# Patient Record
Sex: MALE | Race: WHITE | Employment: FULL TIME | URBAN - METROPOLITAN AREA
[De-identification: names, ages, dates, MRNs, and addresses within clinical notes are randomized per-mention and may not be internally consistent; named-entity substitution may affect disease eponyms.]

---

## 2017-03-01 ENCOUNTER — TELEPHONE (OUTPATIENT)
Dept: FAMILY MEDICINE | Facility: OTHER | Age: 62
End: 2017-03-01

## 2017-03-01 NOTE — TELEPHONE ENCOUNTER
Summary:    Patient is due/failing the following:   BP CHECK, PHQ9 and PHYSICAL    Action needed:   Patient needs office visit for PE and hypertension follow up. and Patient needs to do PHQ9.    Type of outreach:    Phone, left message for patient to call back.     Questions for provider review:    None                                                                                                                                    Sylvie Waddell       Chart routed to Care Team .        Panel Management Review      Patient has the following on his problem list:     Depression / Dysthymia review  PHQ-9 SCORE 9/25/2015 11/6/2015 4/29/2016   Total Score - - -   Total Score 13 13 17      Patient is due for:  PHQ9 and DAP    Hypertension   Last three blood pressure readings:  BP Readings from Last 3 Encounters:   07/28/16 126/77   04/29/16 (!) 148/96   12/07/15 (!) 160/100     Blood pressure: FAILED    HTN Guidelines:  Age 18-59 BP range:  Less than 140/90  Age 60-85 with Diabetes:  Less than 140/90  Age 60-85 without Diabetes:  less than 150/90      Composite cancer screening  Chart review shows that this patient is due/due soon for the following None

## 2017-03-01 NOTE — LETTER
[facility]  [id number] [place] [id number]  Phone: [phone]  March 8, 2017      Bryce Diaz  [address] [place] [id number]      Dear Bryce,    We care about your health and have reviewed your health plan including your medical conditions, medications, and lab results.  Based on this review, it is recommended that you follow up regarding the following health topic(s):  -Depression  -High Blood Pressure  -Wellness (Physical) Visit     We recommend you take the following action(s):  -schedule a FOLLOWUP APPOINTMENT.  -Complete and return the attached PHQ-9 Form.  If your total score is greater than 9, please schedule a followup appointment.  If you answer Yes to question 9, call your clinic between the hours of 8 to 5.  You may also call the Suicide Hotline at 6-312-949-FRQG (0525) any time.     Please call us at the [facility] - [phone] (or use [employer]) to address the above recommendations.     Thank you for trusting [facility] and we appreciate the opportunity to serve you.  We look forward to supporting your healthcare needs in the future.    Healthy Regards,    Your Health Care Team  [facility]

## 2017-07-19 ENCOUNTER — APPOINTMENT (OUTPATIENT)
Dept: GENERAL RADIOLOGY | Facility: CLINIC | Age: 62
End: 2017-07-19
Attending: FAMILY MEDICINE

## 2017-07-19 ENCOUNTER — HOSPITAL ENCOUNTER (EMERGENCY)
Facility: CLINIC | Age: 62
Discharge: HOME | End: 2017-07-19
Attending: FAMILY MEDICINE | Admitting: FAMILY MEDICINE

## 2017-07-19 VITALS
HEIGHT: 67 IN | SYSTOLIC BLOOD PRESSURE: 127 MMHG | TEMPERATURE: 98.8 F | RESPIRATION RATE: 20 BRPM | BODY MASS INDEX: 16.95 KG/M2 | HEART RATE: 82 BPM | WEIGHT: 108 LBS | OXYGEN SATURATION: 96 % | DIASTOLIC BLOOD PRESSURE: 67 MMHG

## 2017-07-19 DIAGNOSIS — I10 BENIGN ESSENTIAL HYPERTENSION: ICD-10-CM

## 2017-07-19 DIAGNOSIS — J18.9 COMMUNITY ACQUIRED PNEUMONIA: ICD-10-CM

## 2017-07-19 DIAGNOSIS — R07.1 PAINFUL RESPIRATION: ICD-10-CM

## 2017-07-19 DIAGNOSIS — R07.89 ATYPICAL CHEST PAIN: ICD-10-CM

## 2017-07-19 LAB
ALBUMIN SERPL-MCNC: 3.6 G/DL (ref 3.4–5)
ALP SERPL-CCNC: 83 U/L (ref 40–150)
ALT SERPL W P-5'-P-CCNC: 23 U/L (ref 0–70)
ANION GAP SERPL CALCULATED.3IONS-SCNC: 7 MMOL/L (ref 3–14)
AST SERPL W P-5'-P-CCNC: 17 U/L (ref 0–45)
BASOPHILS # BLD AUTO: 0 10E9/L (ref 0–0.2)
BASOPHILS NFR BLD AUTO: 0.2 %
BILIRUB DIRECT SERPL-MCNC: <0.1 MG/DL (ref 0–0.2)
BILIRUB SERPL-MCNC: 0.4 MG/DL (ref 0.2–1.3)
BUN SERPL-MCNC: 23 MG/DL (ref 7–30)
CALCIUM SERPL-MCNC: 8.3 MG/DL (ref 8.5–10.1)
CHLORIDE SERPL-SCNC: 108 MMOL/L (ref 94–109)
CO2 SERPL-SCNC: 26 MMOL/L (ref 20–32)
CREAT SERPL-MCNC: 1 MG/DL (ref 0.66–1.25)
CRP SERPL-MCNC: 6.4 MG/L (ref 0–8)
D DIMER PPP FEU-MCNC: 0.3 UG/ML FEU (ref 0–0.5)
DIFFERENTIAL METHOD BLD: ABNORMAL
EOSINOPHIL # BLD AUTO: 0.1 10E9/L (ref 0–0.7)
EOSINOPHIL NFR BLD AUTO: 1.6 %
ERYTHROCYTE [DISTWIDTH] IN BLOOD BY AUTOMATED COUNT: 14.2 % (ref 10–15)
GFR SERPL CREATININE-BSD FRML MDRD: 76 ML/MIN/1.7M2
GLUCOSE SERPL-MCNC: 90 MG/DL (ref 70–99)
HCT VFR BLD AUTO: 40 % (ref 40–53)
HGB BLD-MCNC: 13.1 G/DL (ref 13.3–17.7)
IMM GRANULOCYTES # BLD: 0 10E9/L (ref 0–0.4)
IMM GRANULOCYTES NFR BLD: 0.5 %
LYMPHOCYTES # BLD AUTO: 1.2 10E9/L (ref 0.8–5.3)
LYMPHOCYTES NFR BLD AUTO: 13.2 %
MCH RBC QN AUTO: 30.1 PG (ref 26.5–33)
MCHC RBC AUTO-ENTMCNC: 32.8 G/DL (ref 31.5–36.5)
MCV RBC AUTO: 92 FL (ref 78–100)
MONOCYTES # BLD AUTO: 0.8 10E9/L (ref 0–1.3)
MONOCYTES NFR BLD AUTO: 8.7 %
NEUTROPHILS # BLD AUTO: 6.7 10E9/L (ref 1.6–8.3)
NEUTROPHILS NFR BLD AUTO: 75.8 %
PLATELET # BLD AUTO: 222 10E9/L (ref 150–450)
POTASSIUM SERPL-SCNC: 4 MMOL/L (ref 3.4–5.3)
PROT SERPL-MCNC: 7.4 G/DL (ref 6.8–8.8)
RBC # BLD AUTO: 4.35 10E12/L (ref 4.4–5.9)
SODIUM SERPL-SCNC: 141 MMOL/L (ref 133–144)
TROPONIN I SERPL-MCNC: NORMAL UG/L (ref 0–0.04)
WBC # BLD AUTO: 8.8 10E9/L (ref 4–11)

## 2017-07-19 PROCEDURE — 99285 EMERGENCY DEPT VISIT HI MDM: CPT | Mod: 25 | Performed by: FAMILY MEDICINE

## 2017-07-19 PROCEDURE — 93010 ELECTROCARDIOGRAM REPORT: CPT | Mod: Z6 | Performed by: FAMILY MEDICINE

## 2017-07-19 PROCEDURE — 84484 ASSAY OF TROPONIN QUANT: CPT | Performed by: FAMILY MEDICINE

## 2017-07-19 PROCEDURE — 25000128 H RX IP 250 OP 636: Performed by: FAMILY MEDICINE

## 2017-07-19 PROCEDURE — 93005 ELECTROCARDIOGRAM TRACING: CPT | Performed by: FAMILY MEDICINE

## 2017-07-19 PROCEDURE — 85379 FIBRIN DEGRADATION QUANT: CPT | Performed by: FAMILY MEDICINE

## 2017-07-19 PROCEDURE — 80076 HEPATIC FUNCTION PANEL: CPT | Performed by: FAMILY MEDICINE

## 2017-07-19 PROCEDURE — 96365 THER/PROPH/DIAG IV INF INIT: CPT | Performed by: FAMILY MEDICINE

## 2017-07-19 PROCEDURE — 99285 EMERGENCY DEPT VISIT HI MDM: CPT | Mod: Z6 | Performed by: FAMILY MEDICINE

## 2017-07-19 PROCEDURE — 86140 C-REACTIVE PROTEIN: CPT | Performed by: FAMILY MEDICINE

## 2017-07-19 PROCEDURE — 80048 BASIC METABOLIC PNL TOTAL CA: CPT | Performed by: FAMILY MEDICINE

## 2017-07-19 PROCEDURE — 85025 COMPLETE CBC W/AUTO DIFF WBC: CPT | Performed by: FAMILY MEDICINE

## 2017-07-19 PROCEDURE — 25000132 ZZH RX MED GY IP 250 OP 250 PS 637: Performed by: FAMILY MEDICINE

## 2017-07-19 PROCEDURE — 71020 XR CHEST 2 VW: CPT | Mod: TC

## 2017-07-19 RX ORDER — LIDOCAINE 40 MG/G
CREAM TOPICAL
Status: DISCONTINUED | OUTPATIENT
Start: 2017-07-19 | End: 2017-07-19 | Stop reason: HOSPADM

## 2017-07-19 RX ORDER — ACETAMINOPHEN 325 MG/1
975 TABLET ORAL ONCE
Status: COMPLETED | OUTPATIENT
Start: 2017-07-19 | End: 2017-07-19

## 2017-07-19 RX ORDER — CEFTRIAXONE 1 G/1
1 INJECTION, POWDER, FOR SOLUTION INTRAMUSCULAR; INTRAVENOUS ONCE
Status: COMPLETED | OUTPATIENT
Start: 2017-07-19 | End: 2017-07-19

## 2017-07-19 RX ORDER — CEFUROXIME AXETIL 500 MG/1
500 TABLET ORAL 2 TIMES DAILY
Qty: 20 TABLET | Refills: 0 | Status: SHIPPED | OUTPATIENT
Start: 2017-07-19 | End: 2017-08-24

## 2017-07-19 RX ADMIN — CEFTRIAXONE SODIUM 1 G: 1 INJECTION, POWDER, FOR SOLUTION INTRAMUSCULAR; INTRAVENOUS at 20:13

## 2017-07-19 RX ADMIN — ACETAMINOPHEN 975 MG: 325 TABLET ORAL at 19:30

## 2017-07-19 ASSESSMENT — ENCOUNTER SYMPTOMS
WHEEZING: 0
LIGHT-HEADEDNESS: 1
WEAKNESS: 1
COUGH: 0
DIZZINESS: 0
NERVOUS/ANXIOUS: 0
HEADACHES: 1
FEVER: 1
FATIGUE: 1
DIAPHORESIS: 1
EYE REDNESS: 0
DYSURIA: 0
CHILLS: 1
CONFUSION: 0
NAUSEA: 0
DIARRHEA: 0
POLYDIPSIA: 0
PALPITATIONS: 0
SHORTNESS OF BREATH: 0
EYE PAIN: 0
SORE THROAT: 0
VOMITING: 0
BACK PAIN: 1
ABDOMINAL PAIN: 0

## 2017-07-19 NOTE — ED NOTES
Pt put on cont cardiac monitor, oximeter and BP cuff on arrival. Pt told a nitro of his brothers before arrival. Took excedrin this am. Equals 500mg of asa

## 2017-07-19 NOTE — ED AVS SNAPSHOT
[facility]    [address] [place] [address]    Phone:  [phone]    Fax:  [phone]                                       Bryce Diaz   MRN: [id number]    Department:  [facility]   Date of Visit:  7/19/2017           After Visit Summary Signature Page     I have received my discharge instructions, and my questions have been answered. I have discussed any challenges I see with this plan with the nurse or doctor.    ..........................................................................................................................................  Patient/Patient Representative Signature      ..........................................................................................................................................  Patient Representative Print Name and Relationship to Patient    ..................................................               ................................................  Date                                            Time    ..........................................................................................................................................  Reviewed by Signature/Title    ...................................................              ..............................................  Date                                                            Time

## 2017-07-19 NOTE — ED AVS SNAPSHOT
[facility] Emergency Department    [address] [place] [address]    Phone:  [phone]    Fax:  [phone]                                       Bryce Diaz   MRN: [id number]    Department:  [facility] Emergency Department   Date of Visit:  7/19/2017           Patient Information     Date Of Birth          1955        Your diagnoses for this visit were:     Community acquired pneumonia     Painful respiration     Atypical chest pain     Benign essential hypertension        You were seen by Jenna, Omer DAN MD.      Follow-up Information     Follow up with Jesus Powell MD In 1 week.    Specialty:  Family Practice    Contact information:    [facility]  [address] [place] [id number] [phone]          Follow up with [facility] Emergency Department.    Specialty:  EMERGENCY MEDICINE    Why:  If symptoms worsen    Contact information:    [address] [place] [id number] [phone]    Additional information:    From [address]: Exit at [address] on south side of [place]. Turn right on [address]. Turn left at stoplight on [address]. [facility] will be in view two blocks ahead        Discharge Instructions         Pneumonia (Adult)  Pneumonia is an infection deep within the lungs. It is in the small air sacs (alveoli). Pneumonia may be caused by a virus or bacteria. Pneumonia caused by bacteria is usually treated with an antibiotic. Severe cases may need to be treated in the hospital. Milder cases can be treated at home. Symptoms usually start to get better during the first 2 days of treatment.    Home care  Follow these guidelines when caring for yourself at home:    Rest at home for the first 2 to 3 days, or until you feel stronger. Don t let yourself get overly tired when you go back to your activities.    Stay away from cigarette smoke - yours or other people s.    You may use acetaminophen or  ibuprofen to control fever or pain, unless another medicine was prescribed. If you have chronic liver or kidney disease, talk with your healthcare provider before using these medicines. Also talk with your provider if you ve had a stomach ulcer or gastrointestinal bleeding. Don t give aspirin to anyone younger than 18 years of age who is ill with a fever. It may cause severe liver damage.    Your appetite may be poor, so a light diet is fine.    Drink 6 to 8 glasses of fluids every day to make sure you are getting enough fluids. Beverages can include water, sport drinks, sodas without caffeine, juices, tea, or soup. Fluids will help loosen secretions in the lung. This will make it easier for you to cough up the phlegm (sputum). If you also have heart or kidney disease, check with your healthcare provider before you drink extra fluids.    Take antibiotic medicine prescribed until it is all gone, even if you are feeling better after a few days.  Follow-up care  Follow up with your healthcare provider in the next 2 to 3 days, or as advised. This is to be sure the medicine is helping you get better.  If you are 65 or older, you should get a pneumococcal vaccine and a yearly flu (influenza) shot. You should also get these vaccines if you have chronic lung disease like asthma, emphysema, or COPD. Recently, a second type of pneumonia vaccine has become available for everyone over 65 years old. This is in addition to the previous vaccine. Ask your provider about this.  When to seek medical advice  Call your healthcare provider right away if any of these occur:    You don t get better within the first 48 hours of treatment    Shortness of breath gets worse    Rapid breathing (more than 25 breaths per minute)    Coughing up blood    Chest pain gets worse with breathing    Fever of 100.4 F (38 C) or higher that doesn t get better with fever medicine    Weakness, dizziness, or fainting that gets worse    Thirst or dry mouth  that gets worse    Sinus pain, headache, or a stiff neck    Chest pain not caused by coughing  Date Last Reviewed: 1/1/2017 2000-2017 [employer]. [address] [place] [address], [place] [address]. All rights reserved. This information is not intended as a substitute for professional medical care. Always follow your healthcare professional's instructions.          Discharge References/Attachments     PLEURISY (ENGLISH)    PNEUMONIA, WHEN YOU HAVE  (ENGLISH)      24 Hour Appointment Hotline       To make an appointment at any [facility], call [phone] ([phone]). If you don't have a family doctor or clinic, we will help you find one. [place] clinics are conveniently located to serve the needs of you and your family.             Review of your medicines      START taking        Dose / Directions Last dose taken    cefuroxime 500 MG tablet   Commonly known as:  CEFTIN   Dose:  500 mg   Quantity:  20 tablet        Take 1 tablet (500 mg) by mouth 2 times daily   Refills:  0          Our records show that you are taking the medicines listed below. If these are incorrect, please call your family doctor or clinic.        Dose / Directions Last dose taken    amphetamine-dextroamphetamine 10 MG per tablet   Commonly known as:  ADDERALL   Dose:  10-20 mg   Quantity:  180 tablet        Take 1-2 tablets (10-20 mg) by mouth 3 times daily up to 6 tablets per day   Refills:  0        carvedilol 12.5 MG tablet   Commonly known as:  COREG   Dose:  12.5 mg   Quantity:  60 tablet        Take 1 tablet (12.5 mg) by mouth 2 times daily (with meals)   Refills:  2        gabapentin 300 MG capsule   Commonly known as:  NEURONTIN   Dose:  300 mg   Quantity:  270 capsule        Take 1 capsule (300 mg) by mouth 3 times daily   Refills:  1        oxyCODONE 5 MG IR tablet   Commonly known as:  ROXICODONE   Dose:  5 mg   Quantity:  20 tablet        Take 1 tablet (5 mg) by mouth every 4 hours as needed for pain (Reserve  for bedtime use)   Refills:  0        sertraline 50 MG tablet   Commonly known as:  ZOLOFT   Dose:  50 mg   Quantity:  90 tablet        Take 1 tablet (50 mg) by mouth daily   Refills:  1        traZODone 150 MG tablet   Commonly known as:  DESYREL   Dose:  150 mg   Quantity:  90 tablet        Take 1 tablet (150 mg) by mouth At Bedtime   Refills:  1        TYLENOL PO   Dose:  650 mg        Take 650 mg by mouth every 4 hours as needed for mild pain or fever   Refills:  0                Prescriptions were sent or printed at these locations (1 Prescription)                   [facility] [place], [place] - [address]    [address] , [facility] [address]    Telephone:  [phone]   Fax:  [phone]   Hours:                  Printed at Department/Unit printer (1 of 1)         cefuroxime (CEFTIN) 500 MG tablet                Procedures and tests performed during your visit     Basic metabolic panel    CBC with platelets differential    CRP inflammation    D dimer quantitative    EKG 12-lead, tracing only    Hepatic panel    Peripheral IV: Standard    Troponin I    XR Chest 2 Views      Orders Needing Specimen Collection     None      Pending Results     Date and Time Order Name Status Description    7/19/2017 1839 XR Chest 2 Views Preliminary             Pending Culture Results     No orders found from 7/17/2017 to 7/20/2017.            Pending Results Instructions     If you had any lab results that were not finalized at the time of your Discharge, you can call the ED Lab Result RN at [phone]. You will be contacted by this team for any positive Lab results or changes in treatment. The nurses are available 7 days a week from 10A to 6:30P.  You can leave a message 24 hours per day and they will return your call.        Thank you for choosing [facility]       Thank you for choosing [facility] for your care. Our goal is always to provide you with excellent care. Hearing back from our patients is one  way we can continue to improve our services. Please take a few minutes to complete the written survey that you may receive in the mail after you visit with us. Thank you!        [employer] Information     [employer] gives you secure access to your electronic health record. If you see a primary care provider, you can also send messages to your care team and make appointments. If you have questions, please call your primary care clinic.  If you do not have a primary care provider, please call [phone] and they will assist you.        Care EveryWhere ID     This is your Care EveryWhere ID. This could be used by other organizations to access your [place] medical records  [id number]        Equal Access to Services     GLDAYS ESQUVIEL : Qian Rai, elkin soares, sherif pereyra, alexandre vance . So [facility] [phone].    ATENCIÓN: Si habla español, tiene a arevalo disposición servicios gratuitos de asistencia lingüística. Llame al [phone].    We comply with applicable federal civil rights laws and [place] laws. We do not discriminate on the basis of race, color, national origin, age, disability sex, sexual orientation or gender identity.            After Visit Summary       This is your record. Keep this with you and show to your community pharmacist(s) and doctor(s) at your next visit.

## 2017-07-19 NOTE — ED NOTES
"He started having chest tightness and his head felt \"on fire\" since 1430.  When he woke up the stabbing pain in his shoulder pain was worse and his BP was 200/127 and his brother gave him a nitro and it seemed to help his BP but not feeling himself yet.  "

## 2017-07-20 NOTE — DISCHARGE INSTRUCTIONS

## 2017-07-20 NOTE — ED NOTES
Brought patient some crackers- patient wanted to know if he could have a fish sandwich and a shake. Per  That would be ok- he doesn't have any dietary restrictions.

## 2017-07-20 NOTE — ED PROVIDER NOTES
History     Chief Complaint   Patient presents with     Chest Pain     HPI  Bryce Diaz is a 61 year old male who presents to the emergency department with chest pain back pain and feeling ill. He came home from an uneventful day at work earlier today. He took a nap and on awakening he was very red in the face flush warm and febrile. He notices that when he takes a deep breath he has a pain underneath his right shoulder blade. He also began noticing that he was having some pains all across his chest. The symptoms he presented to make sure he wasn't having a heart attack. Risk factors include hypertension. He has no history of pleurisy. He does have a history of pneumonia and recalls not having any severe symptoms with it. He has had no cold symptoms of sore throat cough or congestion. He feels very chilled here in the emergency department and was very hot red and sweaty at home.    Patient Active Problem List   Diagnosis     Esophageal reflux     Attention deficit hyperactivity disorder (ADHD)     Myalgia and myositis     CARDIOVASCULAR SCREENING; LDL GOAL LESS THAN 130     Hypertension goal BP (blood pressure) < 140/90     Intervertebral lumbar disc disorder with myelopathy, lumbar region     Low back pain radiating to both legs     Advanced directives, counseling/discussion     Sciatic nerve pain     Mild major depression (H)     BPH (benign prostatic hyperplasia)     Lumbar stenosis     Past Medical History:   Diagnosis Date     Backache, unspecified     Hx of back pain     Depressive disorder, not elsewhere classified 1979    committed for suicidal ideation and depression     Depressive disorder, not elsewhere classified 12/18/01    E/P Attention Deficit Disorder - See Sum. From [facility]     Hernia of unspecified site of abdominal cavity without mention of obstruction or gangrene 08/07/1998    Umbilical hernia repair with mesh plug     Current Facility-Administered Medications   Medication      lidocaine 1 % 1 mL     lidocaine (LMX4) kit     sodium chloride (PF) 0.9% PF flush 3 mL     sodium chloride (PF) 0.9% PF flush 3 mL     cefTRIAXone (ROCEPHIN) 1 g vial to attach to  mL bag for ADULTS or NS 50 mL bag for PEDS     Current Outpatient Prescriptions   Medication     cefuroxime (CEFTIN) 500 MG tablet     Acetaminophen (TYLENOL PO)     oxyCODONE (ROXICODONE) 5 MG immediate release tablet     traZODone (DESYREL) 150 MG tablet     sertraline (ZOLOFT) 50 MG tablet     carvedilol (COREG) 12.5 MG tablet     amphetamine-dextroamphetamine (ADDERALL) 10 MG tablet     gabapentin (NEURONTIN) 300 MG capsule     No Known Allergies  Past Surgical History:   Procedure Laterality Date     C APPENDECTOMY      5 years of age     C NONSPECIFIC PROCEDURE  1983    neck fusion     COLONOSCOPY N/A 9/23/2015    Procedure: COLONOSCOPY;  Surgeon: Juan Francisco Cornejo MD;  Location: [facility]     HC RECONSTRUCT TENDON GOLDEN EA, W LOCAL TISSUES  08/07/1998    A-1 pulley release, right fourth finger     HC UGI ENDOSCOPY DIAG W OR W/O BRUSH/WASH  2002    bx. pending  - use proton pump inhibitor indefinitely     HEMILAMINECTOMY, DISCECTOMY LUMBAR TWO LEVELS, COMBINED Right 10/21/2015    Procedure: COMBINED HEMILAMINECTOMY, DISCECTOMY LUMBAR TWO LEVELS;  Surgeon: Jesus Ornelas MD;  Location: [facility]     INJECT EPIDURAL LUMBAR  11/14/13,2/10/14    [facility]     INJECT EPIDURAL LUMBAR  5/13/14,8/5/14    [facility]     INJECT EPIDURAL LUMBAR  12/15/14    [facility]     Social History     Social History     Marital status:      Spouse name: Keyla     Number of children: 4     Years of education: 14     Occupational History     Coun[employer]     Social History Main Topics     Smoking status: Never Smoker     Smokeless tobacco: Never Used      Comment: no smoking in the home     Alcohol use Yes      Comment: rare     Drug use: No     Sexual activity: No  "     Comment: Spouse \"tubes tied\"     Other Topics Concern      Service No     Blood Transfusions Yes     Blood transfusion with snake bite in approx 1978     Caffeine Concern Yes     pop: 1 gal /day     Occupational Exposure No     Hobby Hazards No     Sleep Concern Yes     Hx sleep study     Stress Concern Yes     Weight Concern Yes     desire wt loss     Special Diet No     Back Care Yes     Hx back and neck surgery     Exercise Yes     Basketball, hunting and biking   3/wk     Bike Helmet Yes     Seat Belt Yes     Parent/Sibling W/ Cabg, Mi Or Angioplasty Before 65f 55m? No     Social History Narrative    Liives with spouse and 3 children.         I have reviewed the Medications, Allergies, Past Medical and Surgical History, and Social History in the Epic system.               Review of Systems   Constitutional: Positive for chills, diaphoresis, fatigue and fever.   HENT: Negative for congestion and sore throat.    Eyes: Negative for pain and redness.   Respiratory: Negative for cough, shortness of breath and wheezing.    Cardiovascular: Positive for chest pain. Negative for palpitations.   Gastrointestinal: Negative for abdominal pain, diarrhea, nausea and vomiting.   Endocrine: Negative for polydipsia and polyuria.   Genitourinary: Negative for dysuria.   Musculoskeletal: Positive for back pain.        Mostly underneath his right shoulder blade in the back.   Skin: Negative for pallor and rash.   Allergic/Immunologic: Negative for immunocompromised state.   Neurological: Positive for weakness, light-headedness and headaches. Negative for dizziness.        The patient usually has daily headaches if he does not take any caffeine. They were usually respond to caffeine-Excedrin Migraine.   Psychiatric/Behavioral: Negative for confusion. The patient is not nervous/anxious.    All other systems reviewed and are negative.      Physical Exam   BP: (!) 163/105  Pulse: 82  Heart Rate: 82  Temp: 100  F (37.8 " " C)  Resp: 18  Height: 170.2 cm (5' 7\")  Weight: 49 kg (108 lb)  SpO2: 99 %  Physical Exam   Constitutional: He is oriented to person, place, and time.   Alert pleasant smiling joking 61-year-old male. He has a low-grade fever and is hypertensive. He has quit taking his blood pressure medications.BP (!) 135/99  Pulse 82  Temp 100  F (37.8  C) (Oral)  Resp (!) 32  Ht 1.702 m (5' 7\")  Wt 49 kg (108 lb)  SpO2 96%  BMI 16.92 kg/m2     HENT:   Head: Normocephalic.   Right Ear: External ear normal.   Left Ear: External ear normal.   Nose: Nose normal.   Mouth/Throat: Oropharynx is clear and moist.   Eyes: Conjunctivae are normal.   Neck: Normal range of motion. Neck supple.   Cardiovascular: Normal rate, regular rhythm and normal heart sounds.    Pulmonary/Chest: Effort normal and breath sounds normal.   Abdominal: Soft. There is no tenderness. There is no rebound and no guarding.   Musculoskeletal:        Thoracic back: He exhibits pain. He exhibits no tenderness.        Back:    Neurological: He is alert and oriented to person, place, and time.   Skin: Skin is warm and dry.   Psychiatric: He has a normal mood and affect. His behavior is normal.   Nursing note and vitals reviewed.      ED Course     ED Course     Results for orders placed or performed during the hospital encounter of 07/19/17 (from the past 48 hour(s))   CBC with platelets differential   Result Value Ref Range    WBC 8.8 4.0 - 11.0 10e9/L    RBC Count 4.35 (L) 4.4 - 5.9 10e12/L    Hemoglobin 13.1 (L) 13.3 - 17.7 g/dL    Hematocrit 40.0 40.0 - 53.0 %    MCV 92 78 - 100 fl    MCH 30.1 26.5 - 33.0 pg    MCHC 32.8 31.5 - 36.5 g/dL    RDW 14.2 10.0 - 15.0 %    Platelet Count 222 150 - 450 10e9/L    Diff Method Automated Method     % Neutrophils 75.8 %    % Lymphocytes 13.2 %    % Monocytes 8.7 %    % Eosinophils 1.6 %    % Basophils 0.2 %    % Immature Granulocytes 0.5 %    Absolute Neutrophil 6.7 1.6 - 8.3 10e9/L    Absolute Lymphocytes 1.2 0.8 - 5.3 " 10e9/L    Absolute Monocytes 0.8 0.0 - 1.3 10e9/L    Absolute Eosinophils 0.1 0.0 - 0.7 10e9/L    Absolute Basophils 0.0 0.0 - 0.2 10e9/L    Abs Immature Granulocytes 0.0 0 - 0.4 10e9/L   Basic metabolic panel   Result Value Ref Range    Sodium 141 133 - 144 mmol/L    Potassium 4.0 3.4 - 5.3 mmol/L    Chloride 108 94 - 109 mmol/L    Carbon Dioxide 26 20 - 32 mmol/L    Anion Gap 7 3 - 14 mmol/L    Glucose 90 70 - 99 mg/dL    Urea Nitrogen 23 7 - 30 mg/dL    Creatinine 1.00 0.66 - 1.25 mg/dL    GFR Estimate 76 >60 mL/min/1.7m2    GFR Estimate If Black >90   GFR Calc   >60 mL/min/1.7m2    Calcium 8.3 (L) 8.5 - 10.1 mg/dL   Hepatic panel   Result Value Ref Range    Bilirubin Direct <0.1 0.0 - 0.2 mg/dL    Bilirubin Total 0.4 0.2 - 1.3 mg/dL    Albumin 3.6 3.4 - 5.0 g/dL    Protein Total 7.4 6.8 - 8.8 g/dL    Alkaline Phosphatase 83 40 - 150 U/L    ALT 23 0 - 70 U/L    AST 17 0 - 45 U/L   Troponin I   Result Value Ref Range    Troponin I ES  0.000 - 0.045 ug/L     <0.015  The 99th percentile for upper reference range is 0.045 ug/L.  Troponin values in   the range of 0.045 - 0.120 ug/L may be associated with risks of adverse   clinical events.     D dimer quantitative   Result Value Ref Range    D Dimer 0.3 0.0 - 0.50 ug/ml FEU   CRP inflammation   Result Value Ref Range    CRP Inflammation 6.4 0.0 - 8.0 mg/L   XR Chest 2 Views    Narrative    CHEST TWO VIEWS   7/19/2017 7:12 PM     HISTORY: Short of air.    COMPARISON: None.      Impression    IMPRESSION: Cardiac silhouette within normal limits. Streaky right  lower lobe opacity represents atelectasis versus pneumonia. No pleural  effusion. No pneumothorax identified. Nodule in the left upper lung  probably represents a calcified granuloma and is unchanged. Anterior  cervical fusion hardware present.       Procedures             Critical Care time:  none          EKG Interpretation:      Interpreted by Omer BECKER Jenna  Time reviewed:1800   Symptoms at  time of EKG: Chest and back pain   Rhythm: Normal sinus   Rate: Normal  Axis: Normal  Ectopy: None  Conduction: Normal  ST Segments/ T Waves: No ST-T wave changes and No acute ischemic changes  Q Waves: None  Comparison to prior: No old EKG available    Clinical Impression: normal EKG--normal sinus rhythm at a rate of 83 with no acute ST-T changes. No evidence of ischemia or previous infarct.                    Assessments & Plan (with Medical Decision Making)   [facility]--patient is a 61-year-old who presents with sudden onset of fever or chills. Pleuritic  Back pain and a vague chest pain. He has no associated jaw pain and left arm pain and nausea lightheadedness dizziness weakness or sweating. He has no cardiac history. Only risk factor is hypertension. His EKG and troponin are normal. He was then found to have a suspicious infiltrate in the right lower lobe. This would certainly coincide with his fever and pleuritic back pain to this area. This was treated with Rocephin 1 g IV and we will continue with Ceftin 500 mg b.i.d. 10 days. He has no elevated white count but I suspect this is very early. I recommend he get extra rest drink lots of fluids. We need to reevaluate him if he has no symptoms or if he is getting worse. Patient is comfortable with this evaluation treatment discharge and follow-up plan. All his questions were answered to he and family satisfaction and the patient discharged in stable condition. Patient was offered pain control for his pleurisy but declined. He will take Excedrin as needed.  I have reviewed the nursing notes.    I have reviewed the findings, diagnosis, plan and need for follow up with the patient.       New Prescriptions    CEFUROXIME (CEFTIN) 500 MG TABLET    Take 1 tablet (500 mg) by mouth 2 times daily       Final diagnoses:   Community acquired pneumonia   Painful respiration   Atypical chest pain   Benign essential hypertension       7/19/2017   [facility]  DEPARTMENT     Jenna, Omer DAN MD  07/19/17 2014

## 2017-08-18 NOTE — PROGRESS NOTES
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 He has tried massage, ice stretching, arch and heel supports without resolution of symptoms. He would like to have an injection to try to help with this today.      Problem list and histories reviewed & adjusted, as indicated.  Additional history: as documented    BP Readings from Last 3 Encounters:   08/24/17 132/80   07/19/17 127/67   07/28/16 126/77    Wt Readings from Last 3 Encounters:   08/24/17 168 lb 11.2 oz (76.5 kg)   07/19/17 108 lb (49 kg)   04/29/16 162 lb (73.5 kg)                      ROS:  Constitutional, HEENT, cardiovascular, pulmonary, gi and gu systems are negative, except as otherwise noted.      OBJECTIVE:   /80  Pulse 72  Temp 98.1  F (36.7  C) (Temporal)  Resp 16  Ht 5' 7\" (1.702 m)  Wt 168 lb 11.2 oz (76.5 kg)  BMI 26.42 kg/m2  Body mass index is 26.42 kg/(m^2).  GENERAL: healthy, alert and no distress  RESP: lungs clear to auscultation - no rales, rhonchi or wheezes  CV: regular rates and rhythm, peripheral pulses strong and no peripheral edema  MS: there is point tenderness to the plantar fascia in front of the " heel.   SKIN: no suspicious lesions or rashes    Diagnostic Test Results:  Xray:read pending    ASSESSMENT/PLAN:       ICD-10-CM    1. Pain of left heel M79.672 XR Foot Left G/E 3 Views     methylPREDNISolone acetate (DEPO-MEDROL) 40 MG/ML injection       I discussed conservative treatment as well as risks and benefits of injection including risk of infection or risk of damage/atrophy to the area. Patient voices understanding and gives written consent for injection. The area was cleaned with alcohol and prepped with betadine and a 25 g needle was used to inject 1 ml methylprednisolone with 1 ml 1% lidocaine perpendicularly into the area of pain. Patient tolerated the procedure well and aftercare instructions were given and patient was told to follow up as needed.     See Patient Instructions    Qian Granado PA-C  [facility]

## 2017-08-24 ENCOUNTER — RADIANT APPOINTMENT (OUTPATIENT)
Dept: GENERAL RADIOLOGY | Facility: OTHER | Age: 62
End: 2017-08-24
Attending: PHYSICIAN ASSISTANT

## 2017-08-24 ENCOUNTER — OFFICE VISIT (OUTPATIENT)
Dept: FAMILY MEDICINE | Facility: OTHER | Age: 62
End: 2017-08-24

## 2017-08-24 VITALS
SYSTOLIC BLOOD PRESSURE: 132 MMHG | HEIGHT: 67 IN | BODY MASS INDEX: 26.48 KG/M2 | TEMPERATURE: 98.1 F | WEIGHT: 168.7 LBS | HEART RATE: 72 BPM | DIASTOLIC BLOOD PRESSURE: 80 MMHG | RESPIRATION RATE: 16 BRPM

## 2017-08-24 DIAGNOSIS — M79.672 PAIN OF LEFT HEEL: Primary | ICD-10-CM

## 2017-08-24 DIAGNOSIS — M79.672 PAIN OF LEFT HEEL: ICD-10-CM

## 2017-08-24 PROCEDURE — 99213 OFFICE O/P EST LOW 20 MIN: CPT | Mod: 25 | Performed by: PHYSICIAN ASSISTANT

## 2017-08-24 PROCEDURE — 73630 X-RAY EXAM OF FOOT: CPT | Mod: LT

## 2017-08-24 PROCEDURE — 96372 THER/PROPH/DIAG INJ SC/IM: CPT | Performed by: PHYSICIAN ASSISTANT

## 2017-08-24 RX ORDER — METHYLPREDNISOLONE ACETATE 40 MG/ML
40 INJECTION, SUSPENSION INTRA-ARTICULAR; INTRALESIONAL; INTRAMUSCULAR; SOFT TISSUE ONCE
Qty: 1 ML | Refills: 0
Start: 2017-08-24 | End: 2017-08-24

## 2017-08-24 ASSESSMENT — PAIN SCALES - GENERAL: PAINLEVEL: EXTREME PAIN (9)

## 2017-08-24 NOTE — PATIENT INSTRUCTIONS
Understanding Heel Pain    Your heel is the back part of your foot. A band of tissue called the plantar fascia connects the heel bone to the bones in the ball of your foot. Nerves run from the heel up the inside of your ankle and into your leg. When you feel pain in the bottom of your heel, the plantar fascia may be inflamed. Overuse, Achilles tightness, or excess body weight can cause the tissue to tear or pull away from the bone. Sometimes the inflamed plantar fascia also irritates a nerve, causing more pain.  What causes heel pain?  Wearing shoes with poor cushioning can irritate the tissue in your heel (plantar fascia). Being overweight or standing for long periods can also irritate the tissue. Running, walking, tennis, and other sports that put stress on the heels can cause tiny tears in the tissue. If your lower leg muscles are tight, this is more likely to occur. A tight Achilles tendon will also contribute to heel pain.  Symptoms  You may feel pain on the bottom or on the inside edge of your heel. The pain may be sharp when you get out of bed or when you stand up after sitting for a while. You may feel a dull ache in your heel after you ve been standing for a long time on a hard surface. Running can also cause a dull ache.  Preventing future problems  To prevent future heel pain, wear shoes with well-cushioned heels. And do exercises prescribed by your healthcare provider to stretch the plantar fascia and the muscles in the lower leg.   Date Last Reviewed: 9/10/2015    [id number] [employer]. [address] [place] [address], [place] [address]. All rights reserved. This information is not intended as a substitute for professional medical care. Always follow your healthcare professional's instructions.

## 2017-08-24 NOTE — NURSING NOTE
"Chief Complaint   Patient presents with     Foot Injury     Panel Management     Hep C, dap, phq, anthony       Initial /90 (BP Location: Left arm, Patient Position: Chair, Cuff Size: Adult Regular)  Pulse 72  Temp 98.1  F (36.7  C) (Temporal)  Resp 16  Ht 5' 7\" (1.702 m)  Wt 168 lb 11.2 oz (76.5 kg)  BMI 26.42 kg/m2 Estimated body mass index is 26.42 kg/(m^2) as calculated from the following:    Height as of this encounter: 5' 7\" (1.702 m).    Weight as of this encounter: 168 lb 11.2 oz (76.5 kg).  Medication Reconciliation: complete  Zoila Jolley, ONEIL    "

## 2017-08-24 NOTE — LETTER
My Depression Action Plan  Name: Bryce Diaz   Date of Birth 1955  Date: 8/18/2017    My doctor: Jesus Powell   My clinic: [address] [address] [phone]          GREEN    ZONE   Good Control    What it looks like:     Things are going generally well. You have normal up s and down s. You may even feel depressed from time to time, but bad moods usually last less than a day.   What you need to do:  1. Continue to care for yourself (see self care plan)  2. Check your depression survival kit and update it as needed  3. Follow your physician s recommendations including any medication.  4. Do not stop taking medication unless you consult with your physician first.           YELLOW         ZONE Getting Worse    What it looks like:     Depression is starting to interfere with your life.     It may be hard to get out of bed; you may be starting to isolate yourself from others.    Symptoms of depression are starting to last most all day and this has happened for several days.     You may have suicidal thoughts but they are not constant.   What you need to do:     1. Call your care team, your response to treatment will improve if you keep your care team informed of your progress. Yellow periods are signs an adjustment may need to be made.     2. Continue your self-care, even if you have to fake it!    3. Talk to someone in your support network    4. Open up your depression survival kit           RED    ZONE Medical Alert - Get Help    What it looks like:     Depression is seriously interfering with your life.     You may experience these or other symptoms: You can t get out of bed most days, can t work or engage in other necessary activities, you have trouble taking care of basic hygiene, or basic responsibilities, thoughts of suicide or death that will not go away, self-injurious behavior.     What you need to do:  1. Call your care team and  request a same-day appointment. If they are not available (weekends or after hours) call your local crisis line, emergency room or 911.      Electronically signed by: Zoila Jolley, August 18, 2017    Depression Self Care Plan / Survival Kit    Self-Care for Depression  Here s the deal. Your body and mind are really not as separate as most people think.  What you do and think affects how you feel and how you feel influences what you do and think. This means if you do things that people who feel good do, it will help you feel better.  Sometimes this is all it takes.  There is also a place for medication and therapy depending on how severe your depression is, so be sure to consult with your medical provider and/ or Behavioral Health Consultant if your symptoms are worsening or not improving.     In order to better manage my stress, I will:    Exercise  Get some form of exercise, every day. This will help reduce pain and release endorphins, the  feel good  chemicals in your brain. This is almost as good as taking antidepressants!  This is not the same as joining a gym and then never going! (they count on that by the way ) It can be as simple as just going for a walk or doing some gardening, anything that will get you moving.      Hygiene   Maintain good hygiene (Get out of bed in the morning, Make your bed, Brush your teeth, Take a shower, and Get dressed like you were going to work, even if you are unemployed).  If your clothes don't fit try to get ones that do.    Diet  I will strive to eat foods that are good for me, drink plenty of water, and avoid excessive sugar, caffeine, alcohol, and other mood-altering substances.  Some foods that are helpful in depression are: complex carbohydrates, B vitamins, flaxseed, fish or fish oil, fresh fruits and vegetables.    Psychotherapy  I agree to participate in Individual Therapy (if recommended).    Medication  If prescribed medications, I agree to take them.   Missing doses can result in serious side effects.  I understand that drinking alcohol, or other illicit drug use, may cause potential side effects.  I will not stop my medication abruptly without first discussing it with my provider.    Staying Connected With Others  I will stay in touch with my friends, family members, and my primary care provider/team.    Use your imagination  Be creative.  We all have a creative side; it doesn t matter if it s oil painting, sand castles, or mud pies! This will also kick up the endorphins.    Witness Beauty  (AKA stop and smell the roses) Take a look outside, even in mid-winter. Notice colors, textures. Watch the squirrels and birds.     Service to others  Be of service to others.  There is always someone else in need.  By helping others we can  get out of ourselves  and remember the really important things.  This also provides opportunities for practicing all the other parts of the program.    Humor  Laugh and be silly!  Adjust your TV habits for less news and crime-drama and more comedy.    Control your stress  Try breathing deep, massage therapy, biofeedback, and meditation. Find time to relax each day.     My support system    Clinic Contact:  Phone number:    Contact 1:  Phone number:    Contact 2:  Phone number:    Quaker/:  Phone number:    Therapist:  Phone number:    Local crisis center:    Phone number:    Other community support:  Phone number:

## 2017-09-15 ENCOUNTER — ALLIED HEALTH/NURSE VISIT (OUTPATIENT)
Dept: FAMILY MEDICINE | Facility: OTHER | Age: 62
End: 2017-09-15

## 2017-09-15 VITALS — DIASTOLIC BLOOD PRESSURE: 94 MMHG | SYSTOLIC BLOOD PRESSURE: 142 MMHG | HEART RATE: 64 BPM

## 2017-09-15 DIAGNOSIS — I10 HYPERTENSION GOAL BP (BLOOD PRESSURE) < 140/90: ICD-10-CM

## 2017-09-15 DIAGNOSIS — Z01.30 BP CHECK: Primary | ICD-10-CM

## 2017-09-15 PROCEDURE — 99207 ZZC NO CHARGE NURSE ONLY: CPT

## 2017-09-15 RX ORDER — CARVEDILOL 6.25 MG/1
6.25 TABLET ORAL 2 TIMES DAILY WITH MEALS
Qty: 180 TABLET | Refills: 1 | Status: SHIPPED | OUTPATIENT
Start: 2017-09-15 | End: 2018-11-20

## 2017-09-15 NOTE — PROGRESS NOTES
SUBJECTIVE:                                                    Bryce ROGERIO Diaz is a 62 year old male who presents to clinic today for the following health issues:      HPI

## 2017-09-15 NOTE — NURSING NOTE
"Chief Complaint   Patient presents with     Referral       Initial /80 (Cuff Size: Adult Regular)  Pulse 58  Temp 97.6  F (36.4  C) (Temporal)  Resp 16  Ht 5' 7\" (1.702 m)  Wt 204 lb 12.8 oz (92.9 kg)  BMI 32.08 kg/m2 Estimated body mass index is 32.08 kg/(m^2) as calculated from the following:    Height as of this encounter: 5' 7\" (1.702 m).    Weight as of this encounter: 204 lb 12.8 oz (92.9 kg).  Medication Reconciliation: complete   Paola Mondragon CMA (AAMA)    "

## 2017-09-15 NOTE — NURSING NOTE
Huddled with DJ he wrote Rx for patient for low dose coreg 6.25 mg and I informed pt to follow up in one month with provider.  I walked Rx to pharmacy.  Zoila Jolley, CMA

## 2017-09-15 NOTE — Clinical Note
Dr. Powell please send over Rx to [facility]. Zoila Jolley, CMA

## 2017-09-15 NOTE — MR AVS SNAPSHOT
After Visit Summary   9/15/2017    Bryce Diaz    MRN: [id number]           Patient Information     Date Of Birth          1955        Visit Information        Provider Department      9/15/2017 3:00 PM [facility] [place] NURSE [facility]        Today's Diagnoses     BP check    -  1       Follow-ups after your visit        Who to contact     If you have questions or need follow up information about today's clinic visit or your schedule please contact [facility] directly at [phone].  Normal or non-critical lab and imaging results will be communicated to you by Brandkidshart, letter or phone within 4 business days after the clinic has received the results. If you do not hear from us within 7 days, please contact the clinic through [employer] or phone. If you have a critical or abnormal lab result, we will notify you by phone as soon as possible.  Submit refill requests through [employer]uspine or call your pharmacy and they will forward the refill request to us. Please allow 3 business days for your refill to be completed.          Additional Information About Your Visit        MyChart Information     [employer] gives you secure access to your electronic health record. If you see a primary care provider, you can also send messages to your care team and make appointments. If you have questions, please call your primary care clinic.  If you do not have a primary care provider, please call [phone] and they will assist you.        Care EveryWhere ID     This is your Care EveryWhere ID. This could be used by other organizations to access your [facility] medical records  [id number]        Your Vitals Were     Pulse                   64            Blood Pressure from Last 3 Encounters:   09/15/17 (!) 142/94   08/24/17 132/80   07/19/17 127/67    Weight from Last 3 Encounters:   08/24/17 168 lb 11.2 oz (76.5 kg)   07/19/17 108 lb (49 kg)   04/29/16 162 lb (73.5 kg)              Today, you  had the following     No orders found for display       Primary Care Provider Office Phone # Fax #    Jesus Powell [phone] [address] [place] [address]        Equal Access to Services     [facility] ALVIN : Hadii aad ku hadkalio Soomaali, waaxda luqadaha, qaybta kaalmada adeegyada, waxmatias garciain greggn meme francisco laTyraalthea lozano. So [facility] [phone].    ATENCIÓN: Si habla español, tiene a arevalo disposición servicios gratuitos de asistencia lingüística. Llame al [phone].    We comply with applicable federal civil rights laws and [place] laws. We do not discriminate on the basis of race, color, national origin, age, disability sex, sexual orientation or gender identity.            Thank you!     Thank you for choosing [facility]  for your care. Our goal is always to provide you with excellent care. Hearing back from our patients is one way we can continue to improve our services. Please take a few minutes to complete the written survey that you may receive in the mail after your visit with us. Thank you!             Your Updated Medication List - Protect others around you: Learn how to safely use, store and throw away your medicines at www.disposemymeds.org.      Notice  As of 9/15/2017  3:20 PM    You have not been prescribed any medications.

## 2017-09-15 NOTE — NURSING NOTE
Bryce Diaz is a 62 year old male who comes in today for a Blood Pressure check because of coming in after vacation to check on bp and his girlfriend has him come in to check it.    *Document pulse and BP  *Use new set of vitals button for multiple readings.  *Use extended vitals for orthostatic    Vitals as recorded, a regular cuff was used.    Patient is taking medication as prescribed  Patient is tolerating medications well.  Patient is not monitoring Blood Pressure at home.    Current complaints: none    Disposition: results routed to MD/AP

## 2017-11-07 ENCOUNTER — TELEPHONE (OUTPATIENT)
Dept: FAMILY MEDICINE | Facility: OTHER | Age: 62
End: 2017-11-07

## 2017-11-07 NOTE — TELEPHONE ENCOUNTER
Summary:    Patient is due/failing the following:   BP CHECK and PHYSICAL    Action needed:   Patient needs office visit for Physical .    Type of outreach:    Phone, left message for patient to call back.     Questions for provider review:    None                                                                                                                                    Sylvie Waddell       Chart routed to Care Team .    Panel Management Review      Patient has the following on his problem list:     Depression / Dysthymia review    Measure:  Needs PHQ-9 score of 4 or less during index window.  Administer PHQ-9 and if score is 5 or more, send encounter to provider for next steps.        PHQ-9 SCORE 9/25/2015 11/6/2015 4/29/2016   Total Score - - -   Total Score 13 13 17       If PHQ-9 recheck is 5 or more, route to provider for next steps.    Patient is due for:  PHQ9    Hypertension   Last three blood pressure readings:  BP Readings from Last 3 Encounters:   09/15/17 (!) 142/94   08/24/17 132/80   07/19/17 127/67     Blood pressure: FAILED    HTN Guidelines:  Age 18-59 BP range:  Less than 140/90  Age 60-85 with Diabetes:  Less than 140/90  Age 60-85 without Diabetes:  less than 150/90          Composite cancer screening  Chart review shows that this patient is due/due soon for the following None

## 2017-11-07 NOTE — LETTER
[facility]  [address] [place] [id number]  Phone: [phone]  November 30, 2017      Bryce Diaz  [address] [address] [place] [id number]      Dear Bryce,    We care about your health and have reviewed your health plan including your medical conditions, medications, and lab results.  Based on this review, it is recommended that you follow up regarding the following health topic(s):  -High Blood Pressure  -Wellness (Physical) Visit     We recommend you take the following action(s):  -Schedule a Physical      Please call us at the [facility] - [phone] (or use Mangia) to address the above recommendations.     Thank you for trusting [facility] and we appreciate the opportunity to serve you.  We look forward to supporting your healthcare needs in the future.    Healthy Regards,    Your Health Care Team  [facility]

## 2018-02-20 ENCOUNTER — TELEPHONE (OUTPATIENT)
Dept: FAMILY MEDICINE | Facility: OTHER | Age: 63
End: 2018-02-20

## 2018-02-20 NOTE — TELEPHONE ENCOUNTER
Summary:    Patient is due/failing the following:   BMP, BP check , FOLLOW UP, PHQ9 and PHYSICAL    Action needed:   Patient needs office visit for Physical., Patient needs to do PHQ9. and Patient needs non-fasting lab only appointment    Type of outreach:    Phone, left message for patient to call back.     Questions for provider review:    None                                                                                                                                    Sylvie Waddell     Chart routed to Care Team .        Panel Management Review      Patient has the following on his problem list:     Depression / Dysthymia review    Measure:  Needs PHQ-9 score of 4 or less during index window.  Administer PHQ-9 and if score is 5 or more, send encounter to provider for next steps.      PHQ-9 SCORE 9/25/2015 11/6/2015 4/29/2016   Total Score - - -   Total Score 13 13 17       If PHQ-9 recheck is 5 or more, route to provider for next steps.    Patient is due for:  PHQ9    Hypertension   Last three blood pressure readings:  BP Readings from Last 3 Encounters:   09/15/17 (!) 142/94   08/24/17 132/80   07/19/17 127/67     Blood pressure: FAILED    HTN Guidelines:  Age 18-59 BP range:  Less than 140/90  Age 60-85 with Diabetes:  Less than 140/90  Age 60-85 without Diabetes:  less than 150/90      Composite cancer screening  Chart review shows that this patient is due/due soon for the following None

## 2018-02-20 NOTE — LETTER
[facility]  [address] [place] [address]  Phone: [phone]  March 1, 2018      Bryce iDaz  [address] [address] [place] [id number]      Dear Bryce,    We care about your health and have reviewed your health plan including your medical conditions, medications, and lab results.  Based on this review, it is recommended that you follow up regarding the following health topic(s):  -High Blood Pressure  -Wellness (Physical) Visit     We recommend you take the following action(s):  -schedule a WELLNESS (Physical) APPOINTMENT.  We will perform the following labs: BMP (basic metabolic panel).     Please call us at the [facility] - [phone] (or use [employer]) to address the above recommendations.     Thank you for trusting [facility] and we appreciate the opportunity to serve you.  We look forward to supporting your healthcare needs in the future.    Healthy Regards,    Your Health Care Team  [facility]

## 2018-06-21 ENCOUNTER — TELEPHONE (OUTPATIENT)
Dept: FAMILY MEDICINE | Facility: OTHER | Age: 63
End: 2018-06-21

## 2018-06-21 DIAGNOSIS — I10 HYPERTENSION GOAL BP (BLOOD PRESSURE) < 140/90: Primary | ICD-10-CM

## 2018-06-21 NOTE — LETTER
[facility]  [address] [place] [address]  Phone: [phone]  July 30, 2018      Bryce Diaz  [address] [address] [place] [id number]      Dear Bryce,    We care about your health and have reviewed your health plan including your medical conditions, medications, and lab results.  Based on this review, it is recommended that you follow up regarding the following health topic(s):  -High Blood Pressure  -Wellness (Physical) Visit     We recommend you take the following action(s):  -schedule a WELLNESS (Physical) APPOINTMENT.  We will perform the following labs: BMP (basic metabolic panel).     Please call us at the [facility] - [phone] (or use [employer]) to address the above recommendations.     Thank you for trusting [facility] and we appreciate the opportunity to serve you.  We look forward to supporting your healthcare needs in the future.    Healthy Regards,    Your Health Care Team  [facility]

## 2018-06-21 NOTE — TELEPHONE ENCOUNTER
Summary:    Patient is due/failing the following:   PHQ9,BMP, BP CHECK and PHYSICAL    Action needed:   Patient needs office visit for physical . and Patient needs to do PHQ9.    Type of outreach:    Phone, left message for patient to call back.     Questions for provider review:    None                                                                                                                                    Sylvie Sharonmyriam       Chart routed to Care Team .        Panel Management Review      Patient has the following on his problem list:     Depression / Dysthymia review    Measure:  Needs PHQ-9 score of 4 or less during index window.  Administer PHQ-9 and if score is 5 or more, send encounter to provider for next steps.        PHQ-9 SCORE 9/25/2015 11/6/2015 4/29/2016   Total Score - - -   Total Score 13 13 17       If PHQ-9 recheck is 5 or more, route to provider for next steps.    Patient is due for:  PHQ9    Hypertension   Last three blood pressure readings:  BP Readings from Last 3 Encounters:   09/15/17 (!) 142/94   08/24/17 132/80   07/19/17 127/67     Blood pressure: FAILED    HTN Guidelines:  Age 18-59 BP range:  Less than 140/90  Age 60-85 with Diabetes:  Less than 140/90  Age 60-85 without Diabetes:  less than 150/90      Composite cancer screening  Chart review shows that this patient is due/due soon for the following None

## 2018-07-26 NOTE — TELEPHONE ENCOUNTER
LM asking patient to return our call.  Please assist in scheduling him for a physical with non-fasting labs.  Zoila Jolley, CMA

## 2018-11-20 ENCOUNTER — TELEPHONE (OUTPATIENT)
Dept: FAMILY MEDICINE | Facility: OTHER | Age: 63
End: 2018-11-20

## 2018-11-20 DIAGNOSIS — I10 HYPERTENSION GOAL BP (BLOOD PRESSURE) < 140/90: ICD-10-CM

## 2018-11-20 RX ORDER — CARVEDILOL 6.25 MG/1
6.25 TABLET ORAL 2 TIMES DAILY WITH MEALS
Qty: 4 TABLET | Refills: 0 | Status: SHIPPED | OUTPATIENT
Start: 2018-11-20 | End: 2018-11-26

## 2018-11-20 NOTE — TELEPHONE ENCOUNTER
REAL: given to get him to OV.  Will need to be seen for further refills.    BP Readings from Last 3 Encounters:   09/15/17 (!) 142/94   08/24/17 132/80   07/19/17 127/67     Last ov 11/23/2018    Next 5 appointments (look out 90 days)     Nov 23, 2018  9:00 AM CST   Office Visit with Camille Mckenzie NP   [facility]    [address] [facility] [address] [phone]                  Cameron Miguel, RN, BSN

## 2018-11-20 NOTE — TELEPHONE ENCOUNTER
Called patient informed him he is overdue to be seen in office. Has not been by Dr. Powell in 2 and half years and needs to be seen for a visit.   Patient agreed to schedule but wanted a refill. I informed him that Dr. Powell is out of office and a covering provider had declined a refill because of the length of time it has been since it has been since he has been seen. Patient was quite frustrated.   After huddling with provider and RN, it was agreed we will give 4 pills for patient to get to an appointment on Friday. This also did not make patient especially happy. I explained that we have to see him for regulare visit to make sure everything is working properly and that Dr. Powell can not continue to prescribe medication for him without seeing him on a semi regular schedule to make sure his blood pressure and physical exam is looking good.     Patient is scheduled with Alona Mckenzie due to Dr. Powell being scheduled out a ways and patient not being able to wait.     Rambo Osman,

## 2018-11-26 ENCOUNTER — OFFICE VISIT (OUTPATIENT)
Dept: FAMILY MEDICINE | Facility: CLINIC | Age: 63
End: 2018-11-26
Payer: COMMERCIAL

## 2018-11-26 VITALS
HEART RATE: 72 BPM | TEMPERATURE: 97.7 F | DIASTOLIC BLOOD PRESSURE: 98 MMHG | WEIGHT: 174.8 LBS | BODY MASS INDEX: 27.38 KG/M2 | SYSTOLIC BLOOD PRESSURE: 150 MMHG

## 2018-11-26 DIAGNOSIS — Z23 NEED FOR PROPHYLACTIC VACCINATION AND INOCULATION AGAINST INFLUENZA: ICD-10-CM

## 2018-11-26 DIAGNOSIS — Z23 NEED FOR VACCINATION: ICD-10-CM

## 2018-11-26 DIAGNOSIS — I10 HYPERTENSION GOAL BP (BLOOD PRESSURE) < 140/90: ICD-10-CM

## 2018-11-26 DIAGNOSIS — I10 ESSENTIAL HYPERTENSION: Primary | ICD-10-CM

## 2018-11-26 PROCEDURE — 90682 RIV4 VACC RECOMBINANT DNA IM: CPT | Performed by: NURSE PRACTITIONER

## 2018-11-26 PROCEDURE — 99214 OFFICE O/P EST MOD 30 MIN: CPT | Mod: 25 | Performed by: NURSE PRACTITIONER

## 2018-11-26 PROCEDURE — 90471 IMMUNIZATION ADMIN: CPT | Performed by: NURSE PRACTITIONER

## 2018-11-26 PROCEDURE — 90472 IMMUNIZATION ADMIN EACH ADD: CPT | Performed by: NURSE PRACTITIONER

## 2018-11-26 PROCEDURE — 90715 TDAP VACCINE 7 YRS/> IM: CPT | Performed by: NURSE PRACTITIONER

## 2018-11-26 RX ORDER — LOSARTAN POTASSIUM 50 MG/1
50 TABLET ORAL DAILY
Qty: 90 TABLET | Refills: 1 | Status: SHIPPED | OUTPATIENT
Start: 2018-11-26 | End: 2019-03-20

## 2018-11-26 RX ORDER — CARVEDILOL 6.25 MG/1
6.25 TABLET ORAL 2 TIMES DAILY
Qty: 180 TABLET | Refills: 1 | Status: SHIPPED | OUTPATIENT
Start: 2018-11-26 | End: 2019-03-20

## 2018-11-26 NOTE — MR AVS SNAPSHOT
After Visit Summary   11/26/2018    Bryce Diaz    MRN: [id number]           Patient Information     Date Of Birth          1955        Visit Information        Provider Department      11/26/2018 5:40 PM Lenore Cornejo APRN [facility]        Today's Diagnoses     Essential hypertension    -  1    Need for vaccination        Need for prophylactic vaccination and inoculation against influenza          Care Instructions    Add in Losartan 50 mg daily   Continue Carvedilol  ASA 81 mg daily       Uncontrolled High Blood Pressure (Established)    Your blood pressure was unusually high today. This can occur if you ve missed doses of your blood pressure medicine. Or it can happen if you are taking other medicines. These include some asthma inhalers, decongestants, diet pills, and street drugs like cocaine and amphetamine.  Other causes include:    Weight gain    More salt in your diet    Smoking    Caffeine  Your blood pressure can also rise if you are emotionally upset or in intense pain. It may go back to normal after a period of rest.  Blood pressure measurements are given as 2 numbers. Systolic blood pressure is the upper number. This is the pressure when the heart contracts. Diastolic blood pressure is the lower number. This is the pressure when the heart relaxes between beats. You will see your blood pressure readings written together. For example, a person with a systolic pressure of 118 and a diastolic pressure of 78 will have 118/78 written in the medical record. To be high blood pressure, the numbers must be higher when tested over a period of time.  Blood pressure is categorized as normal, elevated, or stage 1 or stage 2 high blood pressure:    Normal blood pressure is systolic of less than 120 and diastolic of less than 80 (120/80)    Elevated blood pressure is systolic of 120 to 129 and diastolic less than 80    Stage 1 high blood pressure is systolic is  130 to 139 or diastolic between 80 to 89    Stage 2 high blood pressure is when systolic is 140 or higher or the diastolic is 90 or higher  Uncontrolled high blood pressure can cause serious health problems. It raises your risk for heart attack, stroke, and heart failure. In general, if you have high blood pressure, keeping your blood pressure below 130/80 mmHg may help prevent these problems. Your healthcare provider may prescribe medicine to help control blood pressure if lifestyle changes are not enough.  Home care  It s important to take steps to lower your blood pressure. If you are taking blood pressure medicine, the guidelines below may help you need less or no medicines in the future.    Start a weight-loss program if you are overweight.    Cut back on the amount of salt in your diet:  ? Avoid high-salt foods like olives, pickles, smoked meats, and salted potato chips.  ? Don t add salt to your food at the table.  ? Use only small amounts of salt when cooking.    Start an exercise program. Talk with your healthcare provider about what exercise program is best for you. It doesn t have to be difficult. Even brisk walking for 20 minutes 3 times a week is a good form of exercise.    Avoid medicines that stimulates the heart. This includes many over-the-counter cold and sinus decongestant pills and sprays, as well as diet pills. Check the warnings about high blood pressure on the label. Before purchasing any over-the-counter medicines or supplements, always ask the pharmacist about the product's potential interaction with your high blood pressure and your medicines.    Stimulants such as amphetamine or cocaine could be lethal for someone with high blood pressure. Never take these.    Limit how much caffeine you drink. Or switch to noncaffeinated beverages.    Stop smoking. If you are a long-time smoker, this can be hard. Enroll in a stop-smoking program to make it more likely that you will succeed. Talk with your  provider about ways to quit.    Learn how to handle stress better. This is an important part of any program to lower blood pressure. Learn ways to relax. These include meditation, yoga, and biofeedback.    If medicines were prescribed, take them exactly as directed. Missing doses may cause your blood pressure to get out of control.    If you miss a dose or doses of your medicines, check with your healthcare provider or pharmacist about what to do.    Consider buying an automatic blood pressure machine. Your provider may recommend a certain type. You can get one of these at most pharmacies. Measure your blood pressure twice a day, in the morning, and in the late afternoon. Keep a written record of your home blood pressure readings and take the record to your medical appointments.  Here are some additional guidelines on home blood pressure monitoring from the American Heart Association.    Don't smoke or drink coffee for 30 minutes    Go to the bathroom before the test.    Relax for 5 minutes before taking the measurement.    Sit correctly. Be sure your back is supported. Don't sit on a couch or soft chair. Uncross your feet and place them flat on the floor. Place your arm on a solid, flat surface like a table with the upper arm at heart level. Make certain the middle of the cuff is directly above the bend of the elbow. Check the monitor's instruction manual for an illustration.    Take multiple readings. When you measure, take 2 or 3 readings one minute apart and record all of the results.    Take your blood pressure at the same time every day, or as your healthcare provider recommends.    Record the date, time, and blood pressure reading.    Take the record with you to your next appointment. If your blood pressure monitor has a built-in memory, simply take the monitor with you to your next appointment.    Call your provider if you have several high readings. Don't be frightened by a single high reading, but if you  get several high readings, check in with your healthcare provider.    Note: When blood pressure reaches a systolic (top number) of 180 or higher or a diastolic (bottom number) of 110 or higher, emergency medical treatment is required. Call your healthcare provider immediately.  Follow-up care  Regular visits to your own healthcare provider for blood pressure and medicine checks are an important part of your care. Make a follow-up appointment as directed. Bring the record of your home blood pressure readings to the appointment.  When to seek medical advice  Call your healthcare provider right away if any of these occur:    Blood pressure reaches a systolic (top number) of 180 or higher or diastolic (bottom number) of 110 or higher, emergency medical treatment is required.    Chest, arm, shoulder, neck, or upper back pain    Shortness of breath    Severe headache    Throbbing or rushing sound in the ears    Nosebleed    Extreme drowsiness, confusion, or fainting    Dizziness or dizziness with spinning sensation (vertigo)    Weakness in an arm or leg or on one side of the face    Trouble speaking or seeing   Date Last Reviewed: 1/1/2017 2000-2018 [employer]. [address] [place] [address]. All rights reserved. This information is not intended as a substitute for professional medical care. Always follow your healthcare professional's instructions.        Prevention Guidelines, Men Ages 50 to 64  Screening tests and vaccines are an important part of managing your health. A screening test is done to find possible disorders or diseases in people who don't have any symptoms. The goal is to find a disease early so lifestyle changes can be made and you can be watched more closely to reduce the risk of disease, or to detect it early enough to treat it most effectively. Screening tests are not considered diagnostic, but are used to determine if more testing is needed. Health counseling is essential,  too. Below are guidelines for these, for men ages 50 to 64. Talk with your healthcare provider to make sure you re up-to-date on what you need.  Screening Who needs it How often   Alcohol misuse All men in this age group At routine exams   Blood pressure All men in this age group Yearly checkup if your blood pressure is normal  Normal blood pressure is less than 120/80 mm Hg  If your blood pressure reading is higher than normal, follow the advice of your healthcare provider      Colorectal cancer All men in this age group Flexible sigmoidoscopy every 5 years, or colonoscopy every 10 years, or double-contrast barium enema every 5 years; yearly fecal occult blood test or fecal immunochemical test; or a stool DNA test as often as your healthcare provider advises; talk with your healthcare provider about which tests are best for you   Depression All men in this age group At routine exams   Type 2 diabetes or prediabetes All men beginning at age 45 and men without symptoms at any age who are overweight or obese and have 1 or more other risk factors for diabetes At least every 3 years (yearly if your blood sugar has already begun to rise)   Type 2 diabetes All men with prediabetes Every year   Hepatitis C Men at increased risk for infection - talk with your healthcare provider At routine exams. All men ages 50 to 70 should be tested at least once for hepatitis C.   High cholesterol or triglycerides All men in this age group At least every 5 years   HIV Men at increased risk for infection - talk with your healthcare provider At routine exams   Lung cancer Adults age 55 to 80 who have smoked Yearly screening in smokers with 30 pack-year history of smoking or who quit within 15 years   Obesity All men in this age group At routine exams   Prostate cancer Starting at age 45, talk to healthcare provider about risks and benefits of digital rectal exam (LAUREN) and prostate-specific antigen (PSA) screening1 At routine exams    Syphilis Men at increased risk for infection - talk with your healthcare provider At routine exams   Tuberculosis Men at increased risk for infection - talk with your healthcare provider Ask your healthcare provider   Vision All men in this age group Ask your healthcare provider   Vaccine Who needs it How often   Chickenpox (varicella) All men in this age group who have no record of this infection or vaccine 2 doses; second dose should be given at least 4 weeks after the first dose   Hepatitis A Men at increased risk for infection - talk with your healthcare provider 2 doses given at least 6 months apart   Hepatitis B Men at increased risk for infection - talk with your healthcare provider 3 doses over 6 months; second dose should be given 1 month after the first dose; the third dose should be given at least 2 months after the second dose and at least 4 months after the first dose   Haemophilus influenzae Type B (HIB) Men at increased risk for infection - talk with your healthcare provider 1 to 3 doses   Influenza (flu) All men in this age group Once a year   Measles, mumps, rubella (MMR) Men in this age group through their late 50s who have no record of these infections or vaccines 1 or 2 doses; ask your healthcare provider   Meningococcal Men at increased risk for infection - talk with your healthcare provider 1 or more doses   Pneumococcal conjugate vaccine (PCV13) and pneumococcal polysaccharide vaccine (PPSV23) Men at increased risk for infection - talk with your healthcare provider PCV13: 1 dose ages 19 to 65 (protects against 13 types of pneumococcal bacteria)     PPSV23: 1 to 2 doses through age 64, or 1 dose at 65 or older (protects against 23 types of pneumococcal bacteria)      Tetanus/diphtheria/  pertussis (Td/Tdap) booster All men in this age group Td every 10 years, or a one-time dose of Tdap instead of a Td booster after age 18, then Td every 10 years   Zoster All men ages 60 and older 1 dose    Counseling Who needs it How often   Diet and exercise Men who are overweight or obese When diagnosed, and then at routine exams   Sexually transmitted infection prevention Men at increased risk for infection - talk with your healthcare provider At routine exams   Use of daily aspirin Men in this age group at risk for cardiovascular health problems At routine exams   Use of tobacco and the health effects it can cause All men in this age group Every visit   [address] [place] [address] Cancer [facility]  Date Last Reviewed: 2/1/2017 2000-2018 [employer]. [address] [place] [address], [place] [address]. All rights reserved. This information is not intended as a substitute for professional medical care. Always follow your healthcare professional's instructions.        Living with Osteoarthritis    Osteoarthritis is a chronic disease and the most common type of arthritis. But it doesn t have to keep you from leading an active life. You can help control symptoms by exercising and losing weight if you are overweight. Using special tools also helps make life easier. Be sure to see your healthcare provider for scheduled checkups and lab work. If you have questions or concerns between office visits, call your healthcare provider's office.  Make exercise part of your life  Gentle exercise can help lessen your pain. Keep the following in mind:    Choose exercises that improve joint motion and make your muscles stronger. Your healthcare provider or a physical therapist may suggest a few.    Stretching and flexibility activities such as yoga and lara chi may improve pain and joint motion.    Try low-impact sports, such as walking, biking, or doing exercises in a warm pool.    Most people should exercise for at least 30 minutes a day on most days of the week. This can be broken up into shorter periods throughout the day.    Don t push yourself too hard at first. Slowly build up over time.    Make sure you warm up for 5 to 10  minutes before you exercise.    If pain and stiffness increase, don't exercise as hard or as long.  Watch your weight  If you weigh more than you should, your weight-bearing joints are under extra pressure. This makes your symptoms worse. To reduce pain and stiffness, try losing a few of those extra pounds. The tips below may help:    Start a weight-loss program with the help of your healthcare provider.    Ask your friends and family for support.    Join a weight-loss group.  Use special tools  Even simple tasks can be hard to do when your joints hurt. Special tools called assistive devices can make things easier by reducing strain and protecting your joints. Ask your healthcare provider where to find these and other helpful tools:    Long-handled reachers or grabbers    Jar openers and button threaders    Large  for pencils, garden tools, and other handheld objects  Use mobility and other aids  People with arthritis and other joint problems often use mobility aids to help with walking. For example, they may use canes or walkers. They may also use splints or braces to support joints. Talk with your healthcare provider or physical therapist about these aids:    A cane to reduce knee or hip pain and help prevent falls    Splints for your wrists or other joints    A brace to support a weak knee joint    Orthotics for toe and foot involvement  Medical and surgical treatments  Discuss medical treatments with your healthcare provider to help reduce your pain and improve joint mobility. Treatments may include:    Topical medicines such as lidocaine, capsaicin, and diclofenac gel    Oral medicines such as acetaminophen, NSAIDs (nonsteroidal anti-inflammatory drugs) such as ibuprofen and naproxen, or opioids    Injections in affected joints such as corticosteroids in various joints, or hyaluronic acid in the knee joints    Surgical repair or surgical joint replacement with artificial joints    Complementary therapies  such as heat and cold treatment, massage, acupuncture, supplements, cognitive training, meditation, and others. Discuss these options with your healthcare provider.  Date Last Reviewed: 6/1/2018 2000-2018 [email or web address]. [address] [place] [id number]. All rights reserved. This information is not intended as a substitute for professional medical care. Always follow your healthcare professional's instructions.        Osteoarthritis: Common Sites  Osteoarthritis (OA) is sometimes called degenerative joint disease or wear-and-tear arthritis. It's the most common type of arthritis. In OA, the cartilage wears away. Cartilage covers the ends of bones and acts as a cushion. If enough cartilage wears away, bone rubs against bone. The joint changes in OA cause pain, stiffness, and trouble moving. OA may occur in any joint. Some joints that are commonly affected are the spine, neck,  hips, knees, fingers, and toes.  Neck    Joints between small bones in the neck may wear out. Pain may travel to the shoulder or the base of the skull.  Lumbar spine  Bony spurs may form on the joints between the vertebrae (spinal bones). And disks (cushions of cartilage between vertebrae) may wear down. Pain may affect the lower back or leg.  Hip  Cartilage damage can occur in the large  ball and socket  joint that connects the pelvis and thighbone (femur). Pain may travel to the groin, buttocks, or knee.  Knee  The cartilage in the knee joint may wear down. Weakness or instability in the knee joint may make walking or climbing stairs difficult.  Fingers  Finger joints may become enlarged and knobby. Grasping objects may be hard, especially if the joint at the base of the thumb is affected.  Toes  Toes may be affected. Arthritis may cause a bunion, a bump at the base of the big toe. Standing or walking may be painful.  Date Last Reviewed: 6/1/2018 2000-2018 [email or web address]. [address] [place] [address]  [place] [address]. All rights reserved. This information is not intended as a substitute for professional medical care. Always follow your healthcare professional's instructions.        Osteoarthritis: Coping with Pain    There are many ways to control your pain. You re making a good start by learning about osteoarthritis and its treatments. Knowing more about this condition helps you work with your healthcare provider to find answers to problems. Keeping a positive outlook can help you manage pain from day to day. And making time each day to relax and enjoy yourself may help you control osteoarthritis pain, instead of letting it control you. Try these methods to help you cope with, and even reduce, your pain.  Take control  Relaxing may help relieve muscle aches that result from joint pain. To relax, try these techniques:    Breathe slowly and calmly and think of a peaceful scene.    Meditate by focusing your mind on one word, object, or idea.  Getting plenty of sleep can help reduce pain and let you function better. If pain is making it hard for you to sleep, ask your healthcare provider about ways to control pain and ensure a good night s sleep. Cutting back on caffeine and alcohol can help you sleep better. So can going to bed and getting up at about the same time every day.  Use distraction  Getting your mind off the pain may seem hard to do. But it can actually help reduce pain. When you are in pain, try one of these ways of distracting yourself:    Watch a funny movie with a friend.    Listen to music you enjoy.    Read a novel.    Talk with friends or family.    Go to a museum, park, or other favorite attraction.    Arrange to do a regular activity, such as volunteer work.  Heat and cold  Using heat and cold treatments are simple ways to lessen arthritis symptoms:    Heat soothes stiff joints and tired muscles. Heat works well before exercise, for example. Heat treatments include:  ? A warm shower or bath, or soak  (for example, fill the sink with warm water and move your fingers, hands, and wrists around in the water)  ? A moist heating pad  ? A warm, moist wash cloth  ? An electric blanket or throw    Cold treatments help to numb painful areas and decrease swelling. Cold treatments include the following wrapped in a thin towel:  ? An ice pack or bag of ice. To make an ice pack, put ice cubes in a plastic bag that seals at the top.  ? A gel-filled cold pack  Be careful when using heat or cold. You can injure your skin. Each treatment should only last for 10 to 20 minutes. Your healthcare provider or therapist can give you specific instructions.  Acupuncture  Acupuncture is a 2,000-year-old practice. Providers insert thin needles in specific parts of the body. Research shows that it can help to relieve the pain of arthritis.  For more information or to find a provider in your area, contact the American Academy of Medical Acupuncture. Its website is: www.medicalacupuncture.org/.  Massage  Therapeutic massage has many benefits. It may:    Help you and your muscles relax    Improve blood flow to muscles and joints    Help joints stay more flexible  Look for a certified massage therapist. Many are trained to treat sore muscles and joint pain and stiffness.  Vitamins, supplements, and herbs  People with arthritis, or other long-term conditions that cause pain, often look for alternative ways to lessen pain. Vitamins, supplements, and herbs may or may not help you to feel better. Before you try any vitamin, supplement, or herb, make sure you ask your healthcare provider or pharmacist.  Physical therapy and occupational therapy  Evaluation by a physical therapist and or occupational therapist for assessment for limitations in activities of daily living  Help with developing an appropriate exercise routine for both muscle strengthening and cardiovascular health  Weight management  Studies have shown that weight loss in overweight  people can improve osteoarthritis symptoms.  Talk with your healthcare provider about your optimal ideal weight and weight management methods if needed.  Psychological treatments  Research shows that many psychological therapies or those that deal with thinking and emotions, help people cope with arthritis pain. Therapies include cognitive behavioral therapy (CBT), pain coping skills training, biofeedback, stress management, and hypnosis. Ask your healthcare provider for more information about these therapies.  For more information about many of these methods, contact the National Center for Complementary and Alternative Medicine (NCCAM) at  https://[facility].nih.gov.  Date Last Reviewed: 6/1/2018 2000-2018 [employer]. [address] [place], [place] [id number]. All rights reserved. This information is not intended as a substitute for professional medical care. Always follow your healthcare professional's instructions.        Osteoarthritis: Natural and Alternative Treatments     Therapeutic massage is one alternative treatment option.   The treatment for osteoarthritis includes lifestyle changes like weight loss and exercise. Medicines and surgery may also be part of the treatment. There are also many natural and alternative treatments. These treatments may also help relieve pain and stiffness caused by osteoarthritis.  Heat and cold  Using heat and cold treatments are simple ways to lessen arthritis symptoms:    Heat soothes stiff joints and tired muscles. Heat works well before exercise, for example. Heat treatments include:  ? A warm shower or bath, or soak (for example, fill the sink with warm water and move your fingers, hands, and wrists around in the water)  ? A moist heating pad  ? A warm, moist wash cloth  ? An electric blanket or throw    Cold treatments help to numb painful areas and decrease swelling. Cold treatments include the following wrapped in a thin towel:  ? An ice pack. To make an  ice pack, put ice cubes in a plastic bag that seals at the top.  ? A gel-filled cold pack  Be careful when using heat or cold. You can injure your skin. Each treatment should only last for 10 to 20 minutes. Your healthcare provider or therapist can give you specific instructions.     Meditation and relaxation  Meditation and relaxation can help you deal with arthritis pain. There are many different methods available including deep breathing exercises, meditation, and yoga. Look for information and programs online or in your community. Or try this simple deep breathing method sometimes called belly breathin. Sit in a comfortable chair or lie on your back.   2. Put one hand on your chest and the other hand on your stomach.  3. Take a breath in through your nose. The hand on your stomach should rise. The hand on your chest should move very little.  4. Breathe out through your mouth, pushing out as much air as you can. The hand on your stomach should move in as you breathe out, but the hand on your chest should move very little. You should feel the muscles of your stomach tighten.   5. Continue to breathe in through your nose and out through your mouth. You should feel your stomach rise and fall. Count slowly each time you breathe out.  Acupuncture  Acupuncture is a 2,000-year-old practice. Providers insert thin needles in specific parts of the body. Research shows that it can help to relieve the pain of arthritis.   For more information or to find a provider in your area, contact the American Academy of Medical Acupuncture at www.medicalacupuncture.org/.  Massage  Therapeutic massage has many benefits. It may:    Help you and your muscles relax    Improve blood flow to muscles and joints    Help joints stay more flexible.  Look for a certified massage therapist. Many are trained to treat sore muscles and joint pain and stiffness.  Vitamins, supplements, and herbs  People with arthritis, or other long-term  conditions that cause pain, often look for alternative ways to lessen pain. Vitamins, supplements, and herbs may or may not help you to feel better. Before you try any vitamin, supplement, or herb, make sure you ask your healthcare provider or pharmacist.  Physical therapy and occupational therapy    Evaluation by a physical therapist and or occupational therapist for assessment for limitations in activities of daily living    Help with developing an appropriate exercise routine for both muscle strengthening and cardiovascular health  Weight management    Studies have shown that weight loss in overweight people can improve osteoarthritis symptoms    Talk with your healthcare provider about your optimal ideal weight and weight management methods if needed.   Psychological treatments  Research shows that many psychological therapies or those that deal with thinking and emotions, help people cope with arthritis pain. Therapies include: cognitive behavioral therapy (CBT), pain coping skills training, biofeedback, stress management, and hypnosis. Ask your healthcare provider for more information about these therapies.  For more information about many of these methods, contact the National Center for Complementary and Alternative Medicine at  https://nccih.nih.gov.  Date Last Reviewed: 6/1/2018 2000-2018 [employer]. [address] [place] [address]. All rights reserved. This information is not intended as a substitute for professional medical care. Always follow your healthcare professional's instructions.                Follow-ups after your visit        Follow-up notes from your care team     Return in about 2 months (around 1/26/2019) for HTN.      Future tests that were ordered for you today     Open Future Orders        Priority Expected Expires Ordered    **Basic metabolic panel FUTURE 2mo Routine 1/25/2019 3/26/2019 11/26/2018            Who to contact     If you have questions or need  follow up information about today's clinic visit or your schedule please contact [facility] directly at [phone].  Normal or non-critical lab and imaging results will be communicated to you by MyChart, letter or phone within 4 business days after the clinic has received the results. If you do not hear from us within 7 days, please contact the clinic through [employer] or phone. If you have a critical or abnormal lab result, we will notify you by phone as soon as possible.  Submit refill requests through [employer] or call your pharmacy and they will forward the refill request to us. Please allow 3 business days for your refill to be completed.          Additional Information About Your Visit        [employer] Information     [employer] gives you secure access to your electronic health record. If you see a primary care provider, you can also send messages to your care team and make appointments. If you have questions, please call your primary care clinic.  If you do not have a primary care provider, please call [phone] and they will assist you.        Care EveryWhere ID     This is your Care EveryWhere ID. This could be used by other organizations to access your [place] medical records  [id number]        Your Vitals Were     Pulse Temperature BMI (Body Mass Index)             72 97.7  F (36.5  C) (Tympanic) 27.38 kg/m2          Blood Pressure from Last 3 Encounters:   11/26/18 (!) 150/98   09/15/17 (!) 142/94   08/24/17 132/80    Weight from Last 3 Encounters:   11/26/18 174 lb 12.8 oz (79.3 kg)   08/24/17 168 lb 11.2 oz (76.5 kg)   07/19/17 108 lb (49 kg)              We Performed the Following     ADMIN 1st VACCINE     FLU VACCINE, (RIV4) RECOMBINANT HA  , IM (FluBlok, egg free) [62433]- >18 YRS (FMG recommended  50-64 YRS)     TDAP VACCINE (ADACEL)     Vaccine Administration, Each Additional [82035]          Today's Medication Changes          These changes are accurate as of 11/26/18  6:17 PM.   If you have any questions, ask your nurse or doctor.               Start taking these medicines.        Dose/Directions    losartan 50 MG tablet   Commonly known as:  COZAAR   Used for:  Essential hypertension   Started by:  Lenore Cornejo APRN CNP        Dose:  50 mg   Take 1 tablet (50 mg) by mouth daily   Quantity:  90 tablet   Refills:  1            Where to get your medicines      These medications were sent to [facility] [place] [id number] [place] [address]     Phone:  [phone]     losartan 50 MG tablet                Primary Care Provider Office Phone # Fax #    Jesus Sigifredo Powell [phone] [address] [place] [address]        Equal Access to Services     [facility] : Hadii julián bell hadasho Soomaali, waaxda luqadaha, qaybta kaalmada adeegyada, alexander vance . So [facility] [phone].    ATENCIÓN: Si habla español, tiene a arevalo disposición servicios gratuitos de asistencia lingüística. [facility] [phone].    We comply with applicable federal civil rights laws and [place] laws. We do not discriminate on the basis of race, color, national origin, age, disability, sex, sexual orientation, or gender identity.            Thank you!     Thank you for choosing [facility]  for your care. Our goal is always to provide you with excellent care. Hearing back from our patients is one way we can continue to improve our services. Please take a few minutes to complete the written survey that you may receive in the mail after your visit with us. Thank you!             Your Updated Medication List - Protect others around you: Learn how to safely use, store and throw away your medicines at www.disposemymeds.org.          This list is accurate as of 11/26/18  6:17 PM.  Always use your most recent med list.                   Brand Name Dispense Instructions for use Diagnosis     carvedilol 6.25 MG tablet    COREG    4 tablet    Take 1 tablet (6.25 mg) by mouth 2 times daily (with meals) Start with 1/2 tablet twice daily for 1-2 weeks.  Increase if no side effects and uncontrolled BP.    Hypertension goal BP (blood pressure) < 140/90       losartan 50 MG tablet    COZAAR    90 tablet    Take 1 tablet (50 mg) by mouth daily    Essential hypertension

## 2018-11-26 NOTE — NURSING NOTE
"No chief complaint on file.      Initial BP (!) 150/98 (BP Location: Right arm, Patient Position: Sitting, Cuff Size: Adult Regular)  Pulse 72  Temp 97.7  F (36.5  C) (Tympanic)  Wt 174 lb 12.8 oz (79.3 kg)  BMI 27.38 kg/m2 Estimated body mass index is 27.38 kg/(m^2) as calculated from the following:    Height as of 8/24/17: 5' 7\" (1.702 m).    Weight as of this encounter: 174 lb 12.8 oz (79.3 kg).    Patient presents to the clinic using No DME    Health Maintenance that is potentially due pending provider review:  NONE    n/a    Is there anyone who you would like to be able to receive your results? No  If yes have patient fill out WIL      Yarelis Montague CMA    "

## 2018-11-26 NOTE — PROGRESS NOTES
SUBJECTIVE:   Bryce Diaz is a 63 year old male who presents to clinic today for the following health issues:    Hypertension Follow-up      Outpatient blood pressures are being checked at home.  Results are 135-140/90.    Low Salt Diet: not monitoring salt      Amount of exercise or physical activity: None    Problems taking medications regularly: No    Medication side effects: nausea     Diet: regular (no restrictions)    BP Readings from Last 3 Encounters:   11/26/18 (!) 150/98   09/15/17 (!) 142/94   08/24/17 132/80     Flu and TDAP given.  Felt the coreg at higher dose made him really nauseated.   Went back down to 6.25 mg and is not nauseated with this.   ASA 81 mg daily - NOT TAKING  No problems with ED at this time and is very concerned about BP and Meds causing this.    H/o Migraines since TBI Fell out of a tree age 22/ concussion  Has scar tissue there.   OTC migraine medication with aspirin   Taking caffeine daily so he doesn't get the migraines.   Migraines for years. Cold coffee daily and migraine medication- excedrine migraine.  This helps the migraines stay away.  Sleep study. NO APNEA    Osteoarthritis starting hands            -------------------------------------    Problem list and histories reviewed & adjusted, as indicated.  Additional history: as documented    BP Readings from Last 3 Encounters:   11/26/18 (!) 150/98   09/15/17 (!) 142/94   08/24/17 132/80    Wt Readings from Last 3 Encounters:   11/26/18 174 lb 12.8 oz (79.3 kg)   08/24/17 168 lb 11.2 oz (76.5 kg)   07/19/17 108 lb (49 kg)                  Labs reviewed in EPIC    Reviewed and updated as needed this visit by clinical staff       Reviewed and updated as needed this visit by Provider         ROS:   ROS: 10 point ROS neg other than the symptoms noted above in the HPI.      OBJECTIVE:                                                    BP (!) 150/98 (BP Location: Right arm, Patient Position: Sitting, Cuff Size: Adult Regular)   Pulse 72  Temp 97.7  F (36.5  C) (Tympanic)  Wt 174 lb 12.8 oz (79.3 kg)  BMI 27.38 kg/m2  Body mass index is 27.38 kg/(m^2).   GENERAL: healthy, alert, well nourished, well hydrated, no distress  HENT: ear canals- normal; TMs- normal; Nose- normal; Mouth- no ulcers, no lesions  NECK: no tenderness, no adenopathy, no asymmetry, no masses, no stiffness; thyroid- normal to palpation  RESP: lungs clear to auscultation - no rales, no rhonchi, no wheezes  CV: regular rates and rhythm, normal S1 S2, no S3 or S4 and no murmur, no click or rub -  ABDOMEN: soft, no tenderness, no  hepatosplenomegaly, no masses, normal bowel sounds      LABS declined today        ASSESSMENT/PLAN:                                                    1. Need for vaccination  Done today.   - TDAP VACCINE (ADACEL)  - ADMIN 1st VACCINE    2. Need for prophylactic vaccination and inoculation against influenza  Done today  - FLU VACCINE, (RIV4) RECOMBINANT HA  , IM (FluBlok, egg free) [32778]- >18 YRS (FMG recommended  50-64 YRS)  - Vaccine Administration, Each Additional [83454]    3. Essential hypertension  Poor control  DASH diet encouraged.  Decrease caffeine consumption  Restart ASA 81 mg daily  Continue Coreg at 6.25 mg bid.   Restart   - losartan (COZAAR) 50 MG tablet; Take 1 tablet (50 mg) by mouth daily  Dispense: 90 tablet; Refill: 1  Recheck BP in 2 weeks.   Patient in agreement to come for labs after first of the year.   Future order placed.   - **Basic metabolic panel FUTURE 2mo; Future    4. Hypertension goal BP (blood pressure) < 140/90  - carvedilol (COREG) 6.25 MG tablet; Take 1 tablet (6.25 mg) by mouth 2 times daily Start with 1/2 tablet twice daily for 1-2 weeks.  Increase if no side effects and uncontrolled BP.  Dispense: 180 tablet; Refill: 1      Follow up with Provider - Call or return to the clinic with any worsening of symptoms or no resolution. Patient/Parent verbalized understanding and is in agreement. Medication side  effects reviewed.   Current Outpatient Prescriptions   Medication Sig Dispense Refill     carvedilol (COREG) 6.25 MG tablet Take 1 tablet (6.25 mg) by mouth 2 times daily Start with 1/2 tablet twice daily for 1-2 weeks.  Increase if no side effects and uncontrolled BP. 180 tablet 1     losartan (COZAAR) 50 MG tablet Take 1 tablet (50 mg) by mouth daily 90 tablet 1     [DISCONTINUED] carvedilol (COREG) 6.25 MG tablet Take 1 tablet (6.25 mg) by mouth 2 times daily (with meals) Start with 1/2 tablet twice daily for 1-2 weeks.  Increase if no side effects and uncontrolled BP. 4 tablet 0       See Patient Instructions    [facility]

## 2018-11-27 NOTE — PATIENT INSTRUCTIONS
Add in Losartan 50 mg daily   Continue Carvedilol  ASA 81 mg daily       Uncontrolled High Blood Pressure (Established)    Your blood pressure was unusually high today. This can occur if you ve missed doses of your blood pressure medicine. Or it can happen if you are taking other medicines. These include some asthma inhalers, decongestants, diet pills, and street drugs like cocaine and amphetamine.  Other causes include:    Weight gain    More salt in your diet    Smoking    Caffeine  Your blood pressure can also rise if you are emotionally upset or in intense pain. It may go back to normal after a period of rest.  Blood pressure measurements are given as 2 numbers. Systolic blood pressure is the upper number. This is the pressure when the heart contracts. Diastolic blood pressure is the lower number. This is the pressure when the heart relaxes between beats. You will see your blood pressure readings written together. For example, a person with a systolic pressure of 118 and a diastolic pressure of 78 will have 118/78 written in the medical record. To be high blood pressure, the numbers must be higher when tested over a period of time.  Blood pressure is categorized as normal, elevated, or stage 1 or stage 2 high blood pressure:    Normal blood pressure is systolic of less than 120 and diastolic of less than 80 (120/80)    Elevated blood pressure is systolic of 120 to 129 and diastolic less than 80    Stage 1 high blood pressure is systolic is 130 to 139 or diastolic between 80 to 89    Stage 2 high blood pressure is when systolic is 140 or higher or the diastolic is 90 or higher  Uncontrolled high blood pressure can cause serious health problems. It raises your risk for heart attack, stroke, and heart failure. In general, if you have high blood pressure, keeping your blood pressure below 130/80 mmHg may help prevent these problems. Your healthcare provider may prescribe medicine to help control blood pressure if  lifestyle changes are not enough.  Home care  It s important to take steps to lower your blood pressure. If you are taking blood pressure medicine, the guidelines below may help you need less or no medicines in the future.    Start a weight-loss program if you are overweight.    Cut back on the amount of salt in your diet:  ? Avoid high-salt foods like olives, pickles, smoked meats, and salted potato chips.  ? Don t add salt to your food at the table.  ? Use only small amounts of salt when cooking.    Start an exercise program. Talk with your healthcare provider about what exercise program is best for you. It doesn t have to be difficult. Even brisk walking for 20 minutes 3 times a week is a good form of exercise.    Avoid medicines that stimulates the heart. This includes many over-the-counter cold and sinus decongestant pills and sprays, as well as diet pills. Check the warnings about high blood pressure on the label. Before purchasing any over-the-counter medicines or supplements, always ask the pharmacist about the product's potential interaction with your high blood pressure and your medicines.    Stimulants such as amphetamine or cocaine could be lethal for someone with high blood pressure. Never take these.    Limit how much caffeine you drink. Or switch to noncaffeinated beverages.    Stop smoking. If you are a long-time smoker, this can be hard. Enroll in a stop-smoking program to make it more likely that you will succeed. Talk with your provider about ways to quit.    Learn how to handle stress better. This is an important part of any program to lower blood pressure. Learn ways to relax. These include meditation, yoga, and biofeedback.    If medicines were prescribed, take them exactly as directed. Missing doses may cause your blood pressure to get out of control.    If you miss a dose or doses of your medicines, check with your healthcare provider or pharmacist about what to do.    Consider buying an  automatic blood pressure machine. Your provider may recommend a certain type. You can get one of these at most pharmacies. Measure your blood pressure twice a day, in the morning, and in the late afternoon. Keep a written record of your home blood pressure readings and take the record to your medical appointments.  Here are some additional guidelines on home blood pressure monitoring from the American Heart Association.    Don't smoke or drink coffee for 30 minutes    Go to the bathroom before the test.    Relax for 5 minutes before taking the measurement.    Sit correctly. Be sure your back is supported. Don't sit on a couch or soft chair. Uncross your feet and place them flat on the floor. Place your arm on a solid, flat surface like a table with the upper arm at heart level. Make certain the middle of the cuff is directly above the bend of the elbow. Check the monitor's instruction manual for an illustration.    Take multiple readings. When you measure, take 2 or 3 readings one minute apart and record all of the results.    Take your blood pressure at the same time every day, or as your healthcare provider recommends.    Record the date, time, and blood pressure reading.    Take the record with you to your next appointment. If your blood pressure monitor has a built-in memory, simply take the monitor with you to your next appointment.    Call your provider if you have several high readings. Don't be frightened by a single high reading, but if you get several high readings, check in with your healthcare provider.    Note: When blood pressure reaches a systolic (top number) of 180 or higher or a diastolic (bottom number) of 110 or higher, emergency medical treatment is required. Call your healthcare provider immediately.  Follow-up care  Regular visits to your own healthcare provider for blood pressure and medicine checks are an important part of your care. Make a follow-up appointment as directed. Bring the record  of your home blood pressure readings to the appointment.  When to seek medical advice  Call your healthcare provider right away if any of these occur:    Blood pressure reaches a systolic (top number) of 180 or higher or diastolic (bottom number) of 110 or higher, emergency medical treatment is required.    Chest, arm, shoulder, neck, or upper back pain    Shortness of breath    Severe headache    Throbbing or rushing sound in the ears    Nosebleed    Extreme drowsiness, confusion, or fainting    Dizziness or dizziness with spinning sensation (vertigo)    Weakness in an arm or leg or on one side of the face    Trouble speaking or seeing   Date Last Reviewed: 1/1/2017 2000-2018 [employer]. [address] [place] [address], [place] [address]. All rights reserved. This information is not intended as a substitute for professional medical care. Always follow your healthcare professional's instructions.        Prevention Guidelines, Men Ages 50 to 64  Screening tests and vaccines are an important part of managing your health. A screening test is done to find possible disorders or diseases in people who don't have any symptoms. The goal is to find a disease early so lifestyle changes can be made and you can be watched more closely to reduce the risk of disease, or to detect it early enough to treat it most effectively. Screening tests are not considered diagnostic, but are used to determine if more testing is needed. Health counseling is essential, too. Below are guidelines for these, for men ages 50 to 64. Talk with your healthcare provider to make sure you re up-to-date on what you need.  Screening Who needs it How often   Alcohol misuse All men in this age group At routine exams   Blood pressure All men in this age group Yearly checkup if your blood pressure is normal  Normal blood pressure is less than 120/80 mm Hg  If your blood pressure reading is higher than normal, follow the advice of your healthcare  provider      Colorectal cancer All men in this age group Flexible sigmoidoscopy every 5 years, or colonoscopy every 10 years, or double-contrast barium enema every 5 years; yearly fecal occult blood test or fecal immunochemical test; or a stool DNA test as often as your healthcare provider advises; talk with your healthcare provider about which tests are best for you   Depression All men in this age group At routine exams   Type 2 diabetes or prediabetes All men beginning at age 45 and men without symptoms at any age who are overweight or obese and have 1 or more other risk factors for diabetes At least every 3 years (yearly if your blood sugar has already begun to rise)   Type 2 diabetes All men with prediabetes Every year   Hepatitis C Men at increased risk for infection - talk with your healthcare provider At routine exams. All men ages 50 to 70 should be tested at least once for hepatitis C.   High cholesterol or triglycerides All men in this age group At least every 5 years   HIV Men at increased risk for infection - talk with your healthcare provider At routine exams   Lung cancer Adults age 55 to 80 who have smoked Yearly screening in smokers with 30 pack-year history of smoking or who quit within 15 years   Obesity All men in this age group At routine exams   Prostate cancer Starting at age 45, talk to healthcare provider about risks and benefits of digital rectal exam (LAUREN) and prostate-specific antigen (PSA) screening1 At routine exams   Syphilis Men at increased risk for infection - talk with your healthcare provider At routine exams   Tuberculosis Men at increased risk for infection - talk with your healthcare provider Ask your healthcare provider   Vision All men in this age group Ask your healthcare provider   Vaccine Who needs it How often   Chickenpox (varicella) All men in this age group who have no record of this infection or vaccine 2 doses; second dose should be given at least 4 weeks after the  first dose   Hepatitis A Men at increased risk for infection - talk with your healthcare provider 2 doses given at least 6 months apart   Hepatitis B Men at increased risk for infection - talk with your healthcare provider 3 doses over 6 months; second dose should be given 1 month after the first dose; the third dose should be given at least 2 months after the second dose and at least 4 months after the first dose   Haemophilus influenzae Type B (HIB) Men at increased risk for infection - talk with your healthcare provider 1 to 3 doses   Influenza (flu) All men in this age group Once a year   Measles, mumps, rubella (MMR) Men in this age group through their late 50s who have no record of these infections or vaccines 1 or 2 doses; ask your healthcare provider   Meningococcal Men at increased risk for infection - talk with your healthcare provider 1 or more doses   Pneumococcal conjugate vaccine (PCV13) and pneumococcal polysaccharide vaccine (PPSV23) Men at increased risk for infection - talk with your healthcare provider PCV13: 1 dose ages 19 to 65 (protects against 13 types of pneumococcal bacteria)     PPSV23: 1 to 2 doses through age 64, or 1 dose at 65 or older (protects against 23 types of pneumococcal bacteria)      Tetanus/diphtheria/  pertussis (Td/Tdap) booster All men in this age group Td every 10 years, or a one-time dose of Tdap instead of a Td booster after age 18, then Td every 10 years   Zoster All men ages 60 and older 1 dose   Counseling Who needs it How often   Diet and exercise Men who are overweight or obese When diagnosed, and then at routine exams   Sexually transmitted infection prevention Men at increased risk for infection - talk with your healthcare provider At routine exams   Use of daily aspirin Men in this age group at risk for cardiovascular health problems At routine exams   Use of tobacco and the health effects it can cause All men in this age group Every visit   1National  [facility]  Date Last Reviewed: 2/1/2017 2000-2018 [employer]. [address] [place] [address], [place] [address]. All rights reserved. This information is not intended as a substitute for professional medical care. Always follow your healthcare professional's instructions.        Living with Osteoarthritis    Osteoarthritis is a chronic disease and the most common type of arthritis. But it doesn t have to keep you from leading an active life. You can help control symptoms by exercising and losing weight if you are overweight. Using special tools also helps make life easier. Be sure to see your healthcare provider for scheduled checkups and lab work. If you have questions or concerns between office visits, call your healthcare provider's office.  Make exercise part of your life  Gentle exercise can help lessen your pain. Keep the following in mind:    Choose exercises that improve joint motion and make your muscles stronger. Your healthcare provider or a physical therapist may suggest a few.    Stretching and flexibility activities such as yoga and lara chi may improve pain and joint motion.    Try low-impact sports, such as walking, biking, or doing exercises in a warm pool.    Most people should exercise for at least 30 minutes a day on most days of the week. This can be broken up into shorter periods throughout the day.    Don t push yourself too hard at first. Slowly build up over time.    Make sure you warm up for 5 to 10 minutes before you exercise.    If pain and stiffness increase, don't exercise as hard or as long.  Watch your weight  If you weigh more than you should, your weight-bearing joints are under extra pressure. This makes your symptoms worse. To reduce pain and stiffness, try losing a few of those extra pounds. The tips below may help:    Start a weight-loss program with the help of your healthcare provider.    Ask your friends and family for support.    Join a  weight-loss group.  Use special tools  Even simple tasks can be hard to do when your joints hurt. Special tools called assistive devices can make things easier by reducing strain and protecting your joints. Ask your healthcare provider where to find these and other helpful tools:    Long-handled reachers or grabbers    Jar openers and button threaders    Large  for pencils, garden tools, and other handheld objects  Use mobility and other aids  People with arthritis and other joint problems often use mobility aids to help with walking. For example, they may use canes or walkers. They may also use splints or braces to support joints. Talk with your healthcare provider or physical therapist about these aids:    A cane to reduce knee or hip pain and help prevent falls    Splints for your wrists or other joints    A brace to support a weak knee joint    Orthotics for toe and foot involvement  Medical and surgical treatments  Discuss medical treatments with your healthcare provider to help reduce your pain and improve joint mobility. Treatments may include:    Topical medicines such as lidocaine, capsaicin, and diclofenac gel    Oral medicines such as acetaminophen, NSAIDs (nonsteroidal anti-inflammatory drugs) such as ibuprofen and naproxen, or opioids    Injections in affected joints such as corticosteroids in various joints, or hyaluronic acid in the knee joints    Surgical repair or surgical joint replacement with artificial joints    Complementary therapies such as heat and cold treatment, massage, acupuncture, supplements, cognitive training, meditation, and others. Discuss these options with your healthcare provider.  Date Last Reviewed: 6/1/2018 2000-2018 [employer]. [address] [place] [address], [place] [address]. All rights reserved. This information is not intended as a substitute for professional medical care. Always follow your healthcare professional's instructions.        Osteoarthritis:  Common Sites  Osteoarthritis (OA) is sometimes called degenerative joint disease or wear-and-tear arthritis. It's the most common type of arthritis. In OA, the cartilage wears away. Cartilage covers the ends of bones and acts as a cushion. If enough cartilage wears away, bone rubs against bone. The joint changes in OA cause pain, stiffness, and trouble moving. OA may occur in any joint. Some joints that are commonly affected are the spine, neck,  hips, knees, fingers, and toes.  Neck    Joints between small bones in the neck may wear out. Pain may travel to the shoulder or the base of the skull.  Lumbar spine  Bony spurs may form on the joints between the vertebrae (spinal bones). And disks (cushions of cartilage between vertebrae) may wear down. Pain may affect the lower back or leg.  Hip  Cartilage damage can occur in the large  ball and socket  joint that connects the pelvis and thighbone (femur). Pain may travel to the groin, buttocks, or knee.  Knee  The cartilage in the knee joint may wear down. Weakness or instability in the knee joint may make walking or climbing stairs difficult.  Fingers  Finger joints may become enlarged and knobby. Grasping objects may be hard, especially if the joint at the base of the thumb is affected.  Toes  Toes may be affected. Arthritis may cause a bunion, a bump at the base of the big toe. Standing or walking may be painful.  Date Last Reviewed: 6/1/2018 2000-2018 [employer]. [address] [place] [address]. All rights reserved. This information is not intended as a substitute for professional medical care. Always follow your healthcare professional's instructions.        Osteoarthritis: Coping with Pain    There are many ways to control your pain. You re making a good start by learning about osteoarthritis and its treatments. Knowing more about this condition helps you work with your healthcare provider to find answers to problems. Keeping a positive  outlook can help you manage pain from day to day. And making time each day to relax and enjoy yourself may help you control osteoarthritis pain, instead of letting it control you. Try these methods to help you cope with, and even reduce, your pain.  Take control  Relaxing may help relieve muscle aches that result from joint pain. To relax, try these techniques:    Breathe slowly and calmly and think of a peaceful scene.    Meditate by focusing your mind on one word, object, or idea.  Getting plenty of sleep can help reduce pain and let you function better. If pain is making it hard for you to sleep, ask your healthcare provider about ways to control pain and ensure a good night s sleep. Cutting back on caffeine and alcohol can help you sleep better. So can going to bed and getting up at about the same time every day.  Use distraction  Getting your mind off the pain may seem hard to do. But it can actually help reduce pain. When you are in pain, try one of these ways of distracting yourself:    Watch a funny movie with a friend.    Listen to music you enjoy.    Read a novel.    Talk with friends or family.    Go to a museum, park, or other favorite attraction.    Arrange to do a regular activity, such as volunteer work.  Heat and cold  Using heat and cold treatments are simple ways to lessen arthritis symptoms:    Heat soothes stiff joints and tired muscles. Heat works well before exercise, for example. Heat treatments include:  ? A warm shower or bath, or soak (for example, fill the sink with warm water and move your fingers, hands, and wrists around in the water)  ? A moist heating pad  ? A warm, moist wash cloth  ? An electric blanket or throw    Cold treatments help to numb painful areas and decrease swelling. Cold treatments include the following wrapped in a thin towel:  ? An ice pack or bag of ice. To make an ice pack, put ice cubes in a plastic bag that seals at the top.  ? A gel-filled cold pack  Be careful  when using heat or cold. You can injure your skin. Each treatment should only last for 10 to 20 minutes. Your healthcare provider or therapist can give you specific instructions.  Acupuncture  Acupuncture is a 2,000-year-old practice. Providers insert thin needles in specific parts of the body. Research shows that it can help to relieve the pain of arthritis.  For more information or to find a provider in your area, contact the American Academy of Medical Acupuncture. Its website is: www.medicalacupuncture.org/.  Massage  Therapeutic massage has many benefits. It may:    Help you and your muscles relax    Improve blood flow to muscles and joints    Help joints stay more flexible  Look for a certified massage therapist. Many are trained to treat sore muscles and joint pain and stiffness.  Vitamins, supplements, and herbs  People with arthritis, or other long-term conditions that cause pain, often look for alternative ways to lessen pain. Vitamins, supplements, and herbs may or may not help you to feel better. Before you try any vitamin, supplement, or herb, make sure you ask your healthcare provider or pharmacist.  Physical therapy and occupational therapy  Evaluation by a physical therapist and or occupational therapist for assessment for limitations in activities of daily living  Help with developing an appropriate exercise routine for both muscle strengthening and cardiovascular health  Weight management  Studies have shown that weight loss in overweight people can improve osteoarthritis symptoms.  Talk with your healthcare provider about your optimal ideal weight and weight management methods if needed.  Psychological treatments  Research shows that many psychological therapies or those that deal with thinking and emotions, help people cope with arthritis pain. Therapies include cognitive behavioral therapy (CBT), pain coping skills training, biofeedback, stress management, and hypnosis. Ask your healthcare  provider for more information about these therapies.  For more information about many of these methods, contact the National Center for Complementary and Alternative Medicine (NCCAM) at  https://ncc.nih.gov.  Date Last Reviewed: 6/1/2018 2000-2018 [employer]. [address] [place] [address]. All rights reserved. This information is not intended as a substitute for professional medical care. Always follow your healthcare professional's instructions.        Osteoarthritis: Natural and Alternative Treatments     Therapeutic massage is one alternative treatment option.   The treatment for osteoarthritis includes lifestyle changes like weight loss and exercise. Medicines and surgery may also be part of the treatment. There are also many natural and alternative treatments. These treatments may also help relieve pain and stiffness caused by osteoarthritis.  Heat and cold  Using heat and cold treatments are simple ways to lessen arthritis symptoms:    Heat soothes stiff joints and tired muscles. Heat works well before exercise, for example. Heat treatments include:  ? A warm shower or bath, or soak (for example, fill the sink with warm water and move your fingers, hands, and wrists around in the water)  ? A moist heating pad  ? A warm, moist wash cloth  ? An electric blanket or throw    Cold treatments help to numb painful areas and decrease swelling. Cold treatments include the following wrapped in a thin towel:  ? An ice pack. To make an ice pack, put ice cubes in a plastic bag that seals at the top.  ? A gel-filled cold pack  Be careful when using heat or cold. You can injure your skin. Each treatment should only last for 10 to 20 minutes. Your healthcare provider or therapist can give you specific instructions.     Meditation and relaxation  Meditation and relaxation can help you deal with arthritis pain. There are many different methods available including deep breathing exercises,  meditation, and yoga. Look for information and programs online or in your community. Or try this simple deep breathing method sometimes called belly breathin. Sit in a comfortable chair or lie on your back.   2. Put one hand on your chest and the other hand on your stomach.  3. Take a breath in through your nose. The hand on your stomach should rise. The hand on your chest should move very little.  4. Breathe out through your mouth, pushing out as much air as you can. The hand on your stomach should move in as you breathe out, but the hand on your chest should move very little. You should feel the muscles of your stomach tighten.   5. Continue to breathe in through your nose and out through your mouth. You should feel your stomach rise and fall. Count slowly each time you breathe out.  Acupuncture  Acupuncture is a 2,000-year-old practice. Providers insert thin needles in specific parts of the body. Research shows that it can help to relieve the pain of arthritis.   For more information or to find a provider in your area, contact the American Academy of Medical Acupuncture at www.medicalacupuncture.org/.  Massage  Therapeutic massage has many benefits. It may:    Help you and your muscles relax    Improve blood flow to muscles and joints    Help joints stay more flexible.  Look for a certified massage therapist. Many are trained to treat sore muscles and joint pain and stiffness.  Vitamins, supplements, and herbs  People with arthritis, or other long-term conditions that cause pain, often look for alternative ways to lessen pain. Vitamins, supplements, and herbs may or may not help you to feel better. Before you try any vitamin, supplement, or herb, make sure you ask your healthcare provider or pharmacist.  Physical therapy and occupational therapy    Evaluation by a physical therapist and or occupational therapist for assessment for limitations in activities of daily living    Help with developing an  appropriate exercise routine for both muscle strengthening and cardiovascular health  Weight management    Studies have shown that weight loss in overweight people can improve osteoarthritis symptoms    Talk with your healthcare provider about your optimal ideal weight and weight management methods if needed.   Psychological treatments  Research shows that many psychological therapies or those that deal with thinking and emotions, help people cope with arthritis pain. Therapies include: cognitive behavioral therapy (CBT), pain coping skills training, biofeedback, stress management, and hypnosis. Ask your healthcare provider for more information about these therapies.  For more information about many of these methods, contact the National Center for Complementary and Alternative Medicine at  https://nccih.nih.gov.  Date Last Reviewed: 6/1/2018 2000-2018 [employer]. [address] [place] [address], [place] [address]. All rights reserved. This information is not intended as a substitute for professional medical care. Always follow your healthcare professional's instructions.

## 2018-11-27 NOTE — PROGRESS NOTES

## 2018-12-07 ENCOUNTER — TELEPHONE (OUTPATIENT)
Dept: FAMILY MEDICINE | Facility: OTHER | Age: 63
End: 2018-12-07

## 2018-12-07 NOTE — LETTER
[facility]  [id number] [place] [id number]  Phone: [phone]  December 11, 2018      Bryce Diaz  [address] [facility] [address]      Dear Bryce,    We care about your health and have reviewed your health plan including your medical conditions, medications, and lab results.  Based on this review, it is recommended that you follow up regarding the following health topic(s):  -Depression  -High Blood Pressure    We recommend you take the following action(s):  -schedule a FREE FLOAT MA-ONLY BLOOD PRESSURE APPOINTMENT within the next 1-4 weeks.  -schedule a WELLNESS (Physical) APPOINTMENT.  We will perform the following labs: BMP (basic metabolic panel).     Please call us at the [facility] - [phone] (or use [employer]) to address the above recommendations.     Thank you for trusting [facility] and we appreciate the opportunity to serve you.  We look forward to supporting your healthcare needs in the future.    Healthy Regards,    Your Health Care Team  [facility]

## 2018-12-07 NOTE — TELEPHONE ENCOUNTER
Panel Management Review      Patient has the following on his problem list:     Depression / Dysthymia review    Measure:  Needs PHQ-9 score of 4 or less during index window.  Administer PHQ-9 and if score is 5 or more, send encounter to provider for next steps.        PHQ-9 SCORE 9/25/2015 11/6/2015 4/29/2016   PHQ-9 Total Score - - -   PHQ-9 Total Score 13 13 17       If PHQ-9 recheck is 5 or more, route to provider for next steps.    Patient is due for:  PHQ9    Hypertension   Last three blood pressure readings:  BP Readings from Last 3 Encounters:   11/26/18 (!) 150/98   09/15/17 (!) 142/94   08/24/17 132/80     Blood pressure: FAILED    HTN Guidelines:  Age 18-59 BP range:  Less than 140/90  Age 60-85 with Diabetes:  Less than 140/90  Age 60-85 without Diabetes:  less than 150/90      Composite cancer screening  Chart review shows that this patient is due/due soon for the following None  Summary:    Patient is due/failing the following:   BP CHECK, PHQ9 and PHYSICAL    Action needed:   Patient needs office visit for physical. and Patient needs to do PHQ9.    Type of outreach:    Phone, left message for patient to call back.     Questions for provider review:    None                                                                                                                                    Kelly Rizzo MA        Chart routed to Care Team .

## 2018-12-10 NOTE — TELEPHONE ENCOUNTER
Attempted to contact patient, phone was answered then they hung up,   Try again later  Isabella Dudley RT (R)

## 2019-01-25 ENCOUNTER — TELEPHONE (OUTPATIENT)
Dept: FAMILY MEDICINE | Facility: OTHER | Age: 64
End: 2019-01-25

## 2019-01-25 NOTE — TELEPHONE ENCOUNTER
Panel Management Review      Patient has the following on his problem list:     Depression / Dysthymia review    Measure:  Needs PHQ-9 score of 4 or less during index window.  Administer PHQ-9 and if score is 5 or more, send encounter to provider for next steps.        PHQ-9 SCORE 9/25/2015 11/6/2015 4/29/2016   PHQ-9 Total Score - - -   PHQ-9 Total Score 13 13 17       If PHQ-9 recheck is 5 or more, route to provider for next steps.    Patient is due for:  PHQ9    Hypertension   Last three blood pressure readings:  BP Readings from Last 3 Encounters:   11/26/18 (!) 150/98   09/15/17 (!) 142/94   08/24/17 132/80     Blood pressure: FAILED    HTN Guidelines:  Age 18-59 BP range:  Less than 140/90  Age 60-85 with Diabetes:  Less than 140/90  Age 60-85 without Diabetes:  less than 150/90      Composite cancer screening  Chart review shows that this patient is due/due soon for the following None  Summary:    Patient is due/failing the following:   BMP, LDL and PHQ9    Action needed:   Patient needs office visit for blood pressure check. and Patient needs fasting lab only appointment and to complete a PHQ-9    Type of outreach:    Phone, left message for patient to call back.     Questions for provider review:    None                                                                                                                                    Kelly Rizzo MA        Chart routed to Care Team .

## 2019-01-25 NOTE — LETTER
[facility]  [address] [facility] [address]  Phone: [phone]  January 28, 2019      Bryce Diaz  [address] [facility] [address]      Dear Torito,    We care about your health and have reviewed your health plan including your medical conditions, medications, and lab results.  Based on this review, it is recommended that you follow up regarding the following health topic(s):  -High Blood Pressure  -Depression    We recommend you take the following action(s):  -schedule a FOLLOWUP OFFICE APPOINTMENT.  We will perform the following labs:  BMP (basic metabolic panel).     Please call us at the [facility] - [phone] (or use [employer]) to address the above recommendations.     Thank you for trusting [facility] and we appreciate the opportunity to serve you.  We look forward to supporting your healthcare needs in the future.    Healthy Regards,    Your Health Care Team  [facility]

## 2019-03-08 ENCOUNTER — TELEPHONE (OUTPATIENT)
Dept: FAMILY MEDICINE | Facility: OTHER | Age: 64
End: 2019-03-08

## 2019-03-08 NOTE — TELEPHONE ENCOUNTER
Panel Management Review      Patient has the following on his problem list:     Depression / Dysthymia review    Measure:  Needs PHQ-9 score of 4 or less during index window.  Administer PHQ-9 and if score is 5 or more, send encounter to provider for next steps.        PHQ-9 SCORE 9/25/2015 11/6/2015 4/29/2016   PHQ-9 Total Score - - -   PHQ-9 Total Score 13 13 17       If PHQ-9 recheck is 5 or more, route to provider for next steps.    Patient is due for:  PHQ9    Hypertension   Last three blood pressure readings:  BP Readings from Last 3 Encounters:   11/26/18 (!) 150/98   09/15/17 (!) 142/94   08/24/17 132/80     Blood pressure: FAILED    HTN Guidelines:  Age 18-59 BP range:  Less than 140/90  Age 60-85 with Diabetes:  Less than 140/90  Age 60-85 without Diabetes:  less than 150/90           Composite cancer screening  Chart review shows that this patient is due/due soon for the following None  Summary:    Patient is due/failing the following:   BMP, LDL and PHQ9    Action needed:   Patient needs office visit for physical. and Patient needs to do PHQ9. Patient needs fasting labs. Patient needs a blood pressure check.     Type of outreach:    Phone, left message for patient to call back.     Questions for provider review:    None                                                                                                                                    Kelly Rizzo MA        Chart routed to Care Team .

## 2019-03-20 ENCOUNTER — OFFICE VISIT (OUTPATIENT)
Dept: FAMILY MEDICINE | Facility: CLINIC | Age: 64
End: 2019-03-20
Payer: COMMERCIAL

## 2019-03-20 VITALS
OXYGEN SATURATION: 99 % | WEIGHT: 173 LBS | HEIGHT: 67 IN | BODY MASS INDEX: 27.15 KG/M2 | DIASTOLIC BLOOD PRESSURE: 98 MMHG | TEMPERATURE: 97.3 F | RESPIRATION RATE: 14 BRPM | SYSTOLIC BLOOD PRESSURE: 146 MMHG | HEART RATE: 72 BPM

## 2019-03-20 DIAGNOSIS — Z11.59 NEED FOR HEPATITIS C SCREENING TEST: ICD-10-CM

## 2019-03-20 DIAGNOSIS — K21.00 GASTROESOPHAGEAL REFLUX DISEASE WITH ESOPHAGITIS: ICD-10-CM

## 2019-03-20 DIAGNOSIS — I10 HYPERTENSION GOAL BP (BLOOD PRESSURE) < 140/90: ICD-10-CM

## 2019-03-20 DIAGNOSIS — Z11.4 SCREENING FOR HIV (HUMAN IMMUNODEFICIENCY VIRUS): ICD-10-CM

## 2019-03-20 DIAGNOSIS — Z00.01 ENCOUNTER FOR ROUTINE ADULT PHYSICAL EXAM WITH ABNORMAL FINDINGS: Primary | ICD-10-CM

## 2019-03-20 DIAGNOSIS — Z83.3 FAMILY HISTORY OF DIABETES MELLITUS: ICD-10-CM

## 2019-03-20 DIAGNOSIS — G47.00 PERSISTENT INSOMNIA: ICD-10-CM

## 2019-03-20 LAB
ALBUMIN SERPL-MCNC: 3.7 G/DL (ref 3.4–5)
ALP SERPL-CCNC: 76 U/L (ref 40–150)
ALT SERPL W P-5'-P-CCNC: 26 U/L (ref 0–70)
ANION GAP SERPL CALCULATED.3IONS-SCNC: 3 MMOL/L (ref 3–14)
AST SERPL W P-5'-P-CCNC: 19 U/L (ref 0–45)
BASOPHILS # BLD AUTO: 0 10E9/L (ref 0–0.2)
BASOPHILS NFR BLD AUTO: 0.6 %
BILIRUB SERPL-MCNC: 0.4 MG/DL (ref 0.2–1.3)
BUN SERPL-MCNC: 21 MG/DL (ref 7–30)
CALCIUM SERPL-MCNC: 8.9 MG/DL (ref 8.5–10.1)
CHLORIDE SERPL-SCNC: 107 MMOL/L (ref 94–109)
CHOLEST SERPL-MCNC: 222 MG/DL
CO2 SERPL-SCNC: 30 MMOL/L (ref 20–32)
CREAT SERPL-MCNC: 0.78 MG/DL (ref 0.66–1.25)
DIFFERENTIAL METHOD BLD: NORMAL
EOSINOPHIL # BLD AUTO: 0.3 10E9/L (ref 0–0.7)
EOSINOPHIL NFR BLD AUTO: 4.9 %
ERYTHROCYTE [DISTWIDTH] IN BLOOD BY AUTOMATED COUNT: 13.6 % (ref 10–15)
GFR SERPL CREATININE-BSD FRML MDRD: >90 ML/MIN/{1.73_M2}
GLUCOSE SERPL-MCNC: 90 MG/DL (ref 70–99)
HBA1C MFR BLD: 5.2 % (ref 0–5.6)
HCT VFR BLD AUTO: 40.6 % (ref 40–53)
HDLC SERPL-MCNC: 50 MG/DL
HGB BLD-MCNC: 13.6 G/DL (ref 13.3–17.7)
LDLC SERPL CALC-MCNC: 149 MG/DL
LYMPHOCYTES # BLD AUTO: 2.1 10E9/L (ref 0.8–5.3)
LYMPHOCYTES NFR BLD AUTO: 29.8 %
MCH RBC QN AUTO: 30.7 PG (ref 26.5–33)
MCHC RBC AUTO-ENTMCNC: 33.5 G/DL (ref 31.5–36.5)
MCV RBC AUTO: 92 FL (ref 78–100)
MONOCYTES # BLD AUTO: 0.9 10E9/L (ref 0–1.3)
MONOCYTES NFR BLD AUTO: 13.2 %
NEUTROPHILS # BLD AUTO: 3.5 10E9/L (ref 1.6–8.3)
NEUTROPHILS NFR BLD AUTO: 51.5 %
NONHDLC SERPL-MCNC: 172 MG/DL
PLATELET # BLD AUTO: 230 10E9/L (ref 150–450)
POTASSIUM SERPL-SCNC: 4.5 MMOL/L (ref 3.4–5.3)
PROT SERPL-MCNC: 7.2 G/DL (ref 6.8–8.8)
RBC # BLD AUTO: 4.43 10E12/L (ref 4.4–5.9)
SODIUM SERPL-SCNC: 140 MMOL/L (ref 133–144)
TRIGL SERPL-MCNC: 113 MG/DL
TSH SERPL DL<=0.005 MIU/L-ACNC: 1.41 MU/L (ref 0.4–4)
WBC # BLD AUTO: 6.9 10E9/L (ref 4–11)

## 2019-03-20 PROCEDURE — 83036 HEMOGLOBIN GLYCOSYLATED A1C: CPT | Performed by: NURSE PRACTITIONER

## 2019-03-20 PROCEDURE — 80061 LIPID PANEL: CPT | Performed by: NURSE PRACTITIONER

## 2019-03-20 PROCEDURE — 99396 PREV VISIT EST AGE 40-64: CPT | Performed by: NURSE PRACTITIONER

## 2019-03-20 PROCEDURE — 85025 COMPLETE CBC W/AUTO DIFF WBC: CPT | Performed by: NURSE PRACTITIONER

## 2019-03-20 PROCEDURE — 80053 COMPREHEN METABOLIC PANEL: CPT | Performed by: NURSE PRACTITIONER

## 2019-03-20 PROCEDURE — 99214 OFFICE O/P EST MOD 30 MIN: CPT | Mod: 25 | Performed by: NURSE PRACTITIONER

## 2019-03-20 PROCEDURE — 36415 COLL VENOUS BLD VENIPUNCTURE: CPT | Performed by: NURSE PRACTITIONER

## 2019-03-20 PROCEDURE — 84443 ASSAY THYROID STIM HORMONE: CPT | Performed by: NURSE PRACTITIONER

## 2019-03-20 RX ORDER — HYDROXYZINE PAMOATE 25 MG/1
25-50 CAPSULE ORAL
Qty: 60 CAPSULE | Refills: 5 | Status: SHIPPED | OUTPATIENT
Start: 2019-03-20 | End: 2019-03-20

## 2019-03-20 RX ORDER — HYDROXYZINE HYDROCHLORIDE 25 MG/1
25-50 TABLET, FILM COATED ORAL
Qty: 60 TABLET | Refills: 5 | Status: SHIPPED | OUTPATIENT
Start: 2019-03-20 | End: 2021-03-04

## 2019-03-20 RX ORDER — LOSARTAN POTASSIUM 100 MG/1
100 TABLET ORAL DAILY
Qty: 90 TABLET | Refills: 3 | Status: SHIPPED | OUTPATIENT
Start: 2019-03-20 | End: 2019-06-26

## 2019-03-20 RX ORDER — LOSARTAN POTASSIUM 50 MG/1
50 TABLET ORAL DAILY
Qty: 90 TABLET | Refills: 1 | Status: CANCELLED | OUTPATIENT
Start: 2019-03-20

## 2019-03-20 RX ORDER — CARVEDILOL 6.25 MG/1
6.25 TABLET ORAL 2 TIMES DAILY
Qty: 180 TABLET | Refills: 3 | Status: SHIPPED | OUTPATIENT
Start: 2019-03-20 | End: 2020-09-15

## 2019-03-20 ASSESSMENT — ENCOUNTER SYMPTOMS
WEAKNESS: 1
DIZZINESS: 0
NAUSEA: 0
ABDOMINAL PAIN: 0
PARESTHESIAS: 0
SHORTNESS OF BREATH: 0
CONSTIPATION: 0
NERVOUS/ANXIOUS: 0
EYE PAIN: 0
HEMATURIA: 0
FREQUENCY: 0
PALPITATIONS: 0
HEARTBURN: 1
CHILLS: 0
SORE THROAT: 0
MYALGIAS: 1
ARTHRALGIAS: 1
COUGH: 0
DIARRHEA: 0
DYSURIA: 0
HEMATOCHEZIA: 0
JOINT SWELLING: 1
HEADACHES: 1
FEVER: 0

## 2019-03-20 ASSESSMENT — ANXIETY QUESTIONNAIRES
GAD7 TOTAL SCORE: 4
5. BEING SO RESTLESS THAT IT IS HARD TO SIT STILL: NOT AT ALL
7. FEELING AFRAID AS IF SOMETHING AWFUL MIGHT HAPPEN: NOT AT ALL
2. NOT BEING ABLE TO STOP OR CONTROL WORRYING: SEVERAL DAYS
GAD7 TOTAL SCORE: 4
6. BECOMING EASILY ANNOYED OR IRRITABLE: SEVERAL DAYS
4. TROUBLE RELAXING: MORE THAN HALF THE DAYS
7. FEELING AFRAID AS IF SOMETHING AWFUL MIGHT HAPPEN: NOT AT ALL
1. FEELING NERVOUS, ANXIOUS, OR ON EDGE: NOT AT ALL
3. WORRYING TOO MUCH ABOUT DIFFERENT THINGS: NOT AT ALL
GAD7 TOTAL SCORE: 4

## 2019-03-20 ASSESSMENT — PAIN SCALES - GENERAL: PAINLEVEL: NO PAIN (0)

## 2019-03-20 ASSESSMENT — PATIENT HEALTH QUESTIONNAIRE - PHQ9
SUM OF ALL RESPONSES TO PHQ QUESTIONS 1-9: 8
SUM OF ALL RESPONSES TO PHQ QUESTIONS 1-9: 8
10. IF YOU CHECKED OFF ANY PROBLEMS, HOW DIFFICULT HAVE THESE PROBLEMS MADE IT FOR YOU TO DO YOUR WORK, TAKE CARE OF THINGS AT HOME, OR GET ALONG WITH OTHER PEOPLE: SOMEWHAT DIFFICULT

## 2019-03-20 ASSESSMENT — MIFFLIN-ST. JEOR: SCORE: 1548.35

## 2019-03-20 NOTE — PATIENT INSTRUCTIONS
Preventive Health Recommendations  Male Ages 50 - 64    Yearly exam:             See your health care provider every year in order to  o   Review health changes.   o   Discuss preventive care.    o   Review your medicines if your doctor has prescribed any.     Have a cholesterol test every 5 years, or more frequently if you are at risk for high cholesterol/heart disease.     Have a diabetes test (fasting glucose) every three years. If you are at risk for diabetes, you should have this test more often.     Have a colonoscopy at age 50, or have a yearly FIT test (stool test). These exams will check for colon cancer.      Talk with your health care provider about whether or not a prostate cancer screening test (PSA) is right for you.    You should be tested each year for STDs (sexually transmitted diseases), if you re at risk.     Shots: Get a flu shot each year. Get a tetanus shot every 10 years.     Nutrition:    Eat at least 5 servings of fruits and vegetables daily.     Eat whole-grain bread, whole-wheat pasta and brown rice instead of white grains and rice.     Get adequate Calcium and Vitamin D.     Lifestyle    Exercise for at least 150 minutes a week (30 minutes a day, 5 days a week). This will help you control your weight and prevent disease.     Limit alcohol to one drink per day.     No smoking.     Wear sunscreen to prevent skin cancer.     See your dentist every six months for an exam and cleaning.     See your eye doctor every 1 to 2 years.        Patient Education     Established High Blood Pressure    High blood pressure (hypertension) is a chronic disease. Often, healthcare providers don t know what causes it. But it can be caused by certain health conditions and medicines.  If you have high blood pressure, you may not have any symptoms. If you do have symptoms, they may include headache, dizziness, changes in your vision, chest pain, and shortness of breath. But even without symptoms, high blood  pressure that s not treated raises your risk for heart attack, heart failure, and stroke. High blood pressure is a serious health risk and shouldn t be ignored.  Blood pressure measurements are given as 2 numbers. Systolic blood pressure is the upper number. This is the pressure when the heart contracts. Diastolic blood pressure is the lower number. This is the pressure when the heart relaxes between beats. You will see your blood pressure readings written together. For example, a person with a systolic pressure of 118 and a diastolic pressure of 78 will have 118/78 written in the medical record.  Blood pressure is categorized as normal, elevated, or stage 1 or stage 2 high blood pressure:    Normal blood pressure is systolic of less than 120 and diastolic of less than 80 (120/80)    Elevated blood pressure is systolic of 120 to 129 and diastolic less than 80    Stage 1 high blood pressure is systolic is 130 to 139 or diastolic between 80 to 89    Stage 2 high blood pressure is when systolic is 140 or higher or the diastolic is 90 or higher  Home care  If you have high blood pressure, follow these home care guidelines to help lower your blood pressure. If you are taking medicines for high blood pressure, these methods may reduce or end your need for medicines in the future.    Start a weight-loss program if you are overweight.    Cut back on how much salt you get in your diet. Here s how to do this:  ? Don t eat foods that have a lot of salt. These include olives, pickles, smoked meats, and salted potato chips.  ? Don t add salt to your food at the table.  ? Use only small amounts of salt when cooking.    Start an exercise program. Talk with your healthcare provider about the type of exercise program that would be best for you. It doesn't have to be hard. Even brisk walking for 20 minutes 3 times a week is a good form of exercise.    Don t take medicines that stimulate the heart. This includes many over-the-counter  cold and sinus decongestant pills and sprays, as well as diet pills. Check the warnings about high blood pressure on the label. Before buying any over-the-counter medicines or supplements, always ask the pharmacist about the product's potential interaction with your high blood pressure and your high blood pressure medicines.    Stimulants such as amphetamine or cocaine could be deadly for someone with high blood pressure. Never take these.    Limit how much caffeine you get in your diet. Switch to caffeine-free products.    Stop smoking. If you are a long-time smoker, this can be hard. Talk to your healthcare provider about medicines and nicotine replacement options to help you. Also, enroll in a stop-smoking program to make it more likely that you will quit for good.    Learn how to handle stress. This is an important part of any program to lower blood pressure. Learn about relaxation methods like meditation, yoga, or biofeedback.    If your provider prescribed medicines, take them exactly as directed. Missing doses may cause your blood pressure get out of control.    If you miss a dose or doses, check with your healthcare provider or pharmacist about what to do.    Consider buying an automatic blood pressure machine to check your blood pressure at home. Ask your provider for a recommendation. You can get one of these at most pharmacies.     The American Heart Association recommends the following guidelines for home blood pressure monitoring:    Don't smoke or drink coffee for 30 minutes before taking your blood pressure.    Go to the bathroom before the test.    Relax for 5 minutes before taking the measurement.    Sit with your back supported (don't sit on a couch or soft chair); keep your feet on the floor uncrossed. Place your arm on a solid flat surface (like a table) with the upper part of the arm at heart level. Place the middle of the cuff directly above the bend of the elbow. Check the monitor's  instruction manual for an illustration.    Take multiple readings. When you measure, take 2 to 3 readings one minute apart and record all of the results.    Take your blood pressure at the same time every day, or as your healthcare provider recommends.    Record the date, time, and blood pressure reading.    Take the record with you to your next medical appointment. If your blood pressure monitor has a built-in memory, simply take the monitor with you to your next appointment.    Call your provider if you have several high readings. Don't be frightened by a single high blood pressure reading, but if you get several high readings, check in with your healthcare provider.    Note: When blood pressure reaches a systolic (top number) of 180 or higher OR diastolic (bottom number) of 110 or higher, seek emergency medical treatment.  Follow-up care  You will need to see your healthcare provider regularly. This is to check your blood pressure and to make changes to your medicines. Make a follow-up appointment as directed. Bring the record of your home blood pressure readings to the appointment.  When to seek medical advice  Call your healthcare provider right away if any of these occur:    Blood pressure reaches a systolic (upper number) of 180 or higher OR a diastolic (bottom number) of 110 or higher    Chest pain or shortness of breath    Severe headache    Throbbing or rushing sound in the ears    Nosebleed    Sudden severe pain in your belly (abdomen)    Extreme drowsiness, confusion, or fainting    Dizziness or spinning sensation (vertigo)    Weakness of an arm or leg or one side of the face    You have problems speaking or seeing   Date Last Reviewed: 12/1/2016 2000-2018 [employer]. [address] [place] [address], [place] [address]. All rights reserved. This information is not intended as a substitute for professional medical care. Always follow your healthcare professional's instructions.           Patient  Education     Osteoarthritis: Coping with Pain    There are many ways to control your pain. You re making a good start by learning about osteoarthritis and its treatments. Knowing more about this condition helps you work with your healthcare provider to find answers to problems. Keeping a positive outlook can help you manage pain from day to day. And making time each day to relax and enjoy yourself may help you control osteoarthritis pain, instead of letting it control you. Try these methods to help you cope with, and even reduce, your pain.  Take control  Relaxing may help relieve muscle aches that result from joint pain. To relax, try these techniques:    Breathe slowly and calmly and think of a peaceful scene.    Meditate by focusing your mind on one word, object, or idea.  Getting plenty of sleep can help reduce pain and let you function better. If pain is making it hard for you to sleep, ask your healthcare provider about ways to control pain and ensure a good night s sleep. Cutting back on caffeine and alcohol can help you sleep better. So can going to bed and getting up at about the same time every day.  Use distraction  Getting your mind off the pain may seem hard to do. But it can actually help reduce pain. When you are in pain, try one of these ways of distracting yourself:    Watch a funny movie with a friend.    Listen to music you enjoy.    Read a novel.    Talk with friends or family.    Go to a museum, park, or other favorite attraction.    Arrange to do a regular activity, such as volunteer work.  Heat and cold  Using heat and cold treatments are simple ways to lessen arthritis symptoms:    Heat soothes stiff joints and tired muscles. Heat works well before exercise, for example. Heat treatments include:  ? A warm shower or bath, or soak (for example, fill the sink with warm water and move your fingers, hands, and wrists around in the water)  ? A moist heating pad  ? A warm, moist wash cloth  ? An  electric blanket or throw    Cold treatments help to numb painful areas and decrease swelling. Cold treatments include the following wrapped in a thin towel:  ? An ice pack or bag of ice. To make an ice pack, put ice cubes in a plastic bag that seals at the top.  ? A gel-filled cold pack  Be careful when using heat or cold. You can injure your skin. Each treatment should only last for 10 to 20 minutes. Your healthcare provider or therapist can give you specific instructions.  Acupuncture  Acupuncture is a 2,000-year-old practice. Providers insert thin needles in specific parts of the body. Research shows that it can help to relieve the pain of arthritis.  For more information or to find a provider in your area, contact the American Academy of Medical Acupuncture. Its website is: www.medicalacupuncture.org/.  Massage  Therapeutic massage has many benefits. It may:    Help you and your muscles relax    Improve blood flow to muscles and joints    Help joints stay more flexible  Look for a certified massage therapist. Many are trained to treat sore muscles and joint pain and stiffness.  Vitamins, supplements, and herbs  People with arthritis, or other long-term conditions that cause pain, often look for alternative ways to lessen pain. Vitamins, supplements, and herbs may or may not help you to feel better. Before you try any vitamin, supplement, or herb, make sure you ask your healthcare provider or pharmacist.  Physical therapy and occupational therapy  Evaluation by a physical therapist and or occupational therapist for assessment for limitations in activities of daily living  Help with developing an appropriate exercise routine for both muscle strengthening and cardiovascular health  Weight management  Studies have shown that weight loss in overweight people can improve osteoarthritis symptoms.  Talk with your healthcare provider about your optimal ideal weight and weight management methods if needed.  Psychological  treatments  Research shows that many psychological therapies or those that deal with thinking and emotions, help people cope with arthritis pain. Therapies include cognitive behavioral therapy (CBT), pain coping skills training, biofeedback, stress management, and hypnosis. Ask your healthcare provider for more information about these therapies.  For more information about many of these methods, contact the National Center for Complementary and Alternative Medicine (NCCAM) at  https://ncc.nih.gov.  Date Last Reviewed: 6/1/2018 2000-2018 [employer]. [address] [place] [id number]. All rights reserved. This information is not intended as a substitute for professional medical care. Always follow your healthcare professional's instructions.           Patient Education     Living with Osteoarthritis    Osteoarthritis is a chronic disease and the most common type of arthritis. But it doesn t have to keep you from leading an active life. You can help control symptoms by exercising and losing weight if you are overweight. Using special tools also helps make life easier. Be sure to see your healthcare provider for scheduled checkups and lab work. If you have questions or concerns between office visits, call your healthcare provider's office.  Make exercise part of your life  Gentle exercise can help lessen your pain. Keep the following in mind:    Choose exercises that improve joint motion and make your muscles stronger. Your healthcare provider or a physical therapist may suggest a few.    Stretching and flexibility activities such as yoga and lara chi may improve pain and joint motion.    Try low-impact sports, such as walking, biking, or doing exercises in a warm pool.    Most people should exercise for at least 30 minutes a day on most days of the week. This can be broken up into shorter periods throughout the day.    Don t push yourself too hard at first. Slowly build up over time.    Make  sure you warm up for 5 to 10 minutes before you exercise.    If pain and stiffness increase, don't exercise as hard or as long.  Watch your weight  If you weigh more than you should, your weight-bearing joints are under extra pressure. This makes your symptoms worse. To reduce pain and stiffness, try losing a few of those extra pounds. The tips below may help:    Start a weight-loss program with the help of your healthcare provider.    Ask your friends and family for support.    Join a weight-loss group.  Use special tools  Even simple tasks can be hard to do when your joints hurt. Special tools called assistive devices can make things easier by reducing strain and protecting your joints. Ask your healthcare provider where to find these and other helpful tools:    Long-handled reachers or grabbers    Jar openers and button threaders    Large  for pencils, garden tools, and other handheld objects  Use mobility and other aids  People with arthritis and other joint problems often use mobility aids to help with walking. For example, they may use canes or walkers. They may also use splints or braces to support joints. Talk with your healthcare provider or physical therapist about these aids:    A cane to reduce knee or hip pain and help prevent falls    Splints for your wrists or other joints    A brace to support a weak knee joint    Orthotics for toe and foot involvement  Medical and surgical treatments  Discuss medical treatments with your healthcare provider to help reduce your pain and improve joint mobility. Treatments may include:    Topical medicines such as lidocaine, capsaicin, and diclofenac gel    Oral medicines such as acetaminophen, NSAIDs (nonsteroidal anti-inflammatory drugs) such as ibuprofen and naproxen, or opioids    Injections in affected joints such as corticosteroids in various joints, or hyaluronic acid in the knee joints    Surgical repair or surgical joint replacement with artificial  joints    Complementary therapies such as heat and cold treatment, massage, acupuncture, supplements, cognitive training, meditation, and others. Discuss these options with your healthcare provider.  Date Last Reviewed: 6/1/2018 2000-2018 [employer]. [address] [place] [address], [place] [address]. All rights reserved. This information is not intended as a substitute for professional medical care. Always follow your healthcare professional's instructions.           Patient Education     Understanding Carpometacarpal Osteoarthritis    The base of the thumb where it meets the hand is called the carpometacarpal (CMC) joint. This joint allows the thumb to move freely in many directions. It also provides strength so the hand can grasp and .  A smooth tissue called cartilage lines and cushions the bones of the CMC joint. Using the thumb puts stress on the joint. Over time, this can lead to the breakdown of the cartilage in the joint. This is known as osteoarthritis. With this condition, bones of the joint may be exposed and rub together. They may become irritated and rough. This keeps the joint from moving smoothly and can lead to pain.     How to say it  RADHA-deepti-met--RADHA-carolyne      What causes CMC joint osteoarthritis?    This type of osteoarthritis is mainly caused by years of using the hand and thumb. The condition may be more likely if you:    Regularly do things that put great stress on the thumb joint    Have had previous thumb injuries    Have weakened or loose structures in the thumb    Are a woman who is past menopause  Symptoms of CMC joint osteoarthritis  Symptoms commonly include:    Thumb pain. It may get worse with pinching or gripping.    Thumb weakness    Sounds of grinding or popping in the thumb joint    Base of the thumb is enlarged   Treatment for CMC joint osteoarthritis  Osteoarthritis is a long-term (chronic) condition. Treatment focuses on managing symptoms. It may  include:    Taking prescription or over-the-counter pain medicines to help reduce pain and swelling    Using a brace to rest and support the thumb joint    Using hot or cold therapy to help relieve pain    Learning and practicing ways to reduce stress on the thumb joint    Using devices that help protect the joint. These include jar openers, pen , and spring-action scissors.    Following a plan of physical therapy and exercises. This will help improve the flexibility and strength of the hand and thumb.    Getting shots of medicine into the joint to help relieve symptoms for a time  If these treatments don t do enough to relieve severe pain, you may need surgery. There are several different types of surgery. In general, the goal is to relieve pain and help you to be able to use the hand.     When to call your healthcare provider  Call your healthcare provider right away if you have any of these:    Fever of 100.4 F (38 C) or higher, or as directed    Symptoms that don t get better, or get worse    New symptoms   Date Last Reviewed: 3/10/2016    4925-0226 [employer]. [address] [place] [address]. All rights reserved. This information is not intended as a substitute for professional medical care. Always follow your healthcare professional's instructions.           Patient Education     Tips to Control Acid Reflux    To control acid reflux, you ll need to make some basic diet and lifestyle changes. The simple steps outlined below may be all you ll need to ease discomfort.  Watch what you eat    Avoid fatty foods and spicy foods.    Eat fewer acidic foods, such as citrus and tomato-based foods. These can increase symptoms.    Limit drinking alcohol, caffeine, and fizzy beverages. All increase acid reflux.    Try limiting chocolate, peppermint, and spearmint. These can worsen acid reflux in some people.  Watch when you eat    Avoid lying down for 3 hours after eating.    Do not snack  before going to bed.  Raise your head  Raising your head and upper body by 4 to 6 inches helps limit reflux when you re lying down. Put blocks under the head of your bed frame to raise it.  Other changes    Lose weight, if you need to    Don t exercise near bedtime    Avoid tight-fitting clothes    Limit aspirin and ibuprofen    Stop smoking   Date Last Reviewed: 7/1/2016 2000-2018 [employer]. [address] [place] [address], [place] [id number]. All rights reserved. This information is not intended as a substitute for professional medical care. Always follow your healthcare professional's instructions.

## 2019-03-20 NOTE — PROGRESS NOTES
SUBJECTIVE:   CC: Bryce Diaz is an 63 year old male who presents for preventative health visit.     Physical   Annual:     Getting at least 3 servings of Calcium per day:  Yes    Bi-annual eye exam:  NO    Dental care twice a year:  NO    Sleep apnea or symptoms of sleep apnea:  Daytime drowsiness    Diet:  Regular (no restrictions)    Frequency of exercise:  None    Taking medications regularly:  No    Barriers to taking medications:  Problems remembering to take them    Additional concerns today:  No    PHQ-2 Total Score: 1      Hypertension Follow-up      Outpatient blood pressures are not being checked. HOME    Low Salt Diet: no added salt  BP Readings from Last 3 Encounters:   03/20/19 (!) 146/98   11/26/18 (!) 150/98   09/15/17 (!) 142/94     Tolerating this particular medication without difficulty.    Today's PHQ-2 Score:   PHQ-2 ( 1999 Pfizer) 3/20/2019   Q1: Little interest or pleasure in doing things 1   Q2: Feeling down, depressed or hopeless 0   PHQ-2 Score 1   Q1: Little interest or pleasure in doing things Several days   Q2: Feeling down, depressed or hopeless Not at all   PHQ-2 Score 1       Abuse: Current or Past(Physical, Sexual or Emotional)- No  Do you feel safe in your environment? Yes    Social History     Tobacco Use     Smoking status: Never Smoker     Smokeless tobacco: Never Used     Tobacco comment: no smoking in the home   Substance Use Topics     Alcohol use: Yes     Comment: rare     Alcohol Use 3/20/2019   If you drink alcohol do you typically have greater than 3 drinks per day OR greater than 7 drinks per week? No   No flowsheet data found.    Last PSA:   PSA   Date Value Ref Range Status   10/17/2008 1.73 0 - 4 ug/L Final     And some acid reflux.  Persistent insomnia  Family history of type 2 diabetes mellitus    Reviewed orders with patient. Reviewed health maintenance and updated orders accordingly - Yes  Patient Active Problem List   Diagnosis     Esophageal reflux      Attention deficit hyperactivity disorder (ADHD)     Myalgia and myositis     CARDIOVASCULAR SCREENING; LDL GOAL LESS THAN 130     Hypertension goal BP (blood pressure) < 140/90     Intervertebral lumbar disc disorder with myelopathy, lumbar region     Low back pain radiating to both legs     Advanced directives, counseling/discussion     Sciatic nerve pain     Mild major depression (H)     BPH (benign prostatic hyperplasia)     Lumbar stenosis     Past Surgical History:   Procedure Laterality Date     C APPENDECTOMY      5 years of age     C NONSPECIFIC PROCEDURE  1983    neck fusion     COLONOSCOPY N/A 9/23/2015    Procedure: COLONOSCOPY;  Surgeon: Juan Francisco Cornejo MD;  Location: [facility]     HC RECONSTRUCT TENDON GOLDEN EA, W LOCAL TISSUES  08/07/1998    A-1 pulley release, right fourth finger     HC UGI ENDOSCOPY DIAG W OR W/O BRUSH/WASH  2002    bx. pending  - use proton pump inhibitor indefinitely     HEMILAMINECTOMY, DISCECTOMY LUMBAR TWO LEVELS, COMBINED Right 10/21/2015    Procedure: COMBINED HEMILAMINECTOMY, DISCECTOMY LUMBAR TWO LEVELS;  Surgeon: Jesus Ornelas MD;  Location: [facility]     INJECT EPIDURAL LUMBAR  11/14/13,2/10/14    [facility]     INJECT EPIDURAL LUMBAR  5/13/14,8/5/14    [facility]     INJECT EPIDURAL LUMBAR  12/15/14    [facility]       Social History     Tobacco Use     Smoking status: Never Smoker     Smokeless tobacco: Never Used     Tobacco comment: no smoking in the home   Substance Use Topics     Alcohol use: Yes     Comment: rare     Family History   Problem Relation Age of Onset     Cancer Mother      Cancer Father      Cancer Brother      Cancer Sister      C.A.D. No family hx of          Recent Labs   Lab Test 03/20/19  0927 07/19/17  1800 07/28/16  1752  02/04/14  1148   A1C 5.2  --   --   --   --    *  --   --   --  119   HDL 50  --   --   --  43   TRIG 113  --   --   --  120   ALT 26 23 23  --   --    CR 0.78 1.00 0.66   < > 0.88   GFRESTIMATED  >90 76 >90  Non  GFR Calc     < > 90   GFRESTBLACK >90 >90   GFR Calc   >90   GFR Calc     < > >90   POTASSIUM 4.5 4.0 3.9   < > 4.2   TSH 1.41  --   --   --   --     < > = values in this interval not displayed.        Reviewed and updated as needed this visit by clinical staff  Tobacco  Allergies  Meds  Med Hx  Surg Hx  Fam Hx  Soc Hx        Reviewed and updated as needed this visit by Provider            Review of Systems   Constitutional: Negative for chills and fever.   HENT: Positive for hearing loss. Negative for congestion, ear pain and sore throat.    Eyes: Positive for visual disturbance. Negative for pain.   Respiratory: Negative for cough and shortness of breath.    Cardiovascular: Negative for chest pain, palpitations and peripheral edema.   Gastrointestinal: Positive for heartburn. Negative for abdominal pain, constipation, diarrhea, hematochezia and nausea.   Genitourinary: Negative for discharge, dysuria, frequency, genital sores, hematuria, impotence and urgency.   Musculoskeletal: Positive for arthralgias, joint swelling and myalgias.   Skin: Negative for rash.   Neurological: Positive for weakness and headaches. Negative for dizziness and paresthesias.   Psychiatric/Behavioral: Negative for mood changes. The patient is not nervous/anxious.          OBJECTIVE:   There were no vitals taken for this visit.    Physical Exam  GENERAL: healthy, alert and no distress  EYES: Eyes grossly normal to inspection, PERRL and conjunctivae and sclerae normal  HENT: ear canals and TM's normal, nose and mouth without ulcers or lesions  NECK: no adenopathy, no asymmetry, masses, or scars and thyroid normal to palpation  RESP: lungs clear to auscultation - no rales, rhonchi or wheezes  CV: regular rate and rhythm, normal S1 S2, no S3 or S4, no murmur, click or rub, no peripheral edema and peripheral pulses strong  ABDOMEN: soft, nontender, no hepatosplenomegaly, no  masses and bowel sounds normal  MS: no gross musculoskeletal defects noted, no edema  SKIN: no suspicious lesions or rashes  NEURO: Normal strength and tone, mentation intact and speech normal  PSYCH: mentation appears normal, affect normal/bright    Results for orders placed or performed in visit on 03/20/19   Lipid panel reflex to direct LDL Fasting   Result Value Ref Range    Cholesterol 222 (H) <200 mg/dL    Triglycerides 113 <150 mg/dL    HDL Cholesterol 50 >39 mg/dL    LDL Cholesterol Calculated 149 (H) <100 mg/dL    Non HDL Cholesterol 172 (H) <130 mg/dL   Comprehensive metabolic panel   Result Value Ref Range    Sodium 140 133 - 144 mmol/L    Potassium 4.5 3.4 - 5.3 mmol/L    Chloride 107 94 - 109 mmol/L    Carbon Dioxide 30 20 - 32 mmol/L    Anion Gap 3 3 - 14 mmol/L    Glucose 90 70 - 99 mg/dL    Urea Nitrogen 21 7 - 30 mg/dL    Creatinine 0.78 0.66 - 1.25 mg/dL    GFR Estimate >90 >60 mL/min/[1.73_m2]    GFR Estimate If Black >90 >60 mL/min/[1.73_m2]    Calcium 8.9 8.5 - 10.1 mg/dL    Bilirubin Total 0.4 0.2 - 1.3 mg/dL    Albumin 3.7 3.4 - 5.0 g/dL    Protein Total 7.2 6.8 - 8.8 g/dL    Alkaline Phosphatase 76 40 - 150 U/L    ALT 26 0 - 70 U/L    AST 19 0 - 45 U/L   CBC with platelets and differential   Result Value Ref Range    WBC 6.9 4.0 - 11.0 10e9/L    RBC Count 4.43 4.4 - 5.9 10e12/L    Hemoglobin 13.6 13.3 - 17.7 g/dL    Hematocrit 40.6 40.0 - 53.0 %    MCV 92 78 - 100 fl    MCH 30.7 26.5 - 33.0 pg    MCHC 33.5 31.5 - 36.5 g/dL    RDW 13.6 10.0 - 15.0 %    Platelet Count 230 150 - 450 10e9/L    % Neutrophils 51.5 %    % Lymphocytes 29.8 %    % Monocytes 13.2 %    % Eosinophils 4.9 %    % Basophils 0.6 %    Absolute Neutrophil 3.5 1.6 - 8.3 10e9/L    Absolute Lymphocytes 2.1 0.8 - 5.3 10e9/L    Absolute Monocytes 0.9 0.0 - 1.3 10e9/L    Absolute Eosinophils 0.3 0.0 - 0.7 10e9/L    Absolute Basophils 0.0 0.0 - 0.2 10e9/L    Diff Method Automated Method    Hemoglobin A1c   Result Value Ref Range     Hemoglobin A1C 5.2 0 - 5.6 %   TSH with free T4 reflex   Result Value Ref Range    TSH 1.41 0.40 - 4.00 mU/L       ASSESSMENT/PLAN:   Bryce was seen today for physical.    Diagnoses and all orders for this visit:    Family history of diabetes mellitus  -     Hemoglobin A1c    Encounter for routine adult physical exam with abnormal findings    Hypertension goal BP (blood pressure) < 140/90  -     Lipid panel reflex to direct LDL Fasting  -     carvedilol (COREG) 6.25 MG tablet; Take 1 tablet (6.25 mg) by mouth 2 times daily  -     losartan (COZAAR) 100 MG tablet; Take 1 tablet (100 mg) by mouth daily  -     diclofenac (VOLTAREN) 1 % topical gel; Place onto the skin 4 times daily  -     TSH with free T4 reflex    Need for hepatitis C screening test    Screening for HIV (human immunodeficiency virus)    Gastroesophageal reflux disease with esophagitis  -     GASTROENTEROLOGY ADULT REF PROCEDURE ONLY  -     H Pylori antigen, stool; Future    Persistent insomnia  -     Discontinue: hydrOXYzine (VISTARIL) 25 MG capsule; Take 1-2 capsules (25-50 mg) by mouth nightly as needed (sleep)  -     TSH with free T4 reflex  -     hydrOXYzine (ATARAX) 25 MG tablet; Take 1-2 tablets (25-50 mg) by mouth nightly as needed (sleep)    Other orders  -     Cancel: BASIC METABOLIC PANEL  -     Cancel: Hepatitis C Screen Reflex to HCV RNA Quant and Genotype  -     Cancel: HIV Screening  -     Cancel: losartan (COZAAR) 50 MG tablet; Take 1 tablet (50 mg) by mouth daily  -     Comprehensive metabolic panel  -     CBC with platelets and differential      Increase losartan 100 mg daily.  Recheck blood pressure and basic metabolic panel in 1 month  Hydroxyzine at at bedtime as needed  Voltaren gel for arthritis pain 4 times daily  DASH diet recommended  Return to the clinic fasting for labs  H. pylori stool recommended.  EGD recommended patient to call and schedule    COUNSELING:   Reviewed preventive health counseling, as reflected in patient  "instructions    BP Readings from Last 1 Encounters:   11/26/18 (!) 150/98     Estimated body mass index is 27.38 kg/m  as calculated from the following:    Height as of 8/24/17: 1.702 m (5' 7\").    Weight as of 11/26/18: 79.3 kg (174 lb 12.8 oz).           reports that  has never smoked. he has never used smokeless tobacco.      Counseling Resources:  ATP IV Guidelines  Pooled Cohorts Equation Calculator  FRAX Risk Assessment  ICSI Preventive Guidelines  Dietary Guidelines for Americans, 2010  USDA's MyPlate  ASA Prophylaxis  Lung CA Screening    DES Mejia CNP  [facility]  "

## 2019-03-21 ASSESSMENT — PATIENT HEALTH QUESTIONNAIRE - PHQ9: SUM OF ALL RESPONSES TO PHQ QUESTIONS 1-9: 8

## 2019-03-21 ASSESSMENT — ANXIETY QUESTIONNAIRES: GAD7 TOTAL SCORE: 4

## 2019-03-27 ENCOUNTER — TELEPHONE (OUTPATIENT)
Dept: FAMILY MEDICINE | Facility: CLINIC | Age: 64
End: 2019-03-27

## 2019-03-27 NOTE — TELEPHONE ENCOUNTER
Reason for Call:  Procedure  has reached out to schedule diagnostic upper gi endoscopy X 3    Detailed comments: patient has failed to return calls    No further action necessary for procedure  at this time    Phone Number Patient can be reached at: Home number on file [phone] (home)    Best Time: NA    Can we leave a detailed message on this number? Not Applicable    Call taken on 3/27/2019 at 10:32 AM by Ileana Murray

## 2019-06-26 ENCOUNTER — ANCILLARY PROCEDURE (OUTPATIENT)
Dept: GENERAL RADIOLOGY | Facility: CLINIC | Age: 64
End: 2019-06-26
Attending: NURSE PRACTITIONER
Payer: COMMERCIAL

## 2019-06-26 ENCOUNTER — OFFICE VISIT (OUTPATIENT)
Dept: FAMILY MEDICINE | Facility: CLINIC | Age: 64
End: 2019-06-26
Payer: COMMERCIAL

## 2019-06-26 VITALS
SYSTOLIC BLOOD PRESSURE: 120 MMHG | WEIGHT: 174 LBS | DIASTOLIC BLOOD PRESSURE: 78 MMHG | BODY MASS INDEX: 27.31 KG/M2 | HEIGHT: 67 IN | TEMPERATURE: 97.9 F | HEART RATE: 74 BPM | RESPIRATION RATE: 20 BRPM

## 2019-06-26 DIAGNOSIS — S90.222A CONTUSION OF TOENAIL OF LEFT FOOT, INITIAL ENCOUNTER: ICD-10-CM

## 2019-06-26 DIAGNOSIS — M75.51 BURSITIS OF RIGHT SHOULDER: ICD-10-CM

## 2019-06-26 DIAGNOSIS — M18.12 OSTEOARTHRITIS OF LEFT THUMB: ICD-10-CM

## 2019-06-26 DIAGNOSIS — G47.00 PERSISTENT INSOMNIA: ICD-10-CM

## 2019-06-26 DIAGNOSIS — M67.919 DISORDER OF BURSAE AND TENDONS IN SHOULDER REGION: ICD-10-CM

## 2019-06-26 DIAGNOSIS — M25.511 ACUTE PAIN OF RIGHT SHOULDER: Primary | ICD-10-CM

## 2019-06-26 DIAGNOSIS — M71.9 DISORDER OF BURSAE AND TENDONS IN SHOULDER REGION: ICD-10-CM

## 2019-06-26 DIAGNOSIS — I10 HYPERTENSION GOAL BP (BLOOD PRESSURE) < 140/90: ICD-10-CM

## 2019-06-26 DIAGNOSIS — M25.511 ACUTE PAIN OF RIGHT SHOULDER: ICD-10-CM

## 2019-06-26 DIAGNOSIS — F90.2 ATTENTION DEFICIT HYPERACTIVITY DISORDER (ADHD), COMBINED TYPE: ICD-10-CM

## 2019-06-26 PROCEDURE — 73030 X-RAY EXAM OF SHOULDER: CPT | Mod: RT

## 2019-06-26 PROCEDURE — 99214 OFFICE O/P EST MOD 30 MIN: CPT | Mod: 25 | Performed by: NURSE PRACTITIONER

## 2019-06-26 PROCEDURE — 20610 DRAIN/INJ JOINT/BURSA W/O US: CPT | Mod: RT | Performed by: NURSE PRACTITIONER

## 2019-06-26 RX ORDER — GUANFACINE 1 MG/1
1 TABLET ORAL AT BEDTIME
Qty: 31 TABLET | Refills: 0 | Status: SHIPPED | OUTPATIENT
Start: 2019-06-26 | End: 2021-03-04

## 2019-06-26 RX ORDER — ACETAMINOPHEN 325 MG/1
650 TABLET ORAL EVERY 4 HOURS PRN
Status: ON HOLD | COMMUNITY
Start: 2019-06-26 | End: 2022-01-03

## 2019-06-26 RX ORDER — TRIAMCINOLONE ACETONIDE 40 MG/ML
40 INJECTION, SUSPENSION INTRA-ARTICULAR; INTRAMUSCULAR ONCE
Status: COMPLETED | OUTPATIENT
Start: 2019-06-26 | End: 2019-06-26

## 2019-06-26 RX ORDER — LOSARTAN POTASSIUM 100 MG/1
100 TABLET ORAL DAILY
Qty: 90 TABLET | Refills: 3 | Status: SHIPPED | OUTPATIENT
Start: 2019-06-26 | End: 2020-06-23

## 2019-06-26 RX ORDER — LIDOCAINE HYDROCHLORIDE 10 MG/ML
3 INJECTION, SOLUTION INFILTRATION; PERINEURAL ONCE
Status: COMPLETED | OUTPATIENT
Start: 2019-06-26 | End: 2019-06-26

## 2019-06-26 RX ADMIN — TRIAMCINOLONE ACETONIDE 40 MG: 40 INJECTION, SUSPENSION INTRA-ARTICULAR; INTRAMUSCULAR at 17:00

## 2019-06-26 RX ADMIN — LIDOCAINE HYDROCHLORIDE 3 ML: 10 INJECTION, SOLUTION INFILTRATION; PERINEURAL at 17:00

## 2019-06-26 ASSESSMENT — MIFFLIN-ST. JEOR: SCORE: 1552.89

## 2019-06-26 NOTE — PATIENT INSTRUCTIONS
Patient Education     Treating Insomnia     Learning to relax before bedtime can improve your sleep.   Good sleeping habits are a key part of treatment. If needed, some medicines may help you sleep better at first. Making healthy lifestyle changes and learning to relax can improve your sleep. Treating insomnia takes commitment. But trust that your efforts will pay off. Be sure to talk with your healthcare provider before taking any medicine.  Healthy lifestyle  Your lifestyle affects your health and your sleep. Here are some healthy habits:    Keep a regular sleep schedule. Go to bed and get up at the same time each day.    Exercise regularly. It may help you reduce stress. Avoid strenuous exercise for 2 to 4 hours before bedtime.    Avoid or limit naps, especially in the late afternoon.    Use your bed only for sleep and sex.    Don t spend too much time in bed trying to fall asleep. If you can t fall asleep, get up and do something (no electronics) until you become tired and drowsy.    Avoid or limit caffeine and nicotine for up to 6 hours before bedtime. They can keep you awake at night.     Also avoid alcohol for at least 4 to 6 hours before bedtime. It may help you fall asleep at first. But you will have more awakenings during the night. And your sleep will not be restful.  Before bedtime  To sleep better every night, try these tips:    Have a bedtime routine to let your body and mind know when it s time to sleep.    Think of going to bed as relaxing and enjoyable. Sleep will come sooner.    If your worries don t let you sleep, write them down in a diary. Then close it, and go to bed.    Make sure the room is not too hot or too cold. If it s not dark enough, an eye mask can help. If it s noisy, try using earplugs.    Don't eat a large meal just before bedtime.    Remove noises, bright lights, TVs, cell phones, and computers from your sleeping environment.    Use a comfortable mattress and pillow.  Learn to  relax  Stress, anxiety, and body tension may keep you awake at night. To unwind before bedtime, try a warm bath, meditation, or yoga. Also try the following:    Deep breathing. Sit or lie back in a chair. Take a slow, deep breath. Hold it for 5 counts. Then breathe out slowly through your mouth. Keep doing this until you feel relaxed.    Progressive muscle relaxation. Tense and then relax the muscles in your body as you breathe deeply. Start with your feet and work up your body to your neck and face.  Cognitive Behavioral Therapy (CBT)  CBT is the most effective treatment for long-term insomnia. It tries to address the underlying causes of your sleep problems, including your habits and how you think about sleep.  Individual Therapy  Art Simeon PsyD, DIVYA  Insomnia   [facility]: [phone]    [facility]: [phone]  [facility]  Visit [email or web address] or call [phone] to find a clinic close to you.  Suggested resources  The resources below may help you relax. This list is for information only. [facility] is not responsible for the quality of services or the actions of any person or organization. There may be a fee to use some of these resources.  Insomnia treatment books  Wilver Mind by Zaida Jasmine and Jeimy Stark (2013)  Overcoming Insomnia by Janak Galindo and Zaida Jasmine (2008)  Quiet Your Mind and Get to Sleep: Solutions to Insomnia for Those with Depression, Anxiety or Chronic Pain by Zaida Jasmine and Jeimy Stark (2009)  No More Sleepless Nights by Juan Francisco Padilla and Irasema Lee (1996)  Say Wilver to Insomnia by Mark Paul (2009)  The Insomnia Workbook by Gabby Fraser and Luis Vogel (2009)  The Insomnia Answer by Torito Persaud and Seamus Herrera (2006)  Stress management and relaxation books  The Relaxation and Stress Reduction Workbook by Idania Torres,  Latesha Bro and Dre Pollack (2008)  Stress Management Workbook: Techniques and Self-Assessment Procedures by Amy Buckner and Kenneth Wilson (1997)  A Mindfulness-Based Stress Reduction Workbook by Kelvin Torres and Alicia Tyler (2010)  The Complete Stress Management Workbook by Talha Villarreal, Addi Leon and Negrito Huerta (1996)  Online programs  www.SHUTi.me (pronounced shut eye). There is a fee for this program.   www.sleepIO.com (pronounced sleep ee oh). There is a fee for this program.  Other online resources  Deep breathing and progressive muscle relaxation (PMR):    http://www.Prizm Payment Services  Meditation:    www.TerraX MineralsranIntra-Cellular Therapies    www.Greencloud TechnologiesguidedLive MobilemeditationLive Mobilesite.com  Guided imagery:    http://www.Prizm Payment Services    http://InSupply.BioSET  (click on  Resource Library,  then choose  Meditation, Relaxation, Guided Imagery )  Apps for your mobile device:    Autogenic Training Progressive Muscle Relaxation by Ayannah    Calm: Meditation and Sleep Stories by Calm.com, Inc.    iMedicare Timer - Meditation [place] by MiniBrake.  This is not a prescription and these resources are optional. You must pay for any costs when using these resources. Please ask your insurance carrier if you can be reimbursed for these resources. If so, you are responsible for sending the needed details to your insurance carrier. These resources may also be tax deductible as medical expenses. Check with your .  Date Last Reviewed: 5/18/2018  Clinically reviewed by Art Simeon PsyD, DIVYA, [facility], Director of the Insomnia and Behavioral Sleep Health Program, [facility].    9957-5786 [employer]. [address] [place] [address], [place] [address]. All rights reserved. This information is not intended as a substitute for professional medical care. Always follow your healthcare professional's instructions.           Patient Education     Treating Insomnia      Learning to relax before bedtime can improve your sleep.   Good sleeping habits are a key part of treatment. If needed, some medicines may help you sleep better at first. Making healthy lifestyle changes and learning to relax can improve your sleep. Treating insomnia takes commitment. But trust that your efforts will pay off. Be sure to talk with your healthcare provider before taking any medicine.  Healthy lifestyle  Your lifestyle affects your health and your sleep. Here are some healthy habits:    Keep a regular sleep schedule. Go to bed and get up at the same time each day.    Exercise regularly. It may help you reduce stress. Avoid strenuous exercise for 2 to 4 hours before bedtime.    Avoid or limit naps, especially in the late afternoon.    Use your bed only for sleep and sex.    Don t spend too much time in bed trying to fall asleep. If you can t fall asleep, get up and do something (no electronics) until you become tired and drowsy.    Avoid or limit caffeine and nicotine for up to 6 hours before bedtime. They can keep you awake at night.     Also avoid alcohol for at least 4 to 6 hours before bedtime. It may help you fall asleep at first. But you will have more awakenings during the night. And your sleep will not be restful.  Before bedtime  To sleep better every night, try these tips:    Have a bedtime routine to let your body and mind know when it s time to sleep.    Think of going to bed as relaxing and enjoyable. Sleep will come sooner.    If your worries don t let you sleep, write them down in a diary. Then close it, and go to bed.    Make sure the room is not too hot or too cold. If it s not dark enough, an eye mask can help. If it s noisy, try using earplugs.    Don't eat a large meal just before bedtime.    Remove noises, bright lights, TVs, cell phones, and computers from your sleeping environment.    Use a comfortable mattress and pillow.  Learn to relax  Stress, anxiety, and body tension may  keep you awake at night. To unwind before bedtime, try a warm bath, meditation, or yoga. Also try the following:    Deep breathing. Sit or lie back in a chair. Take a slow, deep breath. Hold it for 5 counts. Then breathe out slowly through your mouth. Keep doing this until you feel relaxed.    Progressive muscle relaxation. Tense and then relax the muscles in your body as you breathe deeply. Start with your feet and work up your body to your neck and face.  Cognitive Behavioral Therapy (CBT)  CBT is the most effective treatment for long-term insomnia. It tries to address the underlying causes of your sleep problems, including your habits and how you think about sleep.  Individual Therapy  Art Simeon PsyD,   Insomnia   [facility]    [facility]: [phone]    [facility]: [phone]  [facility]  Visit [email or web address] or call [phone] to find a clinic close to you.  Suggested resources  The resources below may help you relax. This list is for information only. [facility] is not responsible for the quality of services or the actions of any person or organization. There may be a fee to use some of these resources.  Insomnia treatment books  Wilver Mind by Zaida Jasmine and Jeimy Stark (2013)  Overcoming Insomnia by Janak Galindo and Zaida Jasmine (2008)  Quiet Your Mind and Get to Sleep: Solutions to Insomnia for Those with Depression, Anxiety or Chronic Pain by Zaida Jasmine and Jeimy Stark (2009)  No More Sleepless Nights by Juan Francisco Padilla and Irasema Lee (1996)  Say Wilver to Insomnia by Mark Paul (2009)  The Insomnia Workbook by Gabby Fraser and Luis Vogel (2009)  The Insomnia Answer by Torito Persaud and Seamus Herrera (2006)  Stress management and relaxation books  The Relaxation and Stress Reduction Workbook by Idania Torres, Latesha Bro and Dre Pollack  (2008)  Stress Management Workbook: Techniques and Self-Assessment Procedures by Amy Buckner and Kenneth Wilson (1997)  A Mindfulness-Based Stress Reduction Workbook by Kelvin Torres and Alicia Tyler (2010)  The Complete Stress Management Workbook by Talha Villarreal, Addi Leon and Negrito Huerta (1996)  Online programs  www.SHUTi.me (pronounced shut eye). There is a fee for this program.   www.sleepIO.com (pronounced sleep ee oh). There is a fee for this program.  Other online resources  Deep breathing and progressive muscle relaxation (PMR):    http://www.PlanG  Meditation:    www.SiteJabber    www.Agito NetworksguidedAnjukemeditationAnjukesite.com  Guided imagery:    http://www.PlanG    http://Rapp IT Up.StreamStar  (click on  Resource Library,  then choose  Meditation, Relaxation, Guided Imagery )  Apps for your mobile device:    Autogenic Training Progressive Muscle Relaxation by [employer]    Calm: Meditation and Sleep Stories by OxyBand Technologies, Inc.    [employer] Timer - Meditation [place] by [place].  This is not a prescription and these resources are optional. You must pay for any costs when using these resources. Please ask your insurance carrier if you can be reimbursed for these resources. If so, you are responsible for sending the needed details to your insurance carrier. These resources may also be tax deductible as medical expenses. Check with your .  Date Last Reviewed: 5/18/2018  Clinically reviewed by Art Simeon PsyD, DIVYA, [facility], Director of the Insomnia and Behavioral Sleep Health Program, [facility].    0596-8539 [employer]. [address] [place] [address]. All rights reserved. This information is not intended as a substitute for professional medical care. Always follow your healthcare professional's instructions.

## 2019-06-26 NOTE — PROGRESS NOTES
Subjective     Bryce Diaz is a 63 year old male who presents to clinic today for the following health issues:    HPI   Joint Pain    Onset: 3 months ago     Description:   Location: right shoulder  Character: Sharp    Intensity: moderate, severe    Progression of Symptoms: worse    Accompanying Signs & Symptoms:  Other symptoms: radiation of pain to arm     History:   Previous similar pain: YES      Precipitating factors:   Trauma or overuse: no     Alleviating factors:  Improved by: immobilization   Patient states that when he has a Cortizone shot it went away for about 6 years   Would like injection today      Therapies Tried and outcome: Cortizone shot       Nail       Duration: 3 days ago     Description (location/character/radiation): left foot, big toe nail Purple    Intensity:  moderate    Accompanying signs and symptoms: none    History (similar episodes/previous evaluation): None    Precipitating or alleviating factors: None    Therapies tried and outcome: tylenol      Left thumb pain  X 6 months.   Worse at night.   ADD in the past treated with adderall. Better now without any medications.  How ever his lifelong insomnia persists.  trazodone taken for years. Tired when taking  Hydroxyzine taken this spring caused morning carry over  Benadryl only lasts for about 4 hours.   Melatonin in the past take him up to 3 mg without any relief    Patient Active Problem List   Diagnosis     Esophageal reflux     Attention deficit hyperactivity disorder (ADHD)     Myalgia and myositis     CARDIOVASCULAR SCREENING; LDL GOAL LESS THAN 130     Hypertension goal BP (blood pressure) < 140/90     Intervertebral lumbar disc disorder with myelopathy, lumbar region     Low back pain radiating to both legs     Advanced directives, counseling/discussion     Sciatic nerve pain     Mild major depression (H)     BPH (benign prostatic hyperplasia)     Lumbar stenosis     Past Surgical History:   Procedure Laterality Date     C  "APPENDECTOMY      5 years of age     C NONSPECIFIC PROCEDURE  1983    neck fusion     COLONOSCOPY N/A 9/23/2015    Procedure: COLONOSCOPY;  Surgeon: Juan Francisco Cornejo MD;  Location: [facility]     HC RECONSTRUCT TENDON GOLDEN EA, W LOCAL TISSUES  08/07/1998    A-1 pulley release, right fourth finger     HC UGI ENDOSCOPY DIAG W OR W/O BRUSH/WASH  2002    bx. pending  - use proton pump inhibitor indefinitely     HEMILAMINECTOMY, DISCECTOMY LUMBAR TWO LEVELS, COMBINED Right 10/21/2015    Procedure: COMBINED HEMILAMINECTOMY, DISCECTOMY LUMBAR TWO LEVELS;  Surgeon: Jesus Ornelas MD;  Location: [facility]     INJECT EPIDURAL LUMBAR  11/14/13,2/10/14    [facility]     INJECT EPIDURAL LUMBAR  5/13/14,8/5/14    [facility]     INJECT EPIDURAL LUMBAR  12/15/14    [facility]       Social History     Tobacco Use     Smoking status: Never Smoker     Smokeless tobacco: Never Used     Tobacco comment: no smoking in the home   Substance Use Topics     Alcohol use: Yes     Comment: rare     Family History   Problem Relation Age of Onset     Cancer Mother      Cancer Father      Cancer Brother      Cancer Sister      C.A.D. No family hx of            -------------------------------------  Reviewed and updated as needed this visit by Provider         Review of Systems    ROS: 10 point ROS neg other than the symptoms noted above in the HPI.        Objective    /78 (BP Location: Right arm, Cuff Size: Adult Regular)   Pulse 74   Temp 97.9  F (36.6  C) (Tympanic)   Resp 20   Ht 1.702 m (5' 7\")   Wt 78.9 kg (174 lb)   BMI 27.25 kg/m    Body mass index is 27.25 kg/m .  Physical Exam   GENERAL: healthy, alert and no distress  EYES: Eyes grossly normal to inspection, PERRL and conjunctivae and sclerae normal  HENT: ear canals and TM's normal, nose and mouth without ulcers or lesions  NECK: no adenopathy, no asymmetry, masses, or scars and thyroid normal to palpation  RESP: lungs clear to auscultation - no rales, " rhonchi or wheezes  CV: regular rate and rhythm, normal S1 S2, no S3 or S4, no murmur, click or rub, no peripheral edema and peripheral pulses strong  ABDOMEN: soft, nontender, no hepatosplenomegaly, no masses and bowel sounds normal  MS: decreased range of motion right shoulder with overhead movement across the chest and behind the back, peripheral pulses normal and tenderness to palpation axillary  SKIN: Subungual hematoma left great toe.  Pedal pulses 2+  No drainage.  NEURO: Normal strength and tone, mentation intact and speech normal  PSYCH: mentation appears normal, affect normal/bright    Results for orders placed or performed in visit on 03/20/19   Lipid panel reflex to direct LDL Fasting   Result Value Ref Range    Cholesterol 222 (H) <200 mg/dL    Triglycerides 113 <150 mg/dL    HDL Cholesterol 50 >39 mg/dL    LDL Cholesterol Calculated 149 (H) <100 mg/dL    Non HDL Cholesterol 172 (H) <130 mg/dL   Comprehensive metabolic panel   Result Value Ref Range    Sodium 140 133 - 144 mmol/L    Potassium 4.5 3.4 - 5.3 mmol/L    Chloride 107 94 - 109 mmol/L    Carbon Dioxide 30 20 - 32 mmol/L    Anion Gap 3 3 - 14 mmol/L    Glucose 90 70 - 99 mg/dL    Urea Nitrogen 21 7 - 30 mg/dL    Creatinine 0.78 0.66 - 1.25 mg/dL    GFR Estimate >90 >60 mL/min/[1.73_m2]    GFR Estimate If Black >90 >60 mL/min/[1.73_m2]    Calcium 8.9 8.5 - 10.1 mg/dL    Bilirubin Total 0.4 0.2 - 1.3 mg/dL    Albumin 3.7 3.4 - 5.0 g/dL    Protein Total 7.2 6.8 - 8.8 g/dL    Alkaline Phosphatase 76 40 - 150 U/L    ALT 26 0 - 70 U/L    AST 19 0 - 45 U/L   CBC with platelets and differential   Result Value Ref Range    WBC 6.9 4.0 - 11.0 10e9/L    RBC Count 4.43 4.4 - 5.9 10e12/L    Hemoglobin 13.6 13.3 - 17.7 g/dL    Hematocrit 40.6 40.0 - 53.0 %    MCV 92 78 - 100 fl    MCH 30.7 26.5 - 33.0 pg    MCHC 33.5 31.5 - 36.5 g/dL    RDW 13.6 10.0 - 15.0 %    Platelet Count 230 150 - 450 10e9/L    % Neutrophils 51.5 %    % Lymphocytes 29.8 %    %  Monocytes 13.2 %    % Eosinophils 4.9 %    % Basophils 0.6 %    Absolute Neutrophil 3.5 1.6 - 8.3 10e9/L    Absolute Lymphocytes 2.1 0.8 - 5.3 10e9/L    Absolute Monocytes 0.9 0.0 - 1.3 10e9/L    Absolute Eosinophils 0.3 0.0 - 0.7 10e9/L    Absolute Basophils 0.0 0.0 - 0.2 10e9/L    Diff Method Automated Method    Hemoglobin A1c   Result Value Ref Range    Hemoglobin A1C 5.2 0 - 5.6 %   TSH with free T4 reflex   Result Value Ref Range    TSH 1.41 0.40 - 4.00 mU/L           Assessment & Plan     Bryce was seen today for musculoskeletal problem.    Diagnoses and all orders for this visit:    Acute pain of right shoulder  -     diclofenac (VOLTAREN) 1 % topical gel; Apply 4 g topically 4 times daily  -     XR Shoulder Right 2 Views; Future    Hypertension goal BP (blood pressure) < 140/90  -     losartan (COZAAR) 100 MG tablet; Take 1 tablet (100 mg) by mouth daily  -     guanFACINE (TENEX) 1 MG tablet; Take 1 tablet (1 mg) by mouth At Bedtime    Contusion of toenail of left foot, initial encounter    Persistent insomnia  -     guanFACINE (TENEX) 1 MG tablet; Take 1 tablet (1 mg) by mouth At Bedtime    Attention deficit hyperactivity disorder (ADHD), combined type  -     guanFACINE (TENEX) 1 MG tablet; Take 1 tablet (1 mg) by mouth At Bedtime    Bursitis of right shoulder  -     Large Joint/Bursa injection and/or drainage (Shoulder, Knee)  -     lidocaine 1 % injection 3 mL  -     triamcinolone (KENALOG-40) injection 40 mg    Osteoarthritis of left thumb    Disorder of bursae and tendons in shoulder region    Voltaren gel to left thumb as needed  Ortho hand consultation with ongoing symptoms  He is to call if consultation is desired  Rest of the right shoulder  Tylenol up to 3500 mg daily  Keep left great toe clean and dry.  Watch for any signs of infection.  Wrap for comfort.  Consider evacuation if needed    After verbal consent using sterile technique  A steroid injection given today right posterior shoulder using  40 mg of Kenalog and 3 cc of 1% lidocaine patient tolerated well.  Bacitracin and Band-Aid applied.  Stop hydroxyzine   Trial guanfacine 1 mg by mouth at at bedtime may give dual benefit of blood pressure control and help with ongoing insomnia most likely related to his ADD     Call or return to the clinic with any worsening of symptoms or no resolution. Patient/Parent verbalized understanding and is in agreement. Medication side effects reviewed.   Current Outpatient Medications   Medication Sig Dispense Refill     acetaminophen (TYLENOL) 325 MG tablet Take 2 tablets (650 mg) by mouth every 4 hours as needed for mild pain       carvedilol (COREG) 6.25 MG tablet Take 1 tablet (6.25 mg) by mouth 2 times daily 180 tablet 3     diclofenac (VOLTAREN) 1 % topical gel Apply 4 g topically 4 times daily 100 g 5     guanFACINE (TENEX) 1 MG tablet Take 1 tablet (1 mg) by mouth At Bedtime 31 tablet 0     losartan (COZAAR) 100 MG tablet Take 1 tablet (100 mg) by mouth daily 90 tablet 3     hydrOXYzine (ATARAX) 25 MG tablet Take 1-2 tablets (25-50 mg) by mouth nightly as needed (sleep) (Patient not taking: Reported on 6/26/2019) 60 tablet 5     Chart documentation with Dragon Voice recognition Software. Although reviewed after completion, some words and grammatical errors may remain.    See patient instructions    Return in about 6 months (around 12/26/2019) for HTN.    DES Mejia [facility]

## 2019-06-28 ENCOUNTER — TELEPHONE (OUTPATIENT)
Dept: FAMILY MEDICINE | Facility: CLINIC | Age: 64
End: 2019-06-28

## 2019-06-28 DIAGNOSIS — Z83.3 FAMILY HISTORY OF DIABETES MELLITUS: ICD-10-CM

## 2019-06-28 DIAGNOSIS — M75.51 BURSITIS OF RIGHT SHOULDER: ICD-10-CM

## 2019-06-28 DIAGNOSIS — M25.511 ACUTE PAIN OF RIGHT SHOULDER: Primary | ICD-10-CM

## 2019-06-28 NOTE — TELEPHONE ENCOUNTER
Pt called. I reviewed R shoulder x-ray results. States his shoulder is a little better post injection but we would like to try PT is you think it would be beneficial and affordable for him. Serena Nielsen RN

## 2019-06-28 NOTE — TELEPHONE ENCOUNTER
Reason for Call:  Other     Detailed comments: Patient wants shoulder results explained to hime    Phone Number Patient can be reached at: Home number on file [phone] (home)    Best Time:     Can we leave a detailed message on this number? YES    Call taken on 6/28/2019 at 3:56 PM by Yumiko Chopra

## 2019-08-13 ENCOUNTER — APPOINTMENT (OUTPATIENT)
Dept: FAMILY MEDICINE | Facility: CLINIC | Age: 64
End: 2019-08-13

## 2019-08-13 ENCOUNTER — OFFICE VISIT (OUTPATIENT)
Dept: OCCUPATIONAL MEDICINE | Facility: CLINIC | Age: 64
End: 2019-08-13

## 2019-08-13 VITALS — BODY MASS INDEX: 27.59 KG/M2 | HEIGHT: 67 IN | WEIGHT: 175.8 LBS

## 2019-08-13 DIAGNOSIS — Z53.9 ERRONEOUS ENCOUNTER--DISREGARD: ICD-10-CM

## 2019-08-13 DIAGNOSIS — Z13.83 SCREENING FOR RESPIRATORY CONDITION: Primary | ICD-10-CM

## 2019-08-13 PROCEDURE — 94010 BREATHING CAPACITY TEST: CPT | Performed by: NURSE PRACTITIONER

## 2019-08-13 PROCEDURE — 99499 UNLISTED E&M SERVICE: CPT | Performed by: NURSE PRACTITIONER

## 2019-08-13 ASSESSMENT — MIFFLIN-ST. JEOR: SCORE: 1561.05

## 2019-09-09 ENCOUNTER — TELEPHONE (OUTPATIENT)
Dept: FAMILY MEDICINE | Facility: CLINIC | Age: 64
End: 2019-09-09

## 2019-09-09 NOTE — TELEPHONE ENCOUNTER
"Reports possibly taking \"double losartan 100mg dose\" as states has two bottles of losartan with different color pills and no carvedilol.  States checked with pharm and was told both med bottles were losartan.  No home BP monitoring, denies lightheadedness/ dizziness/ weakness or any other abnormal sx.  RN checked with [facility]- losartan LR 07-02-19 qty 90, carvedilol currently being filled.  Advised RN visit for med review, bring med bottles. Scheduled with ANITHA SALGADO tomorrow.  LUIZ Husain RN    "

## 2019-09-09 NOTE — TELEPHONE ENCOUNTER
Reason for Call:  Other     Detailed comments: Patient just realized he has been taken a double dose of his losartan - Should take 100 mg and is taking two.  Has two different Rx's one pill is white and one is green - please advise     Phone Number Patient can be reached at: Home number on file [phone] (home)    Best Time:     Can we leave a detailed message on this number? YES    Call taken on 9/9/2019 at 4:13 PM by Yumiko Chopra

## 2019-09-10 ENCOUNTER — ALLIED HEALTH/NURSE VISIT (OUTPATIENT)
Dept: FAMILY MEDICINE | Facility: CLINIC | Age: 64
End: 2019-09-10
Payer: COMMERCIAL

## 2019-09-10 ENCOUNTER — TELEPHONE (OUTPATIENT)
Dept: FAMILY MEDICINE | Facility: CLINIC | Age: 64
End: 2019-09-10

## 2019-09-10 VITALS — SYSTOLIC BLOOD PRESSURE: 124 MMHG | HEART RATE: 72 BPM | DIASTOLIC BLOOD PRESSURE: 82 MMHG | RESPIRATION RATE: 16 BRPM

## 2019-09-10 DIAGNOSIS — Z01.30 BP CHECK: Primary | ICD-10-CM

## 2019-09-10 DIAGNOSIS — I10 HYPERTENSION GOAL BP (BLOOD PRESSURE) < 140/90: Primary | ICD-10-CM

## 2019-09-10 PROCEDURE — 99207 ZZC NO CHARGE NURSE ONLY: CPT

## 2019-09-10 NOTE — TELEPHONE ENCOUNTER
Noted.  Thanks for the update.  Repeat BMP to monitor for change associated with medication overuse.   BP and HR recheck in 2 weeks recommended.   Meds as previously prescribed.  Thanks Lenore LOPEZP-BC

## 2019-09-10 NOTE — TELEPHONE ENCOUNTER
S-(situation): Torito is in clinic today to report that he has been taking losartan 200 mg since June 2019.  Also, has not taken the carvedilol since June as well.    He had two bottles of the losartan 100 mg tabs and took one from each bottle every day, he thought should take one from each bottle because they were a different color.    He has been taking the losartan 100 mg daily for two days now.  Is going to  RX for carvedilol today and restart it    B-(background): HTN, low back pain, ADHD    A-(assessment): appears patient not readings labels and for couple of months took losartan 200 mg daily.  BP today is 124/82 pulse 72 and regular    PLAN:  I told Torito I would send this message to Lenore Cornejo and call him tomorrow  Emily Lamb RN

## 2020-04-24 ENCOUNTER — OFFICE VISIT (OUTPATIENT)
Dept: FAMILY MEDICINE | Facility: CLINIC | Age: 65
End: 2020-04-24
Payer: COMMERCIAL

## 2020-04-24 VITALS
DIASTOLIC BLOOD PRESSURE: 82 MMHG | TEMPERATURE: 98.3 F | HEIGHT: 67 IN | RESPIRATION RATE: 20 BRPM | BODY MASS INDEX: 27.62 KG/M2 | SYSTOLIC BLOOD PRESSURE: 120 MMHG | WEIGHT: 176 LBS | HEART RATE: 68 BPM | OXYGEN SATURATION: 97 %

## 2020-04-24 DIAGNOSIS — M79.642 PAIN OF LEFT HAND: ICD-10-CM

## 2020-04-24 DIAGNOSIS — K21.00 GASTROESOPHAGEAL REFLUX DISEASE WITH ESOPHAGITIS: Primary | ICD-10-CM

## 2020-04-24 DIAGNOSIS — M75.51 BURSITIS OF RIGHT SHOULDER: ICD-10-CM

## 2020-04-24 PROCEDURE — 20610 DRAIN/INJ JOINT/BURSA W/O US: CPT | Mod: RT | Performed by: NURSE PRACTITIONER

## 2020-04-24 PROCEDURE — 99214 OFFICE O/P EST MOD 30 MIN: CPT | Mod: 25 | Performed by: NURSE PRACTITIONER

## 2020-04-24 RX ORDER — CIMETIDINE 400 MG
400 TABLET ORAL 2 TIMES DAILY
Qty: 60 TABLET | Refills: 2 | Status: SHIPPED | OUTPATIENT
Start: 2020-04-24 | End: 2020-09-01

## 2020-04-24 RX ORDER — LIDOCAINE HYDROCHLORIDE 10 MG/ML
4 INJECTION, SOLUTION INFILTRATION; PERINEURAL ONCE
Status: COMPLETED | OUTPATIENT
Start: 2020-04-24 | End: 2020-04-24

## 2020-04-24 RX ORDER — TRIAMCINOLONE ACETONIDE 40 MG/ML
40 INJECTION, SUSPENSION INTRA-ARTICULAR; INTRAMUSCULAR ONCE
Status: COMPLETED | OUTPATIENT
Start: 2020-04-24 | End: 2020-04-24

## 2020-04-24 RX ADMIN — TRIAMCINOLONE ACETONIDE 40 MG: 40 INJECTION, SUSPENSION INTRA-ARTICULAR; INTRAMUSCULAR at 15:05

## 2020-04-24 RX ADMIN — LIDOCAINE HYDROCHLORIDE 4 ML: 10 INJECTION, SOLUTION INFILTRATION; PERINEURAL at 15:05

## 2020-04-24 ASSESSMENT — MIFFLIN-ST. JEOR: SCORE: 1556.96

## 2020-04-24 ASSESSMENT — PAIN SCALES - GENERAL: PAINLEVEL: SEVERE PAIN (7)

## 2020-04-24 NOTE — NURSING NOTE
Clinic Administered Medication Documentation      Injection Documentation     Injection was administered by provider (please see MAR for given by information). Please see MAR and medication order for additional information.     Site: Joint injection   Medication Used: Lidocaine 1% and Kenalog 40mg    Expiration Date:  Lidocaine 03/2021   Kenalog 10/2021

## 2020-04-24 NOTE — PATIENT INSTRUCTIONS
Patient Education     Bursitis    You have bursitis. This is an inflammation of the bursa. These are small, fluid-filled sacs that surround the larger joints of the body. The bursa help the muscles and tendons move smoothly over the joints.  Bursitis often happens in the shoulder. But it can also affect the elbows, hips, pelvis, knees, toes, and heels. Bursitis can be caused by injury, overuse of the joint, or infection of the bursa. Symptoms include pain and tenderness over a joint. Symptoms get worse with movement.  Bursitis is treated with an anti-inflammatory medicine and by resting the joint. More severe cases require injection of medicine directly into the bursa. In the case of infection, surgery and antibiotics may be needed.  Home care    Rest the painful joint and protect it from movement. This will allow the inflammation to heal faster.    Apply an ice pack over the injured area for no more than 15 to 20 minutes. Do this every 3 to 6 hours for the first 24 to 48 hours. Keep using ice packs 3 to 4 times a day until the pain and swelling improves.     To make an ice pack, put ice cubes in a sealed plastic bag. Wrap the bag in a clean, thin towel or cloth. Never put ice or an ice pack directly on the skin. As the ice melts, be careful to not to get the wrap or splint wet.    You may take over-the-counter pain medicine to treat pain and inflammation, unless another medicine was prescribed. Anti-inflammatory pain medicines may be more effective. Talk with your provider before using these medicines if you have chronic liver or kidney disease, or ever had a stomach ulcer or gastrointestinal bleeding.    As your symptoms improve, slowly begin to move the joint. Don't overuse the joint. This may cause the symptoms to flare up again.  When to seek medical advice  Call your healthcare provider right away if any of these occur:    Redness or warmth over the painful area    Increasing pain or swelling at the  joint    Fever of 100.4 F (38 C) or above lasting for 24 to 48 hours, or as advised    Chills  Date Last Reviewed: 5/1/2018 2000-2019 [employer]. [address] [place] [address], [place] [address]. All rights reserved. This information is not intended as a substitute for professional medical care. Always follow your healthcare professional's instructions.

## 2020-04-24 NOTE — PROGRESS NOTES
Subjective     Bryce Diaz is a 64 year old male who presents to clinic today for the following health issues:    HPI   Musculoskeletal problem/pain      Duration: One month. Getting worse the last few days    Description  Location: Right shoulder     Intensity:  moderate, severe    Accompanying signs and symptoms: none    History  Previous similar problem: YES- Patient states that he had gotten an injection about 6 months ago and that really seemed to help   Previous evaluation:  x-ray    Precipitating or alleviating factors:  Trauma or overuse: YES  Aggravating factors include: overuse    Therapies tried and outcome: Injection, Tylenol, heating pad, Voltaren gel    BP Readings from Last 3 Encounters:   04/24/20 120/82   09/10/19 124/82   06/26/19 120/78       -------------------------------------    Patient Active Problem List   Diagnosis     Esophageal reflux     Attention deficit hyperactivity disorder (ADHD)     Myalgia and myositis     CARDIOVASCULAR SCREENING; LDL GOAL LESS THAN 130     Hypertension goal BP (blood pressure) < 140/90     Intervertebral lumbar disc disorder with myelopathy, lumbar region     Low back pain radiating to both legs     Advanced directives, counseling/discussion     Sciatic nerve pain     Mild major depression (H)     BPH (benign prostatic hyperplasia)     Lumbar stenosis     Past Surgical History:   Procedure Laterality Date     C APPENDECTOMY      5 years of age     C NONSPECIFIC PROCEDURE  1983    neck fusion     COLONOSCOPY N/A 9/23/2015    Procedure: COLONOSCOPY;  Surgeon: Juan Francisco Cornejo MD;  Location: [facility]     HC RECONSTRUCT TENDON GOLDEN EA, W LOCAL TISSUES  08/07/1998    A-1 pulley release, right fourth finger     HC UGI ENDOSCOPY DIAG W OR W/O BRUSH/WASH  2002    bx. pending  - use proton pump inhibitor indefinitely     HEMILAMINECTOMY, DISCECTOMY LUMBAR TWO LEVELS, COMBINED Right 10/21/2015    Procedure: COMBINED HEMILAMINECTOMY, DISCECTOMY LUMBAR TWO LEVELS;   "Surgeon: Jesus Ornelas MD;  Location[facility]     INJECT EPIDURAL LUMBAR  11/14/13,2/10/14    [facility]     INJECT EPIDURAL LUMBAR  5/13/14,8/5/14    [facility]     INJECT EPIDURAL LUMBAR  12/15/14    [facility]       Social History     Tobacco Use     Smoking status: Never Smoker     Smokeless tobacco: Never Used     Tobacco comment: no smoking in the home   Substance Use Topics     Alcohol use: Yes     Comment: rare     Family History   Problem Relation Age of Onset     Cancer Mother      Cancer Father      Cancer Brother      Cancer Sister      C.A.D. No family hx of            -------------------------------------  Reviewed and updated as needed this visit by Provider         Review of Systems   ROS COMP: Constitutional, HEENT, cardiovascular, pulmonary, GI, , musculoskeletal, neuro, skin, endocrine and psych systems are negative, except as otherwise noted.      Objective    /82 (BP Location: Right arm, Patient Position: Sitting, Cuff Size: Adult Regular)   Pulse 68   Temp 98.3  F (36.8  C) (Tympanic)   Resp 20   Ht 1.702 m (5' 7\")   Wt 79.8 kg (176 lb)   SpO2 97%   BMI 27.57 kg/m    Body mass index is 27.57 kg/m .  Physical Exam   GENERAL: healthy, alert and no distress  EYES: Eyes grossly normal to inspection, PERRL and conjunctivae and sclerae normal  HENT: ear canals and TM's normal, nose and mouth without ulcers or lesions  NECK: no adenopathy, no asymmetry, masses, or scars and thyroid normal to palpation  RESP: lungs clear to auscultation - no rales, rhonchi or wheezes  CV: regular rate and rhythm, normal S1 S2, no S3 or S4, no murmur, click or rub, no peripheral edema and peripheral pulses strong  ABDOMEN: soft, nontender, no hepatosplenomegaly, no masses and bowel sounds normal  MS: decreased range of motion RIGHT SHOULDER and peripheral pulses normal  Decreased range of motion left hand.  Pain with palpation over the PIP joint left thumb with Heberden's nodes " present bilaterally  SKIN: no suspicious lesions or rashes  NEURO: Normal strength and tone, mentation intact and speech normal  PSYCH: mentation appears normal, affect normal/bright    Diagnostic Test Results:  Labs reviewed in Epic  No results found for any visits on 04/24/20.        Assessment & Plan     Bryce was seen today for shoulder pain.    Diagnoses and all orders for this visit:    Gastroesophageal reflux disease with esophagitis  -     GASTROENTEROLOGY ADULT REF PROCEDURE ONLY  -     cimetidine (TAGAMET) 400 MG tablet; Take 1 tablet (400 mg) by mouth 2 times daily    Bursitis of right shoulder  -     triamcinolone (KENALOG-40) injection 40 mg  -     lidocaine 1 % injection 4 mL  -     MR Shoulder Right w/o Contrast; Future    Pain of left hand  -     XR Hand Left G/E 3 Views; Future    After verbal consent was obtained this area was cleansed well with Betadine scrub x3  A steroid injection was performed RIGHT SHOULDER POSTERIOR  using 1% plain Lidocaine and 40 mg of Kenalog. This was well tolerated. B & B applied.     Consider work-up with ongoing joint pain  EGD recommended with ongoing GERD symptoms  Begin Tagamet 400 mg twice daily as needed for ongoing symptoms  MRI of the right shoulder recommended.  X-ray of the left hand recommended.  Post COVID        Call or return to the clinic with any worsening of symptoms or no resolution. Patient/Parent verbalized understanding and is in agreement. Medication side effects reviewed.   Current Outpatient Medications   Medication Sig Dispense Refill     acetaminophen (TYLENOL) 325 MG tablet Take 2 tablets (650 mg) by mouth every 4 hours as needed for mild pain       carvedilol (COREG) 6.25 MG tablet Take 1 tablet (6.25 mg) by mouth 2 times daily 180 tablet 3     cimetidine (TAGAMET) 400 MG tablet Take 1 tablet (400 mg) by mouth 2 times daily 60 tablet 2     diclofenac (VOLTAREN) 1 % topical gel Apply 4 g topically 4 times daily 100 g 5     hydrOXYzine  (ATARAX) 25 MG tablet Take 1-2 tablets (25-50 mg) by mouth nightly as needed (sleep) 60 tablet 5     losartan (COZAAR) 100 MG tablet Take 1 tablet (100 mg) by mouth daily 90 tablet 3     guanFACINE (TENEX) 1 MG tablet Take 1 tablet (1 mg) by mouth At Bedtime (Patient not taking: Reported on 4/24/2020) 31 tablet 0     Chart documentation with Dragon Voice recognition Software. Although reviewed after completion, some words and grammatical errors may remain.    See Patient Instructions    Sees me  DES Mejia [facility]

## 2020-05-13 ENCOUNTER — HOSPITAL ENCOUNTER (EMERGENCY)
Facility: CLINIC | Age: 65
Discharge: HOME | End: 2020-05-13
Attending: FAMILY MEDICINE | Admitting: FAMILY MEDICINE
Payer: OTHER MISCELLANEOUS

## 2020-05-13 VITALS
OXYGEN SATURATION: 98 % | SYSTOLIC BLOOD PRESSURE: 125 MMHG | RESPIRATION RATE: 16 BRPM | DIASTOLIC BLOOD PRESSURE: 85 MMHG | BODY MASS INDEX: 29.13 KG/M2 | HEART RATE: 81 BPM | TEMPERATURE: 97 F | WEIGHT: 186 LBS

## 2020-05-13 DIAGNOSIS — S61.012A LACERATION OF LEFT THUMB WITHOUT FOREIGN BODY WITHOUT DAMAGE TO NAIL, INITIAL ENCOUNTER: ICD-10-CM

## 2020-05-13 PROCEDURE — 12001 RPR S/N/AX/GEN/TRNK 2.5CM/<: CPT

## 2020-05-13 PROCEDURE — 99282 EMERGENCY DEPT VISIT SF MDM: CPT | Mod: 25 | Performed by: FAMILY MEDICINE

## 2020-05-13 PROCEDURE — 99283 EMERGENCY DEPT VISIT LOW MDM: CPT | Mod: 25

## 2020-05-13 PROCEDURE — 12001 RPR S/N/AX/GEN/TRNK 2.5CM/<: CPT | Mod: Z6 | Performed by: FAMILY MEDICINE

## 2020-05-13 NOTE — ED PROVIDER NOTES
History     Chief Complaint   Patient presents with     Laceration     HPI  Bryce Diaz is a 64 year old male who presents with laceration to the left thumb on a putty knife which she was using to try to remove something stuck to machine at work.  This occurred at work.  The knife slipped and lacerated the ulnar aspect of the distal phalanx of the left thumb.  There is no distal loss of function or anesthesia.  Last tetanus shot was in 2018.    Allergies:  No Known Allergies    Problem List:    Patient Active Problem List    Diagnosis Date Noted     Lumbar stenosis 10/20/2015     Priority: Medium     Mild major depression (H) 03/21/2013     Priority: Medium     BPH (benign prostatic hyperplasia) 03/21/2013     Priority: Medium     Sciatic nerve pain 01/28/2013     Priority: Medium     Advanced directives, counseling/discussion 01/17/2013     Priority: Medium     Discussed advance care planning with patient; information given to patient to review. 1/17/2013          Low back pain radiating to both legs 07/16/2012     Priority: Medium     Intervertebral lumbar disc disorder with myelopathy, lumbar region 07/05/2012     Priority: Medium     Hypertension goal BP (blood pressure) < 140/90 07/12/2011     Priority: Medium     CARDIOVASCULAR SCREENING; LDL GOAL LESS THAN 130 02/10/2011     Priority: Medium     Myalgia and myositis 04/23/2002     Priority: Medium     Problem list name updated by automated process. Provider to review       Esophageal reflux 04/08/2002     Priority: Medium     Attention deficit hyperactivity disorder (ADHD) 04/08/2002     Priority: Medium     Problem list name updated by automated process. Provider to review          Past Medical History:    Past Medical History:   Diagnosis Date     Backache, unspecified      Depressive disorder, not elsewhere classified 1979     Depressive disorder, not elsewhere classified 12/18/01     Hernia of unspecified site of abdominal cavity without mention of  obstruction or gangrene 08/07/1998       Past Surgical History:    Past Surgical History:   Procedure Laterality Date     C APPENDECTOMY      5 years of age     C NONSPECIFIC PROCEDURE  1983    neck fusion     COLONOSCOPY N/A 9/23/2015    Procedure: COLONOSCOPY;  Surgeon: Juan Francisco Cornejo MD;  Location: [facility]     HC RECONSTRUCT TENDON GOLDEN EA, W LOCAL TISSUES  08/07/1998    A-1 pulley release, right fourth finger     HC UGI ENDOSCOPY DIAG W OR W/O BRUSH/WASH  2002    bx. pending  - use proton pump inhibitor indefinitely     HEMILAMINECTOMY, DISCECTOMY LUMBAR TWO LEVELS, COMBINED Right 10/21/2015    Procedure: COMBINED HEMILAMINECTOMY, DISCECTOMY LUMBAR TWO LEVELS;  Surgeon: Jesus Ornelas MD;  Location: [facility]     INJECT EPIDURAL LUMBAR  11/14/13,2/10/14    [facility]     INJECT EPIDURAL LUMBAR  5/13/14,8/5/14    [facility]     INJECT EPIDURAL LUMBAR  12/15/14    [facility]       Family History:    Family History   Problem Relation Age of Onset     Cancer Mother      Cancer Father      Cancer Brother      Cancer Sister      C.A.D. No family hx of        Social History:  Marital Status:   [5]  Social History     Tobacco Use     Smoking status: Never Smoker     Smokeless tobacco: Never Used     Tobacco comment: no smoking in the home   Substance Use Topics     Alcohol use: Yes     Comment: rare     Drug use: No        Medications:    acetaminophen (TYLENOL) 325 MG tablet  carvedilol (COREG) 6.25 MG tablet  cimetidine (TAGAMET) 400 MG tablet  diclofenac (VOLTAREN) 1 % topical gel  guanFACINE (TENEX) 1 MG tablet  hydrOXYzine (ATARAX) 25 MG tablet  losartan (COZAAR) 100 MG tablet          Review of Systems    Further problem focused system review negative.    Physical Exam   BP: 129/87  Pulse: 81  Temp: 97  F (36.1  C)  Resp: 18  Weight: 84.4 kg (186 lb)  SpO2: 99 %      Physical Exam  Healthy appearing 64-year-old in no distress.  Examination reveals a 2 cm laceration of the ulnar  aspect of the distal phalanx of the left thumb that extends from the IP joint to the midportion of the nail laterally and does not involve the nailbed.  It is through the full-thickness of the skin but not in deeper structures.  There is no contamination or foreign body.  ED Course        [facility]    -Laceration Repair    Date/Time: 5/13/2020 4:16 PM  Performed by: Quincy Redding MD  Authorized by: Quincy Redding MD       ANESTHESIA (see MAR for exact dosages):     Anesthesia method:  Nerve block    Block location:  Thumb digital block    Block needle gauge:  30 G    Block anesthetic:  Bupivacaine 0.25% w/o epi    Block outcome:  Anesthesia achieved      LACERATION DETAILS     Location:  Finger    Finger location:  L thumb    Length (cm):  2    REPAIR TYPE:     Repair type:  Simple      EXPLORATION:     Contaminated: no      TREATMENT:     Area cleansed with:  Soap and water    Amount of cleaning:  Standard    Irrigation solution:  Sterile saline    SKIN REPAIR     Repair method:  Sutures    Suture size:  4-0    Suture material:  Nylon    Suture technique:  Simple interrupted    Number of sutures:  6    POST-PROCEDURE DETAILS     Dressing:  Antibiotic ointment and non-adherent dressing      PROCEDURE   Patient Tolerance:  Patient tolerated the procedure well with no immediate complications                     Critical Care time:  none               No results found for this or any previous visit (from the past 24 hour(s)).    Medications - No data to display    Assessments & Plan (with Medical Decision Making)     64-year-old male presents with laceration of the left thumb as above.  This was cleaned and closed as above.  Wound care instructions were reviewed.  Sutures out in 10 days.  Be seen if signs of infection which were reviewed with him.    I have reviewed the nursing notes.    I have reviewed the findings, diagnosis, plan and need for follow up with the patient.       New  Prescriptions    No medications on file       Final diagnoses:   Laceration of left thumb without foreign body without damage to nail, initial encounter       5/13/2020   [facility] EMERGENCY DEPARTMENT     Quincy Redding MD  05/13/20 [id number]

## 2020-05-13 NOTE — ED AVS SNAPSHOT
[facility] Emergency Department  [address] [address]  Phone:  [phone]  Fax:  [phone]                                    Brcye Diaz   MRN: [id number]    Department:  [facility] Emergency Department   Date of Visit:  5/13/2020           After Visit Summary Signature Page    I have received my discharge instructions, and my questions have been answered. I have discussed any challenges I see with this plan with the nurse or doctor.    ..........................................................................................................................................  Patient/Patient Representative Signature      ..........................................................................................................................................  Patient Representative Print Name and Relationship to Patient    ..................................................               ................................................  Date                                   Time    ..........................................................................................................................................  Reviewed by Signature/Title    ...................................................              ..............................................  Date                                               Time          22EPIC Rev 08/18

## 2020-05-13 NOTE — ED NOTES
Pt with laceration to left thumb by nail. Was sanding and used knife to cut sanding paper and cut thumb. Bleeding controlled. Tdap up to date.

## 2020-05-14 ENCOUNTER — TELEPHONE (OUTPATIENT)
Dept: FAMILY MEDICINE | Facility: CLINIC | Age: 65
End: 2020-05-14

## 2020-05-14 NOTE — TELEPHONE ENCOUNTER
Reason for Call:  Procedure  reached out to schedule diagnostic upper gi endoscopy    Detailed comments: patient is going to wait until COVID over to schedule    No further action necessary for procedure  at this time    Phone Number Patient can be reached at: Home number on file [phone] (home)    Best Time: NA    Can we leave a detailed message on this number? Not Applicable    Call taken on 5/14/2020 at 2:53 PM by Ileana Murray

## 2020-05-15 ENCOUNTER — TELEPHONE (OUTPATIENT)
Dept: FAMILY MEDICINE | Facility: CLINIC | Age: 65
End: 2020-05-15

## 2020-05-15 NOTE — TELEPHONE ENCOUNTER
Reason for Call:  Procedure  has reached out to schedule diagnostic upper gi endoscopy    Detailed comments: patient states that he wants to wait until after pandemic    No further action necessary for procedure  at this time    Phone Number Patient can be reached at: Home number on file [phone] (home)    Best Time: NA    Can we leave a detailed message on this number? Not Applicable    Call taken on 5/15/2020 at 12:38 PM by Ileana Murray

## 2020-06-22 DIAGNOSIS — I10 HYPERTENSION GOAL BP (BLOOD PRESSURE) < 140/90: ICD-10-CM

## 2020-06-23 RX ORDER — LOSARTAN POTASSIUM 100 MG/1
TABLET ORAL
Qty: 90 TABLET | Refills: 0 | Status: SHIPPED | OUTPATIENT
Start: 2020-06-23 | End: 2020-09-28

## 2020-06-28 NOTE — DISCHARGE INSTRUCTIONS
Keep dry until morning, then wash gently, normally, with soap and water. Pat dry. Then apply vaseline and bandage. Change dressing twice daily. Be seen if signs of infection--pain, redness, swelling. Sutures out in 10 days.     No

## 2020-09-01 DIAGNOSIS — K21.00 GASTROESOPHAGEAL REFLUX DISEASE WITH ESOPHAGITIS: ICD-10-CM

## 2020-09-01 RX ORDER — CIMETIDINE 400 MG
TABLET ORAL
Qty: 60 TABLET | Refills: 0 | Status: SHIPPED | OUTPATIENT
Start: 2020-09-01 | End: 2020-10-08

## 2020-09-01 NOTE — TELEPHONE ENCOUNTER
Routing refill request to provider for review/approval because:  Drug interaction warning  PCP out of clinic this week. This was ordered 4/24/2020:  EGD recommended with ongoing GERD symptoms  Begin Tagamet 400 mg twice daily as needed for ongoing symptoms    Does not seem that pt has set up an appt with GI.    Margo HUSSEIN RN, BSN

## 2020-09-15 DIAGNOSIS — I10 HYPERTENSION GOAL BP (BLOOD PRESSURE) < 140/90: ICD-10-CM

## 2020-09-15 RX ORDER — CARVEDILOL 6.25 MG/1
TABLET ORAL
Qty: 180 TABLET | Refills: 1 | Status: SHIPPED | OUTPATIENT
Start: 2020-09-15 | End: 2021-03-04

## 2020-09-25 DIAGNOSIS — I10 HYPERTENSION GOAL BP (BLOOD PRESSURE) < 140/90: ICD-10-CM

## 2020-09-28 RX ORDER — LOSARTAN POTASSIUM 100 MG/1
TABLET ORAL
Qty: 90 TABLET | Refills: 0 | Status: SHIPPED | OUTPATIENT
Start: 2020-09-28 | End: 2020-12-31

## 2020-09-28 NOTE — TELEPHONE ENCOUNTER
"Requested Prescriptions   Pending Prescriptions Disp Refills     losartan (COZAAR) 100 MG tablet [Pharmacy Med Name: LOSARTAN POT 100MG] 90 tablet 0     Sig: TAKE 1 TABLET BY MOUTH ONCE DAILY       Angiotensin-II Receptors Failed - 9/25/2020  5:40 PM        Failed - Normal serum creatinine on file in past 12 months     Recent Labs   Lab Test 03/20/19 0927   CR 0.78       Ok to refill medication if creatinine is low          Failed - Normal serum potassium on file in past 12 months     Recent Labs   Lab Test 03/20/19 0927   POTASSIUM 4.5                    Passed - Last blood pressure under 140/90 in past 12 months     BP Readings from Last 3 Encounters:   05/13/20 125/85   04/24/20 120/82   09/10/19 124/82                 Passed - Recent (12 mo) or future (30 days) visit within the authorizing provider's specialty     Patient has had an office visit with the authorizing provider or a provider within the authorizing providers department within the previous 12 mos or has a future within next 30 days. See \"Patient Info\" tab in inbasket, or \"Choose Columns\" in Meds & Orders section of the refill encounter.              Passed - Medication is active on med list        Passed - Patient is age 18 or older             "

## 2020-09-28 NOTE — TELEPHONE ENCOUNTER
Due for yearly HTN recheck.  Refilled medication today.   Thanks Lenore Cornejo [facility]

## 2020-11-20 DIAGNOSIS — K21.9 GASTROESOPHAGEAL REFLUX DISEASE WITHOUT ESOPHAGITIS: ICD-10-CM

## 2020-11-23 RX ORDER — CIMETIDINE 400 MG
TABLET ORAL
Qty: 60 TABLET | Refills: 0 | Status: SHIPPED | OUTPATIENT
Start: 2020-11-23 | End: 2021-01-05

## 2020-11-24 DIAGNOSIS — K21.9 GASTROESOPHAGEAL REFLUX DISEASE WITHOUT ESOPHAGITIS: ICD-10-CM

## 2020-11-25 RX ORDER — CIMETIDINE 400 MG
400 TABLET ORAL 2 TIMES DAILY
Qty: 60 TABLET | Refills: 0 | OUTPATIENT
Start: 2020-11-25

## 2020-11-25 NOTE — TELEPHONE ENCOUNTER
Pepcid is back in stock, the 20mg at least, can we get this switched to that? This is too expensive every month. Thanks!

## 2020-11-26 NOTE — TELEPHONE ENCOUNTER
Patient informed and appointment made for 1/5/21.     Madelyn Birmingham LPN........11/25/2020 6:25 PM

## 2020-12-28 DIAGNOSIS — I10 HYPERTENSION GOAL BP (BLOOD PRESSURE) < 140/90: ICD-10-CM

## 2020-12-31 RX ORDER — LOSARTAN POTASSIUM 100 MG/1
100 TABLET ORAL DAILY
Qty: 90 TABLET | Refills: 0 | Status: SHIPPED | OUTPATIENT
Start: 2020-12-31 | End: 2021-03-04

## 2021-01-04 DIAGNOSIS — K21.9 GASTROESOPHAGEAL REFLUX DISEASE WITHOUT ESOPHAGITIS: ICD-10-CM

## 2021-01-05 RX ORDER — CIMETIDINE 400 MG
400 TABLET ORAL 2 TIMES DAILY
Qty: 60 TABLET | Refills: 0 | Status: SHIPPED | OUTPATIENT
Start: 2021-01-05 | End: 2021-03-01

## 2021-02-18 ENCOUNTER — TELEPHONE (OUTPATIENT)
Dept: FAMILY MEDICINE | Facility: CLINIC | Age: 66
End: 2021-02-18

## 2021-02-18 DIAGNOSIS — K21.9 GASTROESOPHAGEAL REFLUX DISEASE WITHOUT ESOPHAGITIS: ICD-10-CM

## 2021-02-18 NOTE — LETTER
[facility]  [address] [place] [id number]  [phone]        February 19, 2021  Bryce Diaz  [address] [address]    Dear Bryce,    I care about your health and have reviewed your health plan. I have reviewed your medical conditions, medication list, and lab results and am making recommendations based on this review, to better manage your health.    You are in particular need of attention regarding:  -Wellness (Physical) Visit     I am recommending that you:  -schedule a WELLNESS (Physical) APPOINTMENT with me.        Please call us at [phone] (or use [employer]) to address the above recommendations.     Thank you for trusting [facility] and we appreciate the opportunity to serve you.  We look forward to supporting your healthcare needs in the future.      Healthy Regards,        Lenore Cornejo, APRN CNP/Margo HUSSEIN RN, BSN

## 2021-02-19 RX ORDER — CIMETIDINE 400 MG
TABLET ORAL
Qty: 60 TABLET | Refills: 0 | OUTPATIENT
Start: 2021-02-19

## 2021-02-19 NOTE — TELEPHONE ENCOUNTER
Pt needs an appt. He was a NO SHOW for 1/5/21 office visit. Last OV 4/24/2020. He can buy OTC until seen.  Letter mailed.    Margo HUSSEIN RN, BSN

## 2021-03-01 ENCOUNTER — TELEPHONE (OUTPATIENT)
Dept: FAMILY MEDICINE | Facility: CLINIC | Age: 66
End: 2021-03-01

## 2021-03-01 DIAGNOSIS — K21.9 GASTROESOPHAGEAL REFLUX DISEASE WITHOUT ESOPHAGITIS: ICD-10-CM

## 2021-03-01 RX ORDER — CIMETIDINE 400 MG
400 TABLET ORAL 2 TIMES DAILY
Qty: 14 TABLET | Refills: 0 | Status: SHIPPED | OUTPATIENT
Start: 2021-03-01 | End: 2021-10-12 | Stop reason: ALTCHOICE

## 2021-03-01 NOTE — TELEPHONE ENCOUNTER
Pt is requesting ASAP as Heart Burn is worse. Requesting to be filled today. Pharmacy is open until 6 pm.  Please Advise

## 2021-03-01 NOTE — TELEPHONE ENCOUNTER
Last OV 04-24-20.  Tagamet LR 01-05-21 qty 60.   Has appt with Lenore 03-04-21.  LM Rx refilled x1 wk, further refills at upcoming appt.  LUIZ Husain RN

## 2021-03-04 ENCOUNTER — OFFICE VISIT (OUTPATIENT)
Dept: FAMILY MEDICINE | Facility: CLINIC | Age: 66
End: 2021-03-04
Payer: COMMERCIAL

## 2021-03-04 VITALS
WEIGHT: 181 LBS | DIASTOLIC BLOOD PRESSURE: 80 MMHG | TEMPERATURE: 97.3 F | BODY MASS INDEX: 28.41 KG/M2 | RESPIRATION RATE: 14 BRPM | HEART RATE: 77 BPM | HEIGHT: 67 IN | SYSTOLIC BLOOD PRESSURE: 124 MMHG | OXYGEN SATURATION: 97 %

## 2021-03-04 DIAGNOSIS — R35.1 NOCTURIA: ICD-10-CM

## 2021-03-04 DIAGNOSIS — K21.9 GASTROESOPHAGEAL REFLUX DISEASE WITHOUT ESOPHAGITIS: ICD-10-CM

## 2021-03-04 DIAGNOSIS — Z00.00 ROUTINE GENERAL MEDICAL EXAMINATION AT A HEALTH CARE FACILITY: Primary | ICD-10-CM

## 2021-03-04 DIAGNOSIS — I10 HYPERTENSION GOAL BP (BLOOD PRESSURE) < 140/90: ICD-10-CM

## 2021-03-04 DIAGNOSIS — H91.92 HEARING LOSS OF LEFT EAR, UNSPECIFIED HEARING LOSS TYPE: ICD-10-CM

## 2021-03-04 LAB
ALBUMIN SERPL-MCNC: 3.8 G/DL (ref 3.4–5)
ALBUMIN UR-MCNC: NEGATIVE MG/DL
ALP SERPL-CCNC: 92 U/L (ref 40–150)
ALT SERPL W P-5'-P-CCNC: 26 U/L (ref 0–70)
ANION GAP SERPL CALCULATED.3IONS-SCNC: 5 MMOL/L (ref 3–14)
APPEARANCE UR: CLEAR
AST SERPL W P-5'-P-CCNC: 15 U/L (ref 0–45)
BASOPHILS # BLD AUTO: 0 10E9/L (ref 0–0.2)
BASOPHILS NFR BLD AUTO: 0.6 %
BILIRUB SERPL-MCNC: 0.2 MG/DL (ref 0.2–1.3)
BILIRUB UR QL STRIP: NEGATIVE
BUN SERPL-MCNC: 37 MG/DL (ref 7–30)
CALCIUM SERPL-MCNC: 9.5 MG/DL (ref 8.5–10.1)
CHLORIDE SERPL-SCNC: 108 MMOL/L (ref 94–109)
CO2 SERPL-SCNC: 27 MMOL/L (ref 20–32)
COLOR UR AUTO: YELLOW
CREAT SERPL-MCNC: 1.08 MG/DL (ref 0.66–1.25)
DIFFERENTIAL METHOD BLD: ABNORMAL
EOSINOPHIL # BLD AUTO: 0.3 10E9/L (ref 0–0.7)
EOSINOPHIL NFR BLD AUTO: 3.7 %
ERYTHROCYTE [DISTWIDTH] IN BLOOD BY AUTOMATED COUNT: 13 % (ref 10–15)
GFR SERPL CREATININE-BSD FRML MDRD: 72 ML/MIN/{1.73_M2}
GLUCOSE SERPL-MCNC: 89 MG/DL (ref 70–99)
GLUCOSE UR STRIP-MCNC: NEGATIVE MG/DL
HCT VFR BLD AUTO: 36.1 % (ref 40–53)
HGB BLD-MCNC: 12.1 G/DL (ref 13.3–17.7)
HGB UR QL STRIP: NEGATIVE
KETONES UR STRIP-MCNC: NEGATIVE MG/DL
LDLC SERPL DIRECT ASSAY-MCNC: 133 MG/DL
LEUKOCYTE ESTERASE UR QL STRIP: NEGATIVE
LYMPHOCYTES # BLD AUTO: 2.3 10E9/L (ref 0.8–5.3)
LYMPHOCYTES NFR BLD AUTO: 32.2 %
MCH RBC QN AUTO: 30.7 PG (ref 26.5–33)
MCHC RBC AUTO-ENTMCNC: 33.5 G/DL (ref 31.5–36.5)
MCV RBC AUTO: 92 FL (ref 78–100)
MONOCYTES # BLD AUTO: 0.8 10E9/L (ref 0–1.3)
MONOCYTES NFR BLD AUTO: 10.4 %
NEUTROPHILS # BLD AUTO: 3.8 10E9/L (ref 1.6–8.3)
NEUTROPHILS NFR BLD AUTO: 53.1 %
NITRATE UR QL: NEGATIVE
PH UR STRIP: 5.5 PH (ref 5–7)
PLATELET # BLD AUTO: 243 10E9/L (ref 150–450)
POTASSIUM SERPL-SCNC: 4.2 MMOL/L (ref 3.4–5.3)
PROT SERPL-MCNC: 7.7 G/DL (ref 6.8–8.8)
PSA SERPL-ACNC: 5.53 UG/L (ref 0–4)
RBC # BLD AUTO: 3.94 10E12/L (ref 4.4–5.9)
SODIUM SERPL-SCNC: 140 MMOL/L (ref 133–144)
SOURCE: NORMAL
SP GR UR STRIP: 1.02 (ref 1–1.03)
UROBILINOGEN UR STRIP-ACNC: 0.2 EU/DL (ref 0.2–1)
WBC # BLD AUTO: 7.2 10E9/L (ref 4–11)

## 2021-03-04 PROCEDURE — 99396 PREV VISIT EST AGE 40-64: CPT | Performed by: NURSE PRACTITIONER

## 2021-03-04 PROCEDURE — 81003 URINALYSIS AUTO W/O SCOPE: CPT | Performed by: NURSE PRACTITIONER

## 2021-03-04 PROCEDURE — 83721 ASSAY OF BLOOD LIPOPROTEIN: CPT | Performed by: NURSE PRACTITIONER

## 2021-03-04 PROCEDURE — 36415 COLL VENOUS BLD VENIPUNCTURE: CPT | Performed by: NURSE PRACTITIONER

## 2021-03-04 PROCEDURE — 85025 COMPLETE CBC W/AUTO DIFF WBC: CPT | Performed by: NURSE PRACTITIONER

## 2021-03-04 PROCEDURE — G0103 PSA SCREENING: HCPCS | Performed by: NURSE PRACTITIONER

## 2021-03-04 PROCEDURE — 80053 COMPREHEN METABOLIC PANEL: CPT | Performed by: NURSE PRACTITIONER

## 2021-03-04 PROCEDURE — 99214 OFFICE O/P EST MOD 30 MIN: CPT | Mod: 25 | Performed by: NURSE PRACTITIONER

## 2021-03-04 RX ORDER — CIMETIDINE 400 MG
400 TABLET ORAL 2 TIMES DAILY
Qty: 14 TABLET | Refills: 0 | Status: CANCELLED | OUTPATIENT
Start: 2021-03-04

## 2021-03-04 RX ORDER — OMEPRAZOLE 40 MG/1
40 CAPSULE, DELAYED RELEASE ORAL DAILY
Qty: 90 CAPSULE | Refills: 1 | Status: SHIPPED | OUTPATIENT
Start: 2021-03-04 | End: 2021-05-07

## 2021-03-04 RX ORDER — CARVEDILOL 6.25 MG/1
6.25 TABLET ORAL 2 TIMES DAILY
Qty: 180 TABLET | Refills: 1 | Status: SHIPPED | OUTPATIENT
Start: 2021-03-04 | End: 2021-10-12

## 2021-03-04 RX ORDER — LOSARTAN POTASSIUM 100 MG/1
100 TABLET ORAL DAILY
Qty: 90 TABLET | Refills: 1 | Status: SHIPPED | OUTPATIENT
Start: 2021-03-04 | End: 2021-03-07

## 2021-03-04 ASSESSMENT — MIFFLIN-ST. JEOR: SCORE: 1574.64

## 2021-03-04 ASSESSMENT — ENCOUNTER SYMPTOMS
EYE PAIN: 0
MYALGIAS: 1
FEVER: 0
WEAKNESS: 1
HEMATURIA: 0
ABDOMINAL PAIN: 0
CHILLS: 0
FREQUENCY: 1
DIZZINESS: 0
CONSTIPATION: 0
NERVOUS/ANXIOUS: 0
SHORTNESS OF BREATH: 1
PALPITATIONS: 0
DYSURIA: 0
PARESTHESIAS: 0
HEADACHES: 0
HEARTBURN: 1
COUGH: 1
SORE THROAT: 0
DIARRHEA: 0
NAUSEA: 0
ARTHRALGIAS: 1
JOINT SWELLING: 1
HEMATOCHEZIA: 0

## 2021-03-04 ASSESSMENT — ANXIETY QUESTIONNAIRES
GAD7 TOTAL SCORE: 4
5. BEING SO RESTLESS THAT IT IS HARD TO SIT STILL: NOT AT ALL
3. WORRYING TOO MUCH ABOUT DIFFERENT THINGS: NOT AT ALL
1. FEELING NERVOUS, ANXIOUS, OR ON EDGE: NOT AT ALL
7. FEELING AFRAID AS IF SOMETHING AWFUL MIGHT HAPPEN: SEVERAL DAYS
4. TROUBLE RELAXING: SEVERAL DAYS
2. NOT BEING ABLE TO STOP OR CONTROL WORRYING: SEVERAL DAYS
7. FEELING AFRAID AS IF SOMETHING AWFUL MIGHT HAPPEN: SEVERAL DAYS
GAD7 TOTAL SCORE: 4
GAD7 TOTAL SCORE: 4
6. BECOMING EASILY ANNOYED OR IRRITABLE: SEVERAL DAYS

## 2021-03-04 ASSESSMENT — PATIENT HEALTH QUESTIONNAIRE - PHQ9
10. IF YOU CHECKED OFF ANY PROBLEMS, HOW DIFFICULT HAVE THESE PROBLEMS MADE IT FOR YOU TO DO YOUR WORK, TAKE CARE OF THINGS AT HOME, OR GET ALONG WITH OTHER PEOPLE: SOMEWHAT DIFFICULT
SUM OF ALL RESPONSES TO PHQ QUESTIONS 1-9: 7
SUM OF ALL RESPONSES TO PHQ QUESTIONS 1-9: 7

## 2021-03-04 NOTE — PATIENT INSTRUCTIONS
Preventive Health Recommendations  Male Ages 50 - 64    Yearly exam:             See your health care provider every year in order to  o   Review health changes.   o   Discuss preventive care.    o   Review your medicines if your doctor has prescribed any.     Have a cholesterol test every 5 years, or more frequently if you are at risk for high cholesterol/heart disease.     Have a diabetes test (fasting glucose) every three years. If you are at risk for diabetes, you should have this test more often.     Have a colonoscopy at age 50, or have a yearly FIT test (stool test). These exams will check for colon cancer.      Talk with your health care provider about whether or not a prostate cancer screening test (PSA) is right for you.    You should be tested each year for STDs (sexually transmitted diseases), if you re at risk.     Shots: Get a flu shot each year. Get a tetanus shot every 10 years.     Nutrition:    Eat at least 5 servings of fruits and vegetables daily.     Eat whole-grain bread, whole-wheat pasta and brown rice instead of white grains and rice.     Get adequate Calcium and Vitamin D.     Lifestyle    Exercise for at least 150 minutes a week (30 minutes a day, 5 days a week). This will help you control your weight and prevent disease.     Limit alcohol to one drink per day.     No smoking.     Wear sunscreen to prevent skin cancer.     See your dentist every six months for an exam and cleaning.     See your eye doctor every 1 to 2 years.    Patient Education     What Is Arthritis?  Arthritis is a disease that affects the joints. Joints are the parts where bones meet and move. It can affect any joint in your body. There are more than 100 types of arthritis. They include:      Osteoarthritis    Rheumatoid arthritis    Gout    Lupus  If your symptoms are mild, medicines may be enough to ease pain and swelling. For more severe arthritis, you may need surgery. This can improve the condition of the joint.  Or it can replace part or all of the joint.       What causes arthritis?  Cartilage is a smooth substance that protects the ends of your bones and provides cushioning. When you have arthritis, this cartilage breaks down and can no longer protect your bones. This can happen from an autoimmune disease or it can happen from wear and tear, infections, or trauma. The bones rub against each other, causing pain and swelling. Over time, small pieces of rough or splintered bone (bone spurs) may develop. The joint's range of motion can become limited.   Symptoms  Some of the more common symptoms of arthritis include:     Joint pain and stiffness. These symptoms get worse with long periods of rest. They may also get worse from using a joint too long or too hard.    Joints that have lost normal shape and motion. They may look swollen and be hard to move.    Sore, inflamed joints. They may look red and feel warm.    Grinding or popping noise. This happens with joint movement.    Tiredness (fatigue). You may feel tired all the time.  Reducing symptoms  You can help ease symptoms in these ways:    Losing weight    Exercising    Strengthening muscles around the joint to reduce the strain on the joint    Using hot and cold packs on your joints    Using over-the-counter and prescription medicines  Talk with your healthcare provider about the best treatments for your condition.   [employer] last reviewed this educational content on 7/1/2019 2000-2020 [employer]. All rights reserved. This information is not intended as a substitute for professional medical care. Always follow your healthcare professional's instructions.           Patient Education     How to Manage Your Osteoarthritis  Osteoarthritis is also called degenerative joint disease. It happens when the cartilage in a joint breaks down. Cartilage allows the bones in a joint to glide over one another. When the cartilage breaks down, the bones rub together. This  causes pain, swelling, and loss of motion. Osteoarthritis most often affects the hands, neck, lower back, knees, and hips.   Your healthcare provider can help you find ways to reduce pain, move better, and protect your joints from further injury. Changes in your daily activities can also help. These changes may include weight management, exercise, pain control, joint protection, and medicine. If these don t help, surgery may be an option.   Weight management  Extra weight can put stress on your joints and increase pain. This happens most often in the weight-bearing joints, such as your hips, knees, and ankles.   Weight loss is not easy, but even losing a small amount of weight can help. Talk with your healthcare provider about ways to lose weight. Diet changes and exercise can help. A dietitian or nutritionist can help you with eating healthy. In some cases, medicine or weight loss surgery may help.   Exercise  Exercise is an important part of managing your osteoarthritis. Exercise strengthens the muscles that support your joints. It also lessens joint pain and stiffness. And, it helps to improve your overall health. You should try to do a variety of exercises to build strength and improve your lung and heart health. All exercises burn calories and can help you lose weight. It's critical to talk with your healthcare provider before starting an exercise routine. Your healthcare provider can help you determine what type of exercise routine and intensity level is best for you. Your healthcare provider may also be able to give you handouts on exercise techniques or refer you to a physical therapist to learn the best exercise routine for your needs.      A man uses resistance band to strengthen his shoulders and arms.     The types of exercise are:    Strengthening exercises. These can be done with exercise bands or resistance bands (inexpensive exercise aids), or with weights.    Aerobic activities. These exercises keep  your heart and lungs strong. Moderate aerobic activity for 30 minutes most days of the week is recommended. You can even break it up into 3, 10-minute increments. Activities such as swimming, walking, cycling are good choices.    Range of motion/stretching activities. These can lessen pain and stiffness and help you move better.    Balance exercises. These help you maintain balance and improve your daily living. Yoga and lara chi are excellent examples.  Start exercising slowly each time by gently moving your joints. Warm up for at least 5 to 10 minutes before any exercise. Talk with your healthcare provider:     Before starting an exercise program or adding new exercises to your daily routine    When a joint becomes painful or swollen    About taking pain medicine or using ice or heat before or after you exercise  Joint protection  To lessen pain and protect your joints from further damage you should:    Balance rest with activity. It s important to be active and to exercise every day. But you should rest in between periods of activity.    Take care of your joints. There are things you do every day that can make your joint symptoms worse. There may be better ways of doing those same things without causing further joint stress. For example:  ? Store heavy kitchen items at waist height so that you can easily get to them.  ? Use aids such as long-handled graspers and jar-openers.    Recognize pain. If your joints hurt more than usual, you may have done too much.    Sleep. It s important to get enough sleep each night. Sleep gives you energy to be active during the day. It also helps you to feel better overall. If you are having trouble sleeping, or don t feel rested when you wake up, talk with your healthcare provider.  Medicine  Over-the-counter and prescription medicines can help reduce the pain and stiffness from osteoarthritis. They include:     Pills, medicine that is rubbed on the skin (topical), and injections  into the joint    Pain relievers, such as acetaminophen, and nonsteroidal anti-inflammatory drugs (NSAIDs), such as ibuprofen  It's very important that you talk with your healthcare provider before taking any medicine for arthritis. Even medicine that are available without prescription can cause serious side effects. Some can make other health problems worse and interfere with other medicines.   Each person reacts differently to these medicines. If one medicine doesn't work for you, your provider may prescribe a different one.   Pain control  Medicines can help control most arthritis pain. But you can also try:    Relaxation methods. Deep breathing, yoga, or easy stretching can help.    Cold and heat. Applying ice packs, moist heat, or hot showers and baths can help lessen pain and stiffness. Ask your healthcare provider what he or she suggests.    Other methods. Massage, acupuncture, and a small device that delivers light impulses to the nerves (TENS or transcutaneous electrical nerve stimulation) can also help.  Surgery  If other treatments don t work for your arthritis, you may need surgery.    Arthroscopic surgery. During arthroscopic surgery, the healthcare provider uses a special tool called an arthroscope to see and repair damaged areas inside your joint. It may be done to remove loose or damaged cartilage and bone. It can also be used to smooth or reposition bones.    Joint replacement surgery. Joint replacement surgery is when the damaged joint is replaced with a new man-made joint. The knee and hip joints are replaced most often.  Talk with your healthcare provider about the benefits and risks of these procedures.  There are many ways to handle the pain of osteoarthritis. Work with your healthcare provider to figure out what s best for you.     6944-1175 [employer]. All rights reserved. This information is not intended as a substitute for professional medical care. Always follow your  healthcare professional's instructions.           Patient Education     Living with Osteoarthritis    Osteoarthritis is a chronic disease and the most common type of arthritis. But it doesn t have to keep you from leading an active life. You can help control symptoms by exercising and losing weight if you are overweight. Using special tools also helps make life easier. Be sure to see your healthcare provider for scheduled checkups and lab work. If you have questions or concerns between office visits, call your healthcare provider's office.  Make exercise part of your life  Gentle exercise can help lessen your pain. Keep the following in mind:    Choose exercises that improve joint motion and make your muscles stronger. Your healthcare provider or a physical therapist may suggest a few.    Stretching and flexibility activities such as yoga and lara chi may improve pain and joint motion.    Try low-impact sports, such as walking, biking, or doing exercises in a warm pool.    Most people should exercise for at least 30 minutes a day on most days of the week. This can be broken up into shorter periods throughout the day.    Don t push yourself too hard at first. Slowly build up over time.    Make sure you warm up for 5 to 10 minutes before you exercise.    If pain and stiffness increase, don't exercise as hard or as long.  Watch your weight  If you weigh more than you should, your weight-bearing joints are under extra pressure. This makes your symptoms worse. To reduce pain and stiffness, try losing a few of those extra pounds (kilograms). The tips below may help:    Start a weight-loss program with the help of your healthcare provider.    Ask your friends and family for support.    Join a weight-loss group.  Use special tools  Even simple tasks can be hard to do when your joints hurt. Special tools called assistive devices can make things easier by reducing strain and protecting your joints. Ask your healthcare provider  where to find these and other helpful tools:    Long-handled reachers or grabbers    Jar openers and button threaders    Large  for pencils, garden tools, and other handheld objects  Use mobility and other aids  People with arthritis and other joint problems often use mobility aids to help with walking. For example, they may use canes or walkers. They may also use splints or braces to support joints. Talk with your healthcare provider or physical therapist about these aids:    A cane to reduce knee or hip pain and help prevent falls    Splints for your wrists or other joints    A brace to support a weak knee joint    Orthotics for toe and foot involvement  Medical and surgical treatments  Discuss medical treatments with your healthcare provider to help reduce your pain and improve joint mobility. Treatments may include:    Topical medicines such as lidocaine, capsaicin, and diclofenac gel    Oral medicines such as acetaminophen, NSAIDs (nonsteroidal anti-inflammatory drugs) such as ibuprofen and naproxen, or opioids    Injections in affected joints such as corticosteroids in various joints, or hyaluronic acid in the knee joints    Surgical repair or surgical joint replacement with artificial joints    Complementary therapies such as heat and cold treatment, massage, acupuncture, supplements, cognitive training, meditation, and others. Discuss these options with your healthcare provider.  [employer] last reviewed this educational content on 6/1/2018 2000-2020 [employer]. All rights reserved. This information is not intended as a substitute for professional medical care. Always follow your healthcare professional's instructions.           Patient Education     What Is Osteoarthritis?  There are about 100 different types of arthritis. In general, arthritis means problems with the joints. A joint is a point in the body where 2 or more bones come together. Arthritis may also cause problems in the  tissue near the joints, including muscles, tendons, and ligaments. And, in some types of arthritis, the entire body can be affected.   Osteoarthritis (OA) is sometimes called degenerative joint disease, or wear-and-tear arthritis. It's the most common type of arthritis. In OA, the cartilage wears away. Cartilage is a slick tissue that covers the ends of the bones. It acts as a cushion and allows them to glide smoothly against each other. When the cartilage wears away, bone rubs against bone. This causes pain, swelling, stiffness, and difficulty moving. Risk factors for developing OA include obesity, being older than 40, past joint trauma, concomitant inflammatory arthritis, repetitive joint use, and a family history of OA.      Normal knee      Knee with osteoarthritis   Symptoms  OA can affect any joint. Weight-bearing joints, such as the hips and knees, are often affected. Common symptoms are joint pain and stiffness. Pain and stiffness may get worse with inactivity or overuse. For example, you may have more stiffness first thing in the morning, usually for less than 30 minutes. Or you may have stiffness after sitting for a long time. This might be while sitting at a movie. You may also have more pain in your hips or knees if you walk farther than you normally do.   Other common symptoms are:    Weak muscles    Unstable or wobbly joints    Grinding or crackling noises with motion    Joints with swelling or bumps    Unable to bend and straighten joints (reduced range of motion)  If you have any of these joint changes, make an appointment to see your healthcare provider. The two of you can work together to create a treatment plan that may help lessen your pain and stiffness and prevent symptoms from getting worse.   [employer] last reviewed this educational content on 3/1/2020    4967-4213 [employer]. All rights reserved. This information is not intended as a substitute for professional medical care.  Always follow your healthcare professional's instructions.

## 2021-03-04 NOTE — H&P (VIEW-ONLY)
SUBJECTIVE:   CC: Bryce Diaz is an 65 year old male who presents for preventative health visit.       Patient has been advised of split billing requirements and indicates understanding:   Healthy Habits:     Getting at least 3 servings of Calcium per day:  Yes    Bi-annual eye exam:  Yes    Dental care twice a year:  Yes    Sleep apnea or symptoms of sleep apnea:  Daytime drowsiness    Diet:  Regular (no restrictions)    Frequency of exercise:  1 day/week    Duration of exercise:  15-30 minutes    Taking medications regularly:  No    Barriers to taking medications:  Cost of medication and Problems remembering to take them    Medication side effects:  Other    PHQ-2 Total Score: 0    Additional concerns today:  Yes     hearing loss   vomiting off and on for about 1 year- possible reflux   No relief from cimetidine or peptobismal  Some problems getting stream going with urination in the morning.             Hypertension Follow-up      Do you check your blood pressure regularly outside of the clinic? No     Are you following a low salt diet? No    Are your blood pressures ever more than 140 on the top number (systolic) OR more   than 90 on the bottom number (diastolic), for example 140/90? Yes      Today's PHQ-2 Score:   PHQ-2 ( 1999 [employer]) 3/4/2021   Q1: Little interest or pleasure in doing things 0   Q2: Feeling down, depressed or hopeless 0   PHQ-2 Score 0   Q1: Little interest or pleasure in doing things Not at all   Q2: Feeling down, depressed or hopeless Not at all   PHQ-2 Score 0       Abuse: Current or Past(Physical, Sexual or Emotional)- No  Do you feel safe in your environment? Yes    Have you ever done Advance Care Planning? (For example, a Health Directive, POLST, or a discussion with a medical provider or your loved ones about your wishes): Yes, patient states has an Advance Care Planning document and will bring a copy to the clinic.    Social History     Tobacco Use     Smoking status: Never Smoker      Smokeless tobacco: Never Used     Tobacco comment: no smoking in the home   Substance Use Topics     Alcohol use: Yes     Comment: rare         Alcohol Use 3/4/2021   Prescreen: >3 drinks/day or >7 drinks/week? Not Applicable   Prescreen: >3 drinks/day or >7 drinks/week? -       Last PSA:   PSA   Date Value Ref Range Status   10/17/2008 1.73 0 - 4 ug/L Final       Reviewed orders with patient. Reviewed health maintenance and updated orders accordingly - Yes  Lab work is in process    Reviewed and updated as needed this visit by clinical staff  Tobacco  Allergies  Meds              Reviewed and updated as needed this visit by Provider                Past Medical History:   Diagnosis Date     Backache, unspecified     Hx of back pain     Depressive disorder, not elsewhere classified 1979    committed for suicidal ideation and depression     Depressive disorder, not elsewhere classified 12/18/01    E/P Attention Deficit Disorder - See Sum. From [facility]     Hernia of unspecified site of abdominal cavity without mention of obstruction or gangrene 08/07/1998    Umbilical hernia repair with mesh plug      Past Surgical History:   Procedure Laterality Date     C APPENDECTOMY      5 years of age     COLONOSCOPY N/A 9/23/2015    Procedure: COLONOSCOPY;  Surgeon: Juan Francisco Cornejo MD;  Location: [facility]     HC RECONSTRUCT TENDON GOLDEN EA, W LOCAL TISSUES  08/07/1998    A-1 pulley release, right fourth finger     HC UGI ENDOSCOPY DIAG W OR W/O BRUSH/WASH  2002    bx. pending  - use proton pump inhibitor indefinitely     HEMILAMINECTOMY, DISCECTOMY LUMBAR TWO LEVELS, COMBINED Right 10/21/2015    Procedure: COMBINED HEMILAMINECTOMY, DISCECTOMY LUMBAR TWO LEVELS;  Surgeon: Jesus Ornelas MD;  Location: [facility]     INJECT EPIDURAL LUMBAR  11/14/13,2/10/14    [facility]     INJECT EPIDURAL LUMBAR  5/13/14,8/5/14    [facility]     INJECT EPIDURAL LUMBAR  12/15/14    [facility]      "Z NONSPECIFIC PROCEDURE  1983    neck fusion       Review of Systems   Constitutional: Negative for chills and fever.   HENT: Positive for congestion, ear pain and hearing loss. Negative for sore throat.    Eyes: Negative for pain and visual disturbance.   Respiratory: Positive for cough and shortness of breath.    Cardiovascular: Negative for chest pain, palpitations and peripheral edema.   Gastrointestinal: Positive for heartburn. Negative for abdominal pain, constipation, diarrhea, hematochezia and nausea.   Genitourinary: Positive for frequency. Negative for discharge, dysuria, genital sores, hematuria, impotence and urgency.   Musculoskeletal: Positive for arthralgias, joint swelling and myalgias.   Skin: Negative for rash.   Neurological: Positive for weakness. Negative for dizziness, headaches and paresthesias.   Psychiatric/Behavioral: Negative for mood changes. The patient is not nervous/anxious.      CBD Turmeric topically helps his joint pain    OBJECTIVE:   /80   Pulse 77   Temp 97.3  F (36.3  C) (Tympanic)   Resp 14   Ht 1.702 m (5' 7\")   Wt 82.1 kg (181 lb)   SpO2 97%   BMI 28.35 kg/m      Physical Exam  GENERAL: healthy, alert and no distress  EYES: Eyes grossly normal to inspection, PERRL and conjunctivae and sclerae normal  HENT: ear canals and TM's normal, nose and mouth without ulcers or lesions  NECK: no adenopathy, no asymmetry, masses, or scars and thyroid normal to palpation  RESP: lungs clear to auscultation - no rales, rhonchi or wheezes  CV: regular rate and rhythm, normal S1 S2, no S3 or S4, no murmur, click or rub, no peripheral edema and peripheral pulses strong  ABDOMEN: soft, nontender, no hepatosplenomegaly, no masses and bowel sounds normal  MS: no gross musculoskeletal defects noted, no edema  SKIN: no suspicious lesions or rashes  NEURO: Normal strength and tone, mentation intact and speech normal  PSYCH: mentation appears normal, affect normal/bright  LYMPH: no " "cervical, supraclavicular, axillary, or inguinal adenopathy    Diagnostic Test Results:  Labs reviewed in Epic  No results found for any visits on 03/04/21.    ASSESSMENT/PLAN:   Bryce was seen today for physical.    Diagnoses and all orders for this visit:    Routine general medical examination at a health care facility    Hypertension goal BP (blood pressure) < 140/90  -     losartan (COZAAR) 100 MG tablet; Take 1 tablet (100 mg) by mouth daily Due for blood pressure labs  -     carvedilol (COREG) 6.25 MG tablet; Take 1 tablet (6.25 mg) by mouth 2 times daily  -     CBC with platelets and differential  -     Comprehensive metabolic panel (BMP + Alb, Alk Phos, ALT, AST, Total. Bili, TP)  -     LDL cholesterol direct    Gastroesophageal reflux disease without esophagitis  -     GASTROENTEROLOGY ADULT REF PROCEDURE ONLY; Future  -     omeprazole (PRILOSEC) 40 MG DR capsule; Take 1 capsule (40 mg) by mouth daily    Hearing loss of left ear, unspecified hearing loss type  -     AUDIOLOGY ADULT REFERRAL; Future    Nocturia  -     PSA, screen  -     *UA reflex to Microscopic and Culture ([facility] (except [place] and [place])        Patient has been advised of split billing requirements and indicates understanding: Yes  COUNSELING:   Reviewed preventive health counseling, as reflected in patient instructions    Estimated body mass index is 28.35 kg/m  as calculated from the following:    Height as of this encounter: 1.702 m (5' 7\").    Weight as of this encounter: 82.1 kg (181 lb).     Weight management plan: Discussed healthy diet and exercise guidelines    He reports that he has never smoked. He has never used smokeless tobacco.    Call or return to the clinic with any worsening of symptoms or no resolution. Patient/Parent verbalized understanding and is in agreement. Medication side effects reviewed.   Current Outpatient Medications   Medication Sig Dispense Refill     acetaminophen (TYLENOL) 325 " MG tablet Take 2 tablets (650 mg) by mouth every 4 hours as needed for mild pain       carvedilol (COREG) 6.25 MG tablet Take 1 tablet (6.25 mg) by mouth 2 times daily 180 tablet 1     cimetidine (TAGAMET) 400 MG tablet Take 1 tablet (400 mg) by mouth 2 times daily 14 tablet 0     diclofenac (VOLTAREN) 1 % topical gel Apply 4 g topically 4 times daily 100 g 5     losartan (COZAAR) 100 MG tablet Take 1 tablet (100 mg) by mouth daily Due for blood pressure labs 90 tablet 1     omeprazole (PRILOSEC) 40 MG DR capsule Take 1 capsule (40 mg) by mouth daily 90 capsule 1     Chart documentation with Dragon Voice recognition Software. Although reviewed after completion, some words and grammatical errors may remain.    Counseling Resources:  ATP IV Guidelines  Pooled Cohorts Equation Calculator  FRAX Risk Assessment  ICSI Preventive Guidelines  Dietary Guidelines for Americans, 2010  [employer] MyPlate  ASA Prophylaxis  Lung CA Screening    DES Mejia [facility]  Answers for HPI/ROS submitted by the patient on 3/4/2021   Annual Exam:  If you checked off any problems, how difficult have these problems made it for you to do your work, take care of things at home, or get along with other people?: Somewhat difficult  PHQ9 TOTAL SCORE: 7  YAN 7 TOTAL SCORE: 4

## 2021-03-04 NOTE — PROGRESS NOTES
SUBJECTIVE:   CC: Bryce Diaz is an 65 year old male who presents for preventative health visit.       Patient has been advised of split billing requirements and indicates understanding:   Healthy Habits:     Getting at least 3 servings of Calcium per day:  Yes    Bi-annual eye exam:  Yes    Dental care twice a year:  Yes    Sleep apnea or symptoms of sleep apnea:  Daytime drowsiness    Diet:  Regular (no restrictions)    Frequency of exercise:  1 day/week    Duration of exercise:  15-30 minutes    Taking medications regularly:  No    Barriers to taking medications:  Cost of medication and Problems remembering to take them    Medication side effects:  Other    PHQ-2 Total Score: 0    Additional concerns today:  Yes     hearing loss   vomiting off and on for about 1 year- possible reflux   No relief from cimetidine or peptobismal  Some problems getting stream going with urination in the morning.             Hypertension Follow-up      Do you check your blood pressure regularly outside of the clinic? No     Are you following a low salt diet? No    Are your blood pressures ever more than 140 on the top number (systolic) OR more   than 90 on the bottom number (diastolic), for example 140/90? Yes      Today's PHQ-2 Score:   PHQ-2 ( 1999 [employer]) 3/4/2021   Q1: Little interest or pleasure in doing things 0   Q2: Feeling down, depressed or hopeless 0   PHQ-2 Score 0   Q1: Little interest or pleasure in doing things Not at all   Q2: Feeling down, depressed or hopeless Not at all   PHQ-2 Score 0       Abuse: Current or Past(Physical, Sexual or Emotional)- No  Do you feel safe in your environment? Yes    Have you ever done Advance Care Planning? (For example, a Health Directive, POLST, or a discussion with a medical provider or your loved ones about your wishes): Yes, patient states has an Advance Care Planning document and will bring a copy to the clinic.    Social History     Tobacco Use     Smoking status: Never Smoker      Smokeless tobacco: Never Used     Tobacco comment: no smoking in the home   Substance Use Topics     Alcohol use: Yes     Comment: rare         Alcohol Use 3/4/2021   Prescreen: >3 drinks/day or >7 drinks/week? Not Applicable   Prescreen: >3 drinks/day or >7 drinks/week? -       Last PSA:   PSA   Date Value Ref Range Status   10/17/2008 1.73 0 - 4 ug/L Final       Reviewed orders with patient. Reviewed health maintenance and updated orders accordingly - Yes  Lab work is in process    Reviewed and updated as needed this visit by clinical staff  Tobacco  Allergies  Meds              Reviewed and updated as needed this visit by Provider                Past Medical History:   Diagnosis Date     Backache, unspecified     Hx of back pain     Depressive disorder, not elsewhere classified 1979    committed for suicidal ideation and depression     Depressive disorder, not elsewhere classified 12/18/01    E/P Attention Deficit Disorder - See Sum. From [facility]     Hernia of unspecified site of abdominal cavity without mention of obstruction or gangrene 08/07/1998    Umbilical hernia repair with mesh plug      Past Surgical History:   Procedure Laterality Date     C APPENDECTOMY      5 years of age     COLONOSCOPY N/A 9/23/2015    Procedure: COLONOSCOPY;  Surgeon: Juan Francisco Cornejo MD;  Location: [facility]     HC RECONSTRUCT TENDON GOLDEN EA, W LOCAL TISSUES  08/07/1998    A-1 pulley release, right fourth finger     HC UGI ENDOSCOPY DIAG W OR W/O BRUSH/WASH  2002    bx. pending  - use proton pump inhibitor indefinitely     HEMILAMINECTOMY, DISCECTOMY LUMBAR TWO LEVELS, COMBINED Right 10/21/2015    Procedure: COMBINED HEMILAMINECTOMY, DISCECTOMY LUMBAR TWO LEVELS;  Surgeon: Jesus Ornelas MD;  [facility]     INJECT EPIDURAL LUMBAR  11/14/13,2/10/14    [facility]     INJECT EPIDURAL LUMBAR  5/13/14,8/5/14    [facility]     INJECT EPIDURAL LUMBAR  12/15/14    [facility]      "Z NONSPECIFIC PROCEDURE  1983    neck fusion       Review of Systems   Constitutional: Negative for chills and fever.   HENT: Positive for congestion, ear pain and hearing loss. Negative for sore throat.    Eyes: Negative for pain and visual disturbance.   Respiratory: Positive for cough and shortness of breath.    Cardiovascular: Negative for chest pain, palpitations and peripheral edema.   Gastrointestinal: Positive for heartburn. Negative for abdominal pain, constipation, diarrhea, hematochezia and nausea.   Genitourinary: Positive for frequency. Negative for discharge, dysuria, genital sores, hematuria, impotence and urgency.   Musculoskeletal: Positive for arthralgias, joint swelling and myalgias.   Skin: Negative for rash.   Neurological: Positive for weakness. Negative for dizziness, headaches and paresthesias.   Psychiatric/Behavioral: Negative for mood changes. The patient is not nervous/anxious.      CBD Turmeric topically helps his joint pain    OBJECTIVE:   /80   Pulse 77   Temp 97.3  F (36.3  C) (Tympanic)   Resp 14   Ht 1.702 m (5' 7\")   Wt 82.1 kg (181 lb)   SpO2 97%   BMI 28.35 kg/m      Physical Exam  GENERAL: healthy, alert and no distress  EYES: Eyes grossly normal to inspection, PERRL and conjunctivae and sclerae normal  HENT: ear canals and TM's normal, nose and mouth without ulcers or lesions  NECK: no adenopathy, no asymmetry, masses, or scars and thyroid normal to palpation  RESP: lungs clear to auscultation - no rales, rhonchi or wheezes  CV: regular rate and rhythm, normal S1 S2, no S3 or S4, no murmur, click or rub, no peripheral edema and peripheral pulses strong  ABDOMEN: soft, nontender, no hepatosplenomegaly, no masses and bowel sounds normal  MS: no gross musculoskeletal defects noted, no edema  SKIN: no suspicious lesions or rashes  NEURO: Normal strength and tone, mentation intact and speech normal  PSYCH: mentation appears normal, affect normal/bright  LYMPH: no " "cervical, supraclavicular, axillary, or inguinal adenopathy    Diagnostic Test Results:  Labs reviewed in Epic  No results found for any visits on 03/04/21.    ASSESSMENT/PLAN:   Bryce was seen today for physical.    Diagnoses and all orders for this visit:    Routine general medical examination at a health care facility    Hypertension goal BP (blood pressure) < 140/90  -     losartan (COZAAR) 100 MG tablet; Take 1 tablet (100 mg) by mouth daily Due for blood pressure labs  -     carvedilol (COREG) 6.25 MG tablet; Take 1 tablet (6.25 mg) by mouth 2 times daily  -     CBC with platelets and differential  -     Comprehensive metabolic panel (BMP + Alb, Alk Phos, ALT, AST, Total. Bili, TP)  -     LDL cholesterol direct    Gastroesophageal reflux disease without esophagitis  -     GASTROENTEROLOGY ADULT REF PROCEDURE ONLY; Future  -     omeprazole (PRILOSEC) 40 MG DR capsule; Take 1 capsule (40 mg) by mouth daily    Hearing loss of left ear, unspecified hearing loss type  -     AUDIOLOGY ADULT REFERRAL; Future    Nocturia  -     PSA, screen  -     *UA reflex to Microscopic and Culture ([facility] (except [place] and [place])        Patient has been advised of split billing requirements and indicates understanding: Yes  COUNSELING:   Reviewed preventive health counseling, as reflected in patient instructions    Estimated body mass index is 28.35 kg/m  as calculated from the following:    Height as of this encounter: 1.702 m (5' 7\").    Weight as of this encounter: 82.1 kg (181 lb).     Weight management plan: Discussed healthy diet and exercise guidelines    He reports that he has never smoked. He has never used smokeless tobacco.    Call or return to the clinic with any worsening of symptoms or no resolution. Patient/Parent verbalized understanding and is in agreement. Medication side effects reviewed.   Current Outpatient Medications   Medication Sig Dispense Refill     acetaminophen (TYLENOL) 325 " MG tablet Take 2 tablets (650 mg) by mouth every 4 hours as needed for mild pain       carvedilol (COREG) 6.25 MG tablet Take 1 tablet (6.25 mg) by mouth 2 times daily 180 tablet 1     cimetidine (TAGAMET) 400 MG tablet Take 1 tablet (400 mg) by mouth 2 times daily 14 tablet 0     diclofenac (VOLTAREN) 1 % topical gel Apply 4 g topically 4 times daily 100 g 5     losartan (COZAAR) 100 MG tablet Take 1 tablet (100 mg) by mouth daily Due for blood pressure labs 90 tablet 1     omeprazole (PRILOSEC) 40 MG DR capsule Take 1 capsule (40 mg) by mouth daily 90 capsule 1     Chart documentation with Dragon Voice recognition Software. Although reviewed after completion, some words and grammatical errors may remain.    Counseling Resources:  ATP IV Guidelines  Pooled Cohorts Equation Calculator  FRAX Risk Assessment  ICSI Preventive Guidelines  Dietary Guidelines for Americans, 2010  [employer] MyPlate  ASA Prophylaxis  Lung CA Screening    DES Mejia [facility]  Answers for HPI/ROS submitted by the patient on 3/4/2021   Annual Exam:  If you checked off any problems, how difficult have these problems made it for you to do your work, take care of things at home, or get along with other people?: Somewhat difficult  PHQ9 TOTAL SCORE: 7  YAN 7 TOTAL SCORE: 4

## 2021-03-04 NOTE — LETTER
March 8, 2021      Bryce Diaz  [address] [address]        Dear ,    We are writing to inform you of your test results.    Normal urine   Normal kidney and liver function   Normal blood sugars.   LDL cholesterol is borderline high.   Low fat diet recommended and daily exercise for 30-60 minutes to break a swear.   Mild anemia ongoing.   Colonoscopy recommended. Order has been placed. You may be able to get this done when you have your EGD scope done.     Prostate antigen mildly elevated.   May be related to benign prostatic hypertrophy or BPH which would explain your nighttime symptoms with urination.   Recommend you start Flomax 0.4 mg at bedtime to help with this. I sent this prescription to your pharmacy.   We should repeat your PSA blood level in 3- 6 months to monitor this level and make sure no further prostate intervention is needed.     Resulted Orders   CBC with platelets and differential   Result Value Ref Range    WBC 7.2 4.0 - 11.0 10e9/L    RBC Count 3.94 (L) 4.4 - 5.9 10e12/L    Hemoglobin 12.1 (L) 13.3 - 17.7 g/dL    Hematocrit 36.1 (L) 40.0 - 53.0 %    MCV 92 78 - 100 fl    MCH 30.7 26.5 - 33.0 pg    MCHC 33.5 31.5 - 36.5 g/dL    RDW 13.0 10.0 - 15.0 %    Platelet Count 243 150 - 450 10e9/L    % Neutrophils 53.1 %    % Lymphocytes 32.2 %    % Monocytes 10.4 %    % Eosinophils 3.7 %    % Basophils 0.6 %    Absolute Neutrophil 3.8 1.6 - 8.3 10e9/L    Absolute Lymphocytes 2.3 0.8 - 5.3 10e9/L    Absolute Monocytes 0.8 0.0 - 1.3 10e9/L    Absolute Eosinophils 0.3 0.0 - 0.7 10e9/L    Absolute Basophils 0.0 0.0 - 0.2 10e9/L    Diff Method Automated Method    Comprehensive metabolic panel (BMP + Alb, Alk Phos, ALT, AST, Total. Bili, TP)   Result Value Ref Range    Sodium 140 133 - 144 mmol/L    Potassium 4.2 3.4 - 5.3 mmol/L    Chloride 108 94 - 109 mmol/L    Carbon Dioxide 27 20 - 32 mmol/L    Anion Gap 5 3 - 14 mmol/L    Glucose 89 70 - 99 mg/dL    Urea Nitrogen 37 (H) 7 -  30 mg/dL    Creatinine 1.08 0.66 - 1.25 mg/dL    GFR Estimate 72 >60 mL/min/[1.73_m2]      Comment:      Non  GFR Calc  Starting 12/18/2018, serum creatinine based estimated GFR (eGFR) will be   calculated using the Chronic Kidney Disease Epidemiology Collaboration   (CKD-EPI) equation.      GFR Estimate If Black 84 >60 mL/min/[1.73_m2]      Comment:       GFR Calc  Starting 12/18/2018, serum creatinine based estimated GFR (eGFR) will be   calculated using the Chronic Kidney Disease Epidemiology Collaboration   (CKD-EPI) equation.      Calcium 9.5 8.5 - 10.1 mg/dL    Bilirubin Total 0.2 0.2 - 1.3 mg/dL    Albumin 3.8 3.4 - 5.0 g/dL    Protein Total 7.7 6.8 - 8.8 g/dL    Alkaline Phosphatase 92 40 - 150 U/L    ALT 26 0 - 70 U/L    AST 15 0 - 45 U/L   LDL cholesterol direct   Result Value Ref Range    LDL Cholesterol Direct 133 (H) <100 mg/dL      Comment:      Above desirable:  100-129 mg/dl  Borderline High:  130-159 mg/dL  High:             160-189 mg/dL  Very high:       >189 mg/dl     PSA, screen   Result Value Ref Range    PSA 5.53 (H) 0 - 4 ug/L      Comment:      Assay Method:  Chemiluminescence using Siemens Vista analyzer   *UA reflex to Microscopic and Culture ([facility] (except [place] and [place])   Result Value Ref Range    Color Urine Yellow     Appearance Urine Clear     Glucose Urine Negative NEG^Negative mg/dL    Bilirubin Urine Negative NEG^Negative    Ketones Urine Negative NEG^Negative mg/dL    Specific Gravity Urine 1.025 1.003 - 1.035    Blood Urine Negative NEG^Negative    pH Urine 5.5 5.0 - 7.0 pH    Protein Albumin Urine Negative NEG^Negative mg/dL    Urobilinogen Urine 0.2 0.2 - 1.0 EU/dL    Nitrite Urine Negative NEG^Negative    Leukocyte Esterase Urine Negative NEG^Negative    Source Urine        If you have any questions or concerns, please call the clinic at the number listed above.       Sincerely,      DES Mejia  CNP/kaf

## 2021-03-05 ASSESSMENT — ANXIETY QUESTIONNAIRES: GAD7 TOTAL SCORE: 4

## 2021-03-07 DIAGNOSIS — I10 HYPERTENSION GOAL BP (BLOOD PRESSURE) < 140/90: ICD-10-CM

## 2021-03-07 DIAGNOSIS — Z12.11 COLON CANCER SCREENING: ICD-10-CM

## 2021-03-07 DIAGNOSIS — R35.1 BENIGN PROSTATIC HYPERPLASIA WITH NOCTURIA: Primary | ICD-10-CM

## 2021-03-07 DIAGNOSIS — N40.1 BENIGN PROSTATIC HYPERPLASIA WITH NOCTURIA: Primary | ICD-10-CM

## 2021-03-07 DIAGNOSIS — D64.9 ANEMIA, UNSPECIFIED TYPE: ICD-10-CM

## 2021-03-07 RX ORDER — LOSARTAN POTASSIUM 100 MG/1
100 TABLET ORAL DAILY
Qty: 90 TABLET | Refills: 1 | Status: SHIPPED | OUTPATIENT
Start: 2021-03-07 | End: 2021-10-12

## 2021-03-07 RX ORDER — TAMSULOSIN HYDROCHLORIDE 0.4 MG/1
0.4 CAPSULE ORAL DAILY
Qty: 90 CAPSULE | Refills: 1 | Status: SHIPPED | OUTPATIENT
Start: 2021-03-07 | End: 2021-10-12

## 2021-03-13 DIAGNOSIS — Z11.59 ENCOUNTER FOR SCREENING FOR OTHER VIRAL DISEASES: ICD-10-CM

## 2021-03-25 ENCOUNTER — ANESTHESIA EVENT (OUTPATIENT)
Dept: GASTROENTEROLOGY | Facility: CLINIC | Age: 66
End: 2021-03-25
Payer: COMMERCIAL

## 2021-03-25 NOTE — ANESTHESIA PREPROCEDURE EVALUATION
Anesthesia Pre-Procedure Evaluation    Patient: Bryce Diaz   MRN: [id number] : 1955        Preoperative Diagnosis: GERD without esophagitis [K21.9]  Colon cancer screening [Z12.11]  BPH associated with nocturia [N40.1, R35.1]  Anemia, unspecified type [D64.9]   Procedure : Procedure(s):  COLONOSCOPY  ESOPHAGOGASTRODUODENOSCOPY (EGD)     Past Medical History:   Diagnosis Date     Backache, unspecified     Hx of back pain     Depressive disorder, not elsewhere classified     committed for suicidal ideation and depression     Depressive disorder, not elsewhere classified 01    E/P Attention Deficit Disorder - See Sum. From [facility]     Hernia of unspecified site of abdominal cavity without mention of obstruction or gangrene 1998    Umbilical hernia repair with mesh plug      Past Surgical History:   Procedure Laterality Date     C APPENDECTOMY      5 years of age     COLONOSCOPY N/A 2015    Procedure: COLONOSCOPY;  Surgeon: Juan Francisco Cornejo MD;  Location: [facility]     HC RECONSTRUCT TENDON GOLDEN EA, W LOCAL TISSUES  1998    A-1 pulley release, right fourth finger     HC UGI ENDOSCOPY DIAG W OR W/O BRUSH/WASH      bx. pending  - use proton pump inhibitor indefinitely     HEMILAMINECTOMY, DISCECTOMY LUMBAR TWO LEVELS, COMBINED Right 10/21/2015    Procedure: COMBINED HEMILAMINECTOMY, DISCECTOMY LUMBAR TWO LEVELS;  Surgeon: Jesus Ornelas MD;  Location: [facility]     INJECT EPIDURAL LUMBAR  14/13,2/10/14    [facility]     INJECT EPIDURAL LUMBAR  /14,14    [facility]     INJECT EPIDURAL LUMBAR  12/15/14    [facility]     Z NONSPECIFIC PROCEDURE  1983    neck fusion      No Known Allergies   Social History     Tobacco Use     Smoking status: Never Smoker     Smokeless tobacco: Never Used     Tobacco comment: no smoking in the home   Substance Use Topics     Alcohol use: Yes     Comment: rare      Wt Readings from Last 1  Encounters:   03/04/21 82.1 kg (181 lb)        Anesthesia Evaluation   Pt has had prior anesthetic. Type: General, MAC and Regional.    No history of anesthetic complications       ROS/MED HX  ENT/Pulmonary:  - neg pulmonary ROS     Neurologic: Comment: Sciatic nerve pain      Cardiovascular:     (+) hypertension-----    METS/Exercise Tolerance: >4 METS    Hematologic:     (+) anemia,     Musculoskeletal: Comment: backpain  Lumbar stenosis      GI/Hepatic:     (+) GERD,     Renal/Genitourinary:     (+) BPH,     Endo: Comment: overweight      Psychiatric/Substance Use:     (+) psychiatric history depression and other (comment)     Infectious Disease:  - neg infectious disease ROS     Malignancy:  - neg malignancy ROS     Other:  - neg other ROS          Physical Exam    Airway        Mallampati: II   TM distance: > 3 FB   Neck ROM: full   Mouth opening: > 3 cm    Respiratory Devices and Support         Dental  no notable dental history         Cardiovascular   cardiovascular exam normal       Rhythm and rate: regular and normal     Pulmonary   pulmonary exam normal        breath sounds clear to auscultation           OUTSIDE LABS:  CBC:   Lab Results   Component Value Date    WBC 7.2 03/04/2021    WBC 6.9 03/20/2019    HGB 12.1 (L) 03/04/2021    HGB 13.6 03/20/2019    HCT 36.1 (L) 03/04/2021    HCT 40.6 03/20/2019     03/04/2021     03/20/2019     BMP:   Lab Results   Component Value Date     03/04/2021     03/20/2019    POTASSIUM 4.2 03/04/2021    POTASSIUM 4.5 03/20/2019    CHLORIDE 108 03/04/2021    CHLORIDE 107 03/20/2019    CO2 27 03/04/2021    CO2 30 03/20/2019    BUN 37 (H) 03/04/2021    BUN 21 03/20/2019    CR 1.08 03/04/2021    CR 0.78 03/20/2019    GLC 89 03/04/2021    GLC 90 03/20/2019     COAGS: No results found for: PTT, INR, FIBR  POC: No results found for: BGM, HCG, HCGS  HEPATIC:   Lab Results   Component Value Date    ALBUMIN 3.8 03/04/2021    PROTTOTAL 7.7 03/04/2021    ALT  26 03/04/2021    AST 15 03/04/2021    ALKPHOS 92 03/04/2021    BILITOTAL 0.2 03/04/2021     OTHER:   Lab Results   Component Value Date    A1C 5.2 03/20/2019    SVETA 9.5 03/04/2021    MAG 2.3 04/06/2004    TSH 1.41 03/20/2019    CRP 6.4 07/19/2017    SED 16 (H) 09/30/2005       Anesthesia Plan    ASA Status:  2      Anesthesia Type: General.   Induction: Propofol, Intravenous.   Maintenance: TIVA.        Consents    Anesthesia Plan(s) and associated risks, benefits, and realistic alternatives discussed. Questions answered and patient/representative(s) expressed understanding.     - Discussed with:  Patient         Postoperative Care       PONV prophylaxis: Ondansetron (or other 5HT-3)     Comments:                DES Rosales CRNA

## 2021-03-26 DIAGNOSIS — Z11.59 ENCOUNTER FOR SCREENING FOR OTHER VIRAL DISEASES: ICD-10-CM

## 2021-03-26 LAB
SARS-COV-2 RNA RESP QL NAA+PROBE: NORMAL
SPECIMEN SOURCE: NORMAL

## 2021-03-26 PROCEDURE — U0005 INFEC AGEN DETEC AMPLI PROBE: HCPCS | Performed by: SURGERY

## 2021-03-26 PROCEDURE — U0003 INFECTIOUS AGENT DETECTION BY NUCLEIC ACID (DNA OR RNA); SEVERE ACUTE RESPIRATORY SYNDROME CORONAVIRUS 2 (SARS-COV-2) (CORONAVIRUS DISEASE [COVID-19]), AMPLIFIED PROBE TECHNIQUE, MAKING USE OF HIGH THROUGHPUT TECHNOLOGIES AS DESCRIBED BY CMS-2020-01-R: HCPCS | Performed by: SURGERY

## 2021-03-27 LAB
LABORATORY COMMENT REPORT: NORMAL
SARS-COV-2 RNA RESP QL NAA+PROBE: NEGATIVE
SPECIMEN SOURCE: NORMAL

## 2021-03-29 ENCOUNTER — NURSE TRIAGE (OUTPATIENT)
Dept: NURSING | Facility: CLINIC | Age: 66
End: 2021-03-29

## 2021-03-29 ENCOUNTER — HOSPITAL ENCOUNTER (OUTPATIENT)
Facility: CLINIC | Age: 66
Discharge: HOME | End: 2021-03-29
Attending: SURGERY | Admitting: SURGERY
Payer: COMMERCIAL

## 2021-03-29 ENCOUNTER — ANESTHESIA (OUTPATIENT)
Dept: GASTROENTEROLOGY | Facility: CLINIC | Age: 66
End: 2021-03-29
Payer: COMMERCIAL

## 2021-03-29 VITALS
TEMPERATURE: 97.9 F | WEIGHT: 181 LBS | BODY MASS INDEX: 28.41 KG/M2 | SYSTOLIC BLOOD PRESSURE: 110 MMHG | OXYGEN SATURATION: 96 % | DIASTOLIC BLOOD PRESSURE: 79 MMHG | HEART RATE: 71 BPM | HEIGHT: 67 IN | RESPIRATION RATE: 18 BRPM

## 2021-03-29 LAB
COLONOSCOPY: NORMAL
UPPER GI ENDOSCOPY: NORMAL

## 2021-03-29 PROCEDURE — 88305 TISSUE EXAM BY PATHOLOGIST: CPT | Mod: TC | Performed by: SURGERY

## 2021-03-29 PROCEDURE — 43239 EGD BIOPSY SINGLE/MULTIPLE: CPT | Performed by: SURGERY

## 2021-03-29 PROCEDURE — 258N000003 HC RX IP 258 OP 636: Performed by: SURGERY

## 2021-03-29 PROCEDURE — 370N000017 HC ANESTHESIA TECHNICAL FEE, PER MIN: Performed by: SURGERY

## 2021-03-29 PROCEDURE — 88305 TISSUE EXAM BY PATHOLOGIST: CPT | Mod: 26 | Performed by: PATHOLOGY

## 2021-03-29 PROCEDURE — G0121 COLON CA SCRN NOT HI RSK IND: HCPCS | Performed by: SURGERY

## 2021-03-29 PROCEDURE — 250N000011 HC RX IP 250 OP 636: Performed by: NURSE ANESTHETIST, CERTIFIED REGISTERED

## 2021-03-29 PROCEDURE — 250N000009 HC RX 250: Performed by: SURGERY

## 2021-03-29 PROCEDURE — 45378 DIAGNOSTIC COLONOSCOPY: CPT | Performed by: SURGERY

## 2021-03-29 PROCEDURE — 43239 EGD BIOPSY SINGLE/MULTIPLE: CPT | Mod: 51 | Performed by: SURGERY

## 2021-03-29 PROCEDURE — 250N000009 HC RX 250: Performed by: NURSE ANESTHETIST, CERTIFIED REGISTERED

## 2021-03-29 RX ORDER — PROPOFOL 10 MG/ML
INJECTION, EMULSION INTRAVENOUS PRN
Status: DISCONTINUED | OUTPATIENT
Start: 2021-03-29 | End: 2021-03-29

## 2021-03-29 RX ORDER — SODIUM CHLORIDE, SODIUM LACTATE, POTASSIUM CHLORIDE, CALCIUM CHLORIDE 600; 310; 30; 20 MG/100ML; MG/100ML; MG/100ML; MG/100ML
INJECTION, SOLUTION INTRAVENOUS CONTINUOUS
Status: DISCONTINUED | OUTPATIENT
Start: 2021-03-29 | End: 2021-03-29 | Stop reason: HOSPADM

## 2021-03-29 RX ORDER — LIDOCAINE HYDROCHLORIDE 10 MG/ML
INJECTION, SOLUTION EPIDURAL; INFILTRATION; INTRACAUDAL; PERINEURAL PRN
Status: DISCONTINUED | OUTPATIENT
Start: 2021-03-29 | End: 2021-03-29

## 2021-03-29 RX ORDER — LIDOCAINE 40 MG/G
CREAM TOPICAL
Status: DISCONTINUED | OUTPATIENT
Start: 2021-03-29 | End: 2021-03-29 | Stop reason: HOSPADM

## 2021-03-29 RX ORDER — PROPOFOL 10 MG/ML
INJECTION, EMULSION INTRAVENOUS CONTINUOUS PRN
Status: DISCONTINUED | OUTPATIENT
Start: 2021-03-29 | End: 2021-03-29

## 2021-03-29 RX ADMIN — TOPICAL ANESTHETIC 1 SPRAY: 200 SPRAY DENTAL; PERIODONTAL at 13:04

## 2021-03-29 RX ADMIN — PROPOFOL 200 MCG/KG/MIN: 10 INJECTION, EMULSION INTRAVENOUS at 13:04

## 2021-03-29 RX ADMIN — LIDOCAINE HYDROCHLORIDE 0.1 ML: 10 INJECTION, SOLUTION EPIDURAL; INFILTRATION; INTRACAUDAL; PERINEURAL at 12:32

## 2021-03-29 RX ADMIN — SODIUM CHLORIDE, POTASSIUM CHLORIDE, SODIUM LACTATE AND CALCIUM CHLORIDE: 600; 310; 30; 20 INJECTION, SOLUTION INTRAVENOUS at 12:32

## 2021-03-29 RX ADMIN — PROPOFOL 100 MG: 10 INJECTION, EMULSION INTRAVENOUS at 13:04

## 2021-03-29 RX ADMIN — TOPICAL ANESTHETIC 1 SPRAY: 200 SPRAY DENTAL; PERIODONTAL at 13:02

## 2021-03-29 RX ADMIN — LIDOCAINE HYDROCHLORIDE 40 MG: 10 INJECTION, SOLUTION EPIDURAL; INFILTRATION; INTRACAUDAL; PERINEURAL at 13:04

## 2021-03-29 ASSESSMENT — MIFFLIN-ST. JEOR: SCORE: 1574.64

## 2021-03-29 NOTE — ANESTHESIA POSTPROCEDURE EVALUATION
Patient: Bryce Diaz    Procedure(s):  COLONOSCOPY  ESOPHAGOGASTRODUODENOSCOPY, WITH BIOPSY    Diagnosis:GERD without esophagitis [K21.9]  Colon cancer screening [Z12.11]  BPH associated with nocturia [N40.1, R35.1]  Anemia, unspecified type [D64.9]  Diagnosis Additional Information: No value filed.    Anesthesia Type:  General    Note:  Disposition: Outpatient   Postop Pain Control: Uneventful            Sign Out: Well controlled pain   PONV: No   Neuro/Psych: Uneventful            Sign Out: Acceptable/Baseline neuro status   Airway/Respiratory: Uneventful            Sign Out: Acceptable/Baseline resp. status   CV/Hemodynamics: Uneventful            Sign Out: Acceptable CV status   Other NRE: NONE   DID A NON-ROUTINE EVENT OCCUR? No         Last vitals:  Vitals:    03/29/21 1204 03/29/21 1338 03/29/21 1402   BP: (!) 140/107 (!) 89/64 110/79   Pulse: 69 71    Resp: 16  18   Temp: 36.6  C (97.9  F)     SpO2: 95% 92% 96%       Last vitals prior to Anesthesia Care Transfer:  CRNA VITALS  3/29/2021 1304 - 3/29/2021 1404      3/29/2021             Pulse:  68    SpO2:  93 %          Electronically Signed By: Alexandr Pagan CRNA, APRN CRNA  March 29, 2021  2:11 PM

## 2021-03-29 NOTE — INTERVAL H&P NOTE
The History and Physical has been reviewed, the patient has been examined and no changes have occurred in the patient's condition since the H & P was completed.     emesis; last colonoscopy 2015 (poor prep at the time); ?famhx of colon cancer    [facility], DO

## 2021-03-29 NOTE — LETTER
Bryce Diaz  [address] [address]  April 2, 2021    Dear Bryce,   This letter is to inform you of the results of your pathology report on your upper endoscopy (EGD).    Your pathology report was:  SPECIMEN(S):   A: Gastroesophageal junction biopsy   B: Antral biopsy     FINAL DIAGNOSIS:   A: Gastroesophageal junction: Biopsy:   - Squamous mucosa with reflux esophagitis   - Columnar mucosa with minimal chronic inflammation, negative for   intestinal metaplasia and dysplasia     B: Stomach, antrum: Biopsy:   - Antral-type mucosa within normal limits   - No Helicobacter pylori-like organisms seen on H&E examination   Showed findings consistent with reflux (heartburn).  I recommend talking to your PCP regarding increasing your omeprazole or changing to a new proton pump inhibitor.      If you have any questions or concerns please do not hesitate to call my office at [phone].  Sincerely,     [facility]placed [facility],   [facility]

## 2021-03-29 NOTE — ANESTHESIA CARE TRANSFER NOTE
Patient: Bryce Diaz    Procedure(s):  COLONOSCOPY  ESOPHAGOGASTRODUODENOSCOPY, WITH BIOPSY    Diagnosis: GERD without esophagitis [K21.9]  Colon cancer screening [Z12.11]  BPH associated with nocturia [N40.1, R35.1]  Anemia, unspecified type [D64.9]  Diagnosis Additional Information: No value filed.    Anesthesia Type:   General     Note:    Oropharynx: spontaneously breathing  Level of Consciousness: drowsy  Oxygen Supplementation: nasal cannula    Independent Airway: airway patency satisfactory and stable  Dentition: dentition unchanged  Vital Signs Stable: post-procedure vital signs reviewed and stable  Report to RN Given: handoff report given  Patient transferred to: Phase II    Handoff Report: Identifed the Patient, Identified the Reponsible Provider, Reviewed the pertinent medical history, Discussed the surgical course, Reviewed Intra-OP anesthesia mangement and issues during anesthesia, Set expectations for post-procedure period and Allowed opportunity for questions and acknowledgement of understanding      Vitals: (Last set prior to Anesthesia Care Transfer)  CRNA VITALS  3/29/2021 1304 - 3/29/2021 1335      3/29/2021             Pulse:  68    SpO2:  93 %        Electronically Signed By: Alexandr Pagan CRNA, APRN CRNA  March 29, 2021  1:35 PM

## 2021-03-30 PROBLEM — K57.30 DIVERTICULOSIS OF LARGE INTESTINE WITHOUT HEMORRHAGE: Status: ACTIVE | Noted: 2021-03-30

## 2021-03-30 NOTE — TELEPHONE ENCOUNTER
Pt is calling.    Colonoscopy and endoscopy today.  Feels hot and cold. Oral temp of 101.  No other symptoms. Denies body aches, headache or any other pain.   No bleeding. COVID vaccine 3 weeks ago.  Had recent COVID test and was negative.  After care information advised to call with fever. I advised him that I would page the on call PCP and then call him back.    10:54pm-Page to Dr Kerns, who is on call.  11:04pm-Call back from Dr Kerns. Take Tylenol now for fever. Send message to PCP for follow up tomorrow.  TO ED with any difficulty breathing, chest pain, bleeding. Follow up with PCP tomorrow.  11:07pm-Call back to the patient. I advised him of what Dr Kerns advised. 2 Tylenol every 4 hours as needed for fever. Push fluids as he may be dehydrated from today. To ED with any shortness of breath, chest pain, or bleeding. If fever continues tomorrow, then call back to PCP's office for further evaluation.   He verbalized understanding.    Additional Information    Negative: Shock suspected (e.g., cold/pale/clammy skin, too weak to stand, low BP, rapid pulse)    Negative: Difficult to awaken or acting confused (e.g., disoriented, slurred speech)    Negative: [1] Difficulty breathing AND [2] bluish lips, tongue or face    Negative: New onset rash with multiple purple (or blood-colored) spots or dots    Negative: Sounds like a life-threatening emergency to the triager    Negative: Fever in a cancer patient who is currently (or recently) receiving chemotherapy or radiation therapy, or cancer patient who has metastatic or end-stage cancer and is receiving palliative care    Negative: Pregnant    Negative: Postpartum (from 0 to 6 weeks after delivery)    Negative: Fever onset within 24 hours of receiving VACCINE    Negative: [1] Fever AND [2] within 21 days of Ebola EXPOSURE    Negative: Other symptom is present, see that guideline  (e.g., symptoms of cough, runny nose, sore throat, earache, abdominal pain, diarrhea,  vomiting)    Negative: [1] Headache AND [2] stiff neck (can't touch chin to chest)    Negative: Difficulty breathing    Negative: IV drug abuse    Negative: [1] Drinking very little AND [2] dehydration suspected (e.g., no urine > 12 hours, very dry mouth, very lightheaded)    Negative: Patient sounds very sick or weak to the triager  (Exception: mild weakness and hasn't taken fever medicine)    [1] Fever > 101 F (38.3 C) AND [2] age > 60    Negative: Fever > 104 F (40 C)    Protocols used: FEVER-A-    Yumiko Park RN  [facility] Nurse Advisor  3/29/2021 at 10:50 PM

## 2021-03-31 LAB — COPATH REPORT: NORMAL

## 2021-05-06 DIAGNOSIS — K21.9 GASTROESOPHAGEAL REFLUX DISEASE WITHOUT ESOPHAGITIS: ICD-10-CM

## 2021-05-07 RX ORDER — OMEPRAZOLE 40 MG/1
40 CAPSULE, DELAYED RELEASE ORAL 2 TIMES DAILY
Qty: 60 CAPSULE | Refills: 0 | Status: SHIPPED | OUTPATIENT
Start: 2021-05-07 | End: 2021-07-19

## 2021-05-07 NOTE — TELEPHONE ENCOUNTER
States taking omeprazole 40 mg BID per PCP instructions if sx not controlled with daily Rx.  States GERD sx waking him up at night to increased to BID.  Please advise direction change.  LUIZ Husain RN

## 2021-07-17 DIAGNOSIS — K21.9 GASTROESOPHAGEAL REFLUX DISEASE WITHOUT ESOPHAGITIS: ICD-10-CM

## 2021-07-19 RX ORDER — OMEPRAZOLE 40 MG/1
CAPSULE, DELAYED RELEASE ORAL
Qty: 60 CAPSULE | Refills: 0 | Status: SHIPPED | OUTPATIENT
Start: 2021-07-19 | End: 2021-09-15

## 2021-09-15 ENCOUNTER — OFFICE VISIT (OUTPATIENT)
Dept: FAMILY MEDICINE | Facility: CLINIC | Age: 66
End: 2021-09-15
Payer: COMMERCIAL

## 2021-09-15 ENCOUNTER — ANCILLARY PROCEDURE (OUTPATIENT)
Dept: GENERAL RADIOLOGY | Facility: CLINIC | Age: 66
End: 2021-09-15
Attending: NURSE PRACTITIONER
Payer: COMMERCIAL

## 2021-09-15 VITALS
OXYGEN SATURATION: 99 % | WEIGHT: 184 LBS | RESPIRATION RATE: 14 BRPM | SYSTOLIC BLOOD PRESSURE: 116 MMHG | BODY MASS INDEX: 28.82 KG/M2 | TEMPERATURE: 97.3 F | DIASTOLIC BLOOD PRESSURE: 68 MMHG | HEART RATE: 81 BPM

## 2021-09-15 DIAGNOSIS — M25.511 CHRONIC RIGHT SHOULDER PAIN: Primary | ICD-10-CM

## 2021-09-15 DIAGNOSIS — G89.29 CHRONIC RIGHT SHOULDER PAIN: Primary | ICD-10-CM

## 2021-09-15 DIAGNOSIS — G89.29 CHRONIC RIGHT SHOULDER PAIN: ICD-10-CM

## 2021-09-15 DIAGNOSIS — K21.9 GASTROESOPHAGEAL REFLUX DISEASE WITHOUT ESOPHAGITIS: ICD-10-CM

## 2021-09-15 DIAGNOSIS — M25.511 CHRONIC RIGHT SHOULDER PAIN: ICD-10-CM

## 2021-09-15 DIAGNOSIS — M75.51 BURSITIS OF RIGHT SHOULDER: ICD-10-CM

## 2021-09-15 PROCEDURE — 20610 DRAIN/INJ JOINT/BURSA W/O US: CPT | Mod: RT | Performed by: NURSE PRACTITIONER

## 2021-09-15 PROCEDURE — 99214 OFFICE O/P EST MOD 30 MIN: CPT | Mod: 25 | Performed by: NURSE PRACTITIONER

## 2021-09-15 PROCEDURE — 73030 X-RAY EXAM OF SHOULDER: CPT | Mod: RT | Performed by: RADIOLOGY

## 2021-09-15 RX ORDER — OMEPRAZOLE 40 MG/1
CAPSULE, DELAYED RELEASE ORAL
Qty: 60 CAPSULE | Refills: 5 | Status: SHIPPED | OUTPATIENT
Start: 2021-09-15 | End: 2022-04-28

## 2021-09-15 RX ORDER — LIDOCAINE HYDROCHLORIDE 10 MG/ML
3 INJECTION, SOLUTION INFILTRATION; PERINEURAL ONCE
Status: COMPLETED | OUTPATIENT
Start: 2021-09-15 | End: 2021-09-15

## 2021-09-15 RX ORDER — TRIAMCINOLONE ACETONIDE 40 MG/ML
40 INJECTION, SUSPENSION INTRA-ARTICULAR; INTRAMUSCULAR ONCE
Status: COMPLETED | OUTPATIENT
Start: 2021-09-15 | End: 2021-09-15

## 2021-09-15 RX ADMIN — TRIAMCINOLONE ACETONIDE 40 MG: 40 INJECTION, SUSPENSION INTRA-ARTICULAR; INTRAMUSCULAR at 16:35

## 2021-09-15 RX ADMIN — LIDOCAINE HYDROCHLORIDE 3 ML: 10 INJECTION, SOLUTION INFILTRATION; PERINEURAL at 16:35

## 2021-09-15 ASSESSMENT — PAIN SCALES - GENERAL: PAINLEVEL: SEVERE PAIN (7)

## 2021-09-15 NOTE — PROGRESS NOTES
Assessment & Plan     Gastroesophageal reflux disease without esophagitis  Continue  - omeprazole (PRILOSEC) 40 MG DR capsule  Dispense: 60 capsule; Refill: 5  EGD with long term use of PPI yearly    Chronic right shoulder pain  Symptomatic care strategies reviewd.   Xray today. Cortisone injection   - XR Shoulder Right 2 Views  - Large Joint/Bursa injection and/or drainage (Shoulder, Knee)  - triamcinolone (KENALOG-40) injection 40 mg  - lidocaine 1 % injection 3 mL    Bursitis of right shoulder  After verbal consent obtained   A steroid injection was performed at posterior right shoulder using 1% plain Lidocaine and 40 mg of Kenalog. This was well tolerated.    - Large Joint/Bursa injection and/or drainage (Shoulder, Knee)  - triamcinolone (KENALOG-40) injection 40 mg  - lidocaine 1 % injection 3 mL        Repeat PSA and CBC due in March. Patient aware   Call or return to the clinic with any worsening of symptoms or no resolution. Patient/Parent verbalized understanding and is in agreement. Medication side effects reviewed.   Current Outpatient Medications   Medication Sig Dispense Refill     acetaminophen (TYLENOL) 325 MG tablet Take 2 tablets (650 mg) by mouth every 4 hours as needed for mild pain       carvedilol (COREG) 6.25 MG tablet Take 1 tablet (6.25 mg) by mouth 2 times daily 180 tablet 1     diclofenac (VOLTAREN) 1 % topical gel Apply 4 g topically 4 times daily 100 g 5     losartan (COZAAR) 100 MG tablet Take 1 tablet (100 mg) by mouth daily 90 tablet 1     omeprazole (PRILOSEC) 40 MG DR capsule TAKE 1 CAPSULE BY MOUTH 2 TIMES DAILY 60 capsule 5     tamsulosin (FLOMAX) 0.4 MG capsule Take 1 capsule (0.4 mg) by mouth daily 90 capsule 1     cimetidine (TAGAMET) 400 MG tablet Take 1 tablet (400 mg) by mouth 2 times daily 14 tablet 0     Chart documentation with Dragon Voice recognition Software. Although reviewed after completion, some words and grammatical errors may remain.    Lenore Cornejo,  APRN CNP  [facility]    Denise Ugarte is a 66 year old who presents for the following health issues     HPI     Musculoskeletal problem/pain  Wants юлия injection  Limited range of motion and ability to sleep due to pain  Last steroid injection lasted 1 1/2 yrs  Onset/Duration: 6 months   Description  Location: Right shoulder  - right  Joint Swelling: no  Redness: no  Pain: YES  Warmth: no  Intensity:  moderate  Progression of Symptoms:  worsening  Accompanying signs and symptoms:   Fevers: no  Numbness/tingling/weakness: no  History  Trauma to the area: no  Recent illness:  no  Previous similar problem: YES  Previous evaluation:  YES  Precipitating or alleviating factors:  Aggravating factors include: overuse painting at work   Therapies tried and outcome: rest/inactivity    Review of Systems   Constitutional, HEENT, cardiovascular, pulmonary, GI, , musculoskeletal, neuro, skin, endocrine and psych systems are negative, except as otherwise noted.      Objective    /68   Pulse 81   Temp 97.3  F (36.3  C) (Tympanic)   Resp 14   Wt 83.5 kg (184 lb)   SpO2 99%   BMI 28.82 kg/m    Body mass index is 28.82 kg/m .  Physical Exam   GENERAL: healthy, alert and no distress  EYES: Eyes grossly normal to inspection, PERRL and conjunctivae and sclerae normal  HENT: ear canals and TM's normal, nose and mouth without ulcers or lesions  NECK: no adenopathy, no asymmetry, masses, or scars and thyroid normal to palpation  RESP: lungs clear to auscultation - no rales, rhonchi or wheezes  CV: regular rate and rhythm, normal S1 S2, no S3 or S4, no murmur, click or rub, no peripheral edema and peripheral pulses strong  ABDOMEN: soft, nontender, no hepatosplenomegaly, no masses and bowel sounds normal  MS: decreased range of motion right shoulder , peripheral pulses normal and tenderness to palpation right shoulder   SKIN: no suspicious lesions or rashes  NEURO: Normal strength and tone,  mentation intact and speech normal  PSYCH: mentation appears normal, affect normal/bright    Results for orders placed or performed in visit on 06/26/19   XR Shoulder Right 2 Views    Narrative    SHOULDER TWO VIEWS RIGHT   6/26/2019 5:06 PM     HISTORY: Acute pain of right shoulder    COMPARISON: None.    FINDINGS: No fracture or dislocation is identified. Mild degenerative  change is present at the acromioclavicular joint. The glenohumeral  joint is maintained. Soft tissues are unremarkable. Cervical hardware  noted.      Impression    IMPRESSION: No acute osseous abnormality.    ISIAH HARLEY MD

## 2021-10-12 ENCOUNTER — ANCILLARY PROCEDURE (OUTPATIENT)
Dept: GENERAL RADIOLOGY | Facility: CLINIC | Age: 66
End: 2021-10-12
Attending: NURSE PRACTITIONER
Payer: COMMERCIAL

## 2021-10-12 ENCOUNTER — OFFICE VISIT (OUTPATIENT)
Dept: FAMILY MEDICINE | Facility: CLINIC | Age: 66
End: 2021-10-12
Payer: COMMERCIAL

## 2021-10-12 VITALS
SYSTOLIC BLOOD PRESSURE: 120 MMHG | DIASTOLIC BLOOD PRESSURE: 84 MMHG | RESPIRATION RATE: 18 BRPM | TEMPERATURE: 97.8 F | BODY MASS INDEX: 28.09 KG/M2 | HEART RATE: 72 BPM | HEIGHT: 67 IN | WEIGHT: 179 LBS

## 2021-10-12 DIAGNOSIS — G47.00 PERSISTENT INSOMNIA: ICD-10-CM

## 2021-10-12 DIAGNOSIS — N40.1 BENIGN PROSTATIC HYPERPLASIA WITH NOCTURIA: ICD-10-CM

## 2021-10-12 DIAGNOSIS — R35.1 BENIGN PROSTATIC HYPERPLASIA WITH NOCTURIA: ICD-10-CM

## 2021-10-12 DIAGNOSIS — M25.511 ACUTE PAIN OF RIGHT SHOULDER: ICD-10-CM

## 2021-10-12 DIAGNOSIS — I10 HYPERTENSION GOAL BP (BLOOD PRESSURE) < 140/90: ICD-10-CM

## 2021-10-12 DIAGNOSIS — M25.511 ACUTE PAIN OF RIGHT SHOULDER: Primary | ICD-10-CM

## 2021-10-12 PROCEDURE — A4565 SLINGS: HCPCS | Performed by: NURSE PRACTITIONER

## 2021-10-12 PROCEDURE — 99214 OFFICE O/P EST MOD 30 MIN: CPT | Performed by: NURSE PRACTITIONER

## 2021-10-12 PROCEDURE — 73030 X-RAY EXAM OF SHOULDER: CPT | Mod: RT | Performed by: RADIOLOGY

## 2021-10-12 RX ORDER — METHOCARBAMOL 500 MG/1
500 TABLET, FILM COATED ORAL 3 TIMES DAILY PRN
Qty: 60 TABLET | Refills: 0 | Status: SHIPPED | OUTPATIENT
Start: 2021-10-12 | End: 2021-12-15

## 2021-10-12 RX ORDER — TAMSULOSIN HYDROCHLORIDE 0.4 MG/1
0.4 CAPSULE ORAL DAILY
Qty: 90 CAPSULE | Refills: 1 | Status: SHIPPED | OUTPATIENT
Start: 2021-10-12 | End: 2022-04-28

## 2021-10-12 RX ORDER — CARVEDILOL 6.25 MG/1
6.25 TABLET ORAL 2 TIMES DAILY
Qty: 180 TABLET | Refills: 1 | Status: SHIPPED | OUTPATIENT
Start: 2021-10-12 | End: 2022-04-28

## 2021-10-12 RX ORDER — LOSARTAN POTASSIUM 100 MG/1
100 TABLET ORAL DAILY
Qty: 90 TABLET | Refills: 1 | Status: SHIPPED | OUTPATIENT
Start: 2021-10-12 | End: 2022-04-28

## 2021-10-12 RX ORDER — TRAZODONE HYDROCHLORIDE 50 MG/1
50-100 TABLET, FILM COATED ORAL AT BEDTIME
Qty: 90 TABLET | Refills: 1 | Status: SHIPPED | OUTPATIENT
Start: 2021-10-12 | End: 2022-04-28

## 2021-10-12 ASSESSMENT — MIFFLIN-ST. JEOR: SCORE: 1560.57

## 2021-10-12 ASSESSMENT — PAIN SCALES - GENERAL: PAINLEVEL: NO PAIN (0)

## 2021-10-12 ASSESSMENT — PATIENT HEALTH QUESTIONNAIRE - PHQ9: SUM OF ALL RESPONSES TO PHQ QUESTIONS 1-9: 9

## 2021-10-12 NOTE — NURSING NOTE
"Chief Complaint   Patient presents with     Shoulder Pain       Initial /84   Pulse 72   Temp 97.8  F (36.6  C) (Tympanic)   Resp 18   Ht 1.702 m (5' 7\")   Wt 81.2 kg (179 lb)   BMI 28.04 kg/m   Estimated body mass index is 28.04 kg/m  as calculated from the following:    Height as of this encounter: 1.702 m (5' 7\").    Weight as of this encounter: 81.2 kg (179 lb).    Patient presents to the clinic using     Health Maintenance that is potentially due pending provider review:          Is there anyone who you would like to be able to receive your results?   If yes have patient fill out WIL    "

## 2021-10-12 NOTE — PROGRESS NOTES
Assessment & Plan     Acute pain of right shoulder  Injury  - XR Shoulder Right 2 Views  Sling for comfort  Rest, ice, elevate  - Orthopedic  Referral  Trial new medication  - methocarbamol (ROBAXIN) 500 MG tablet  Dispense: 60 tablet; Refill: 0  Tylenol Extra Strength up to 3500 mg daily  - ARM SLING L    Persistent insomnia  Recurrent.  Restart trazodone.  50 to 100 mg at at bedtime as needed  - traZODone (DESYREL) 50 MG tablet  Dispense: 90 tablet; Refill: 1    Hypertension goal BP (blood pressure) < 140/90  Blood pressure stable.  Continue current meds  - carvedilol (COREG) 6.25 MG tablet  Dispense: 180 tablet; Refill: 1  - losartan (COZAAR) 100 MG tablet  Dispense: 90 tablet; Refill: 1    Benign prostatic hyperplasia with nocturia  BPH is stable nocturia are improved.  Continue meds  - tamsulosin (FLOMAX) 0.4 MG capsule  Dispense: 90 capsule; Refill: 1      Call or return to the clinic with any worsening of symptoms or no resolution. Patient/Parent verbalized understanding and is in agreement. Medication side effects reviewed.   Current Outpatient Medications   Medication Sig Dispense Refill     carvedilol (COREG) 6.25 MG tablet Take 1 tablet (6.25 mg) by mouth 2 times daily 180 tablet 1     losartan (COZAAR) 100 MG tablet Take 1 tablet (100 mg) by mouth daily 90 tablet 1     methocarbamol (ROBAXIN) 500 MG tablet Take 1 tablet (500 mg) by mouth 3 times daily as needed for muscle spasms 60 tablet 0     omeprazole (PRILOSEC) 40 MG DR capsule TAKE 1 CAPSULE BY MOUTH 2 TIMES DAILY 60 capsule 5     tamsulosin (FLOMAX) 0.4 MG capsule Take 1 capsule (0.4 mg) by mouth daily 90 capsule 1     traZODone (DESYREL) 50 MG tablet Take 1-2 tablets ( mg) by mouth At Bedtime 90 tablet 1     acetaminophen (TYLENOL) 325 MG tablet Take 2 tablets (650 mg) by mouth every 4 hours as needed for mild pain       diclofenac (VOLTAREN) 1 % topical gel Apply 4 g topically 4 times daily 100 g 5     Chart documentation with  "Dragon Voice recognition Software. Although reviewed after completion, some words and grammatical errors may remain.      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   Stretching and the right arm went backward when the rest of the body went forward.  Severe bruising started 4 days after the injury.   [place] a pop.  Thinks after the injection the right shoulder was feeling really good until this injury.   Description  Location: rt shoulder -     Joint Swelling: no  Redness: no  Pain: YES  Warmth: no  Bruising  Intensity:  mild, moderate can be sever at times  Progression of Symptoms:  waxing and waning  Accompanying signs and symptoms:   Fevers: no  Numbness/tingling/weakness: no  History  Trauma to the area: YES  Recent illness:  no  Previous similar problem: no  Previous evaluation:  no  Precipitating or alleviating factors:  Aggravating factors include: lifting trying to put hand up  Therapies tried and outcome: tylenol ibuprofen Aleve.        Review of Systems   Constitutional, HEENT, cardiovascular, pulmonary, GI, , musculoskeletal, neuro, skin, endocrine and psych systems are negative, except as otherwise noted.      Objective    /84   Pulse 72   Temp 97.8  F (36.6  C) (Tympanic)   Resp 18   Ht 1.702 m (5' 7\")   Wt 81.2 kg (179 lb)   BMI 28.04 kg/m    Body mass index is 28.04 kg/m .  Physical Exam   GENERAL: healthy, alert and no distress  EYES: Eyes grossly normal to inspection, PERRL and conjunctivae and sclerae normal  HENT: ear canals and TM's normal, nose and mouth without ulcers or lesions  NECK: no " adenopathy, no asymmetry, masses, or scars and thyroid normal to palpation  RESP: lungs clear to auscultation - no rales, rhonchi or wheezes  CV: regular rate and rhythm, normal S1 S2, no S3 or S4, no murmur, click or rub, no peripheral edema and peripheral pulses strong  ABDOMEN: soft, nontender, no hepatosplenomegaly, no masses and bowel sounds normal  MS: decreased range of motion right shoulder.  Unable to get the right arm up without the assistance of the left.  Bruising noted throughout his whole upper bicep, peripheral pulses normal and tenderness to palpation right AC joint  SKIN: no suspicious lesions or rashes  NEURO: Normal strength and tone, mentation intact and speech normal  PSYCH: mentation appears normal, affect normal/bright    X-ray today.  No evidence of fracture.  Pending radiology review

## 2021-10-12 NOTE — LETTER
October 12, 2021      Bryce Diaz  [address] [address]        To Whom It May Concern:    Bryce Diaz was seen in our clinic. He may return to work with the following: Restrict- limit over head movement of the RIGHT ARM. Sling for comfort until appointment with Ortho .      Sincerely,        DES Mejia CNP

## 2021-10-12 NOTE — PATIENT INSTRUCTIONS
Patient Education     BPH (Enlarged Prostate)   The prostate is a gland at the base of the bladder. As some men get older, the prostate may get bigger. This problem is called benign prostatic hyperplasia (BPH). BPH puts pressure on the urethra. This is the tube that carries urine from the bladder to the penis. It may interfere with the flow of urine. It may also keep the bladder from emptying fully.    Symptoms of BPH include trouble starting urination and feeling as though the bladder isn t emptying all the way. It also includes a weak urine stream, dribbling and leaking of urine, and frequent and urgent urination (especially at night). BPH can increase the risk of urinary infections. It can also block off urine flow completely. If this occurs, a thin tube (catheter) may be passed into the bladder to help drain urine.  If symptoms are mild, no treatment may be needed right now. If symptoms are more severe, treatment is likely needed. The goal of treatment is to improve urine flow and reduce symptoms. Treatments can include medicine and procedures. Your healthcare provider will talk about treatment options with you as needed.  Home care  The following guidelines will help you care for yourself at home:    Urinate as soon as you feel the urge. Don't try to hold your urine.    Don't limit your fluid intake during the day. Drink 6 to 8 glasses of water or liquids a day. This prevents bacteria from building up in the bladder.    Don't drink fluids after dinner. This helps to reduce urination during the night.    Don't take medicines that can make your symptoms worse. These include certain cold and allergy medicines and antidepressants. Diuretics used for high blood pressure can also make symptoms worse. Talk with your healthcare provider about the medicines you take. Other choices may work better for you.  Prostate cancer screening  BPH does not increase the risk of prostate cancer. But because prostate cancer is a  common cancer in men, screening is sometimes advised. This may help find the cancer in its early stages when treatment is most effective. Factors that can increase the risk of prostate cancer include being  or having a father or brother who had prostate cancer. A high-fat diet may also increase the risk of prostate cancer. Talk with your healthcare provider to see if you should be screened for prostate cancer.  Follow-up care  Follow up with your healthcare provider, or as advised  To learn more, go to:    National Kidney & Urologic Diseases Information Clearinghouse kidney.niddk.nih.gov, 422.994.2882  When to get medical advice  Call your healthcare provider right away if any of these occur:    Fever of 100.4 F (38.0 C) or higher, or as advised    Unable to pass urine for 8 hours    More pressure or pain in your lower belly (bladder)    Blood in the urine    Increasing low back pain that is not linked to injury    Symptoms of urinary infection (increased urge to urinate, burning when passing urine, bad-smelling urine)  [employer] last reviewed this educational content on 11/1/2019 2000-2021 [employer]. All rights reserved. This information is not intended as a substitute for professional medical care. Always follow your healthcare professional's instructions.           Patient Education     Shoulder Sprain   A sprain is a stretching or tearing of the ligaments that hold a joint together. A sprain may take up to 8 weeks to fully heal, depending on how severe it is. Moderate to severe shoulder sprains are treated with a sling or shoulder immobilizer. Minor sprains can be treated without any special support.  Home care  The following guidelines will help you care for your injury at home:    If a sling was given to you, leave it in place for the time advised by your healthcare provider. If you aren t sure how long to wear it, ask for advice. If the sling becomes loose, adjust it so that  your forearm is parallel to the ground. Your shoulder should feel well supported.    Put an ice pack on the injured area for 20 minutes every 1 to 2 hours the first day. You can make your own ice pack by putting ice cubes in a plastic bag. A bag of frozen peas or something similar works well too. Wrap the bag in a thin towel. Continue with ice packs 3 to 4 times a day for the next 2 to 3 days. Then use the pack as needed to ease pain and swelling.    You may use acetaminophen or ibuprofen to control pain, unless another pain medicine was prescribed. If you have chronic liver or kidney disease, talk with your healthcare provider before using these medicines. Also talk with your provider if you ve had a stomach ulcer or gastrointestinal bleeding.    Shoulder joints become stiff if left in a sling for too long. You should start range of motion exercises usually about 7 to 10 days after the injury. Talk with your provider to find out what type of exercises to do and how soon to start.  Follow-up care  Follow up with your healthcare provider, or as advised.  Any X-rays you had today don t show any broken bones, breaks, or fractures. Sometimes fractures don t show up on the first X-ray. Bruises and sprains can sometimes hurt as much as a fracture. These injuries can take time to heal completely. If your symptoms don t improve or they get worse, talk with your provider. You may need a repeat X-ray or other treatments.  When to seek medical advice  Call your healthcare provider right away if any of these occur:    Shoulder pain or swelling in your arm that gets worse    Fingers become cold, blue, numb, or tingly    Large amount of bruising of the shoulder or upper arm    Fever or chills  [employer] last reviewed this educational content on 11/1/2019 2000-2021 [employer]. All rights reserved. This information is not intended as a substitute for professional medical care. Always follow your healthcare  professional's instructions.

## 2021-10-22 ENCOUNTER — OFFICE VISIT (OUTPATIENT)
Dept: ORTHOPEDICS | Facility: CLINIC | Age: 66
End: 2021-10-22
Payer: COMMERCIAL

## 2021-10-22 VITALS — SYSTOLIC BLOOD PRESSURE: 118 MMHG | DIASTOLIC BLOOD PRESSURE: 84 MMHG

## 2021-10-22 DIAGNOSIS — M25.511 ACUTE PAIN OF RIGHT SHOULDER: ICD-10-CM

## 2021-10-22 DIAGNOSIS — S46.011A TRAUMATIC COMPLETE TEAR OF RIGHT ROTATOR CUFF, INITIAL ENCOUNTER: Primary | ICD-10-CM

## 2021-10-22 PROCEDURE — 99204 OFFICE O/P NEW MOD 45 MIN: CPT | Performed by: FAMILY MEDICINE

## 2021-10-22 NOTE — PATIENT INSTRUCTIONS
# Right Rotator Cuff Tear: Notable after an injury on 10/5/2021.  There is pain over the anterior lateral portion of shoulder and unable to move his shoulder due to pain and weakness.  There is pain and weakness on rotator cuff testing with ultrasound showing significant tears of the rotator cuff tendons.  Given this plan to get MRI and follow-up in person afterwards to discuss results and plan.  Image Findings: no shoulder arthritis, rotator cuff tears on ultrasound  Treatment: Activities as tolerated, home exercises given today  Job: as tolerated  Medications/Injections: Limited tylenol/ibuprofen for pain for 1-2 weeks, defer steroid injection  Follow-up: after right shoulder MRI    Please call [phone]   Ask for my team if you have any questions or concerns    MRI Scheduling Appointments  Call: [phone]  Toll-Free: [phone]  Fax: [phone]    If you have not yet received the influenza vaccine but would like to get one, please call  [phone] or you can schedule via [employer]    It was great seeing you today!    Ferdinand Grimm MD, CAQSM     Patient Education     Rotator Cuff Tear  The rotator cuff is a group of muscles and tendons that surround the shoulder joint. These muscles and tendons hold the arm in its joint. They also help the shoulder move and rotate. The rotator cuff can be torn from overuse or injury. Gradual wear and tear can lead to inflammation of these tendons. This can progress to gradual or sudden tears.  Symptoms of a torn rotator cuff include:    Shoulder pain that gets worse when you raise your arm overhead    Weakness of the shoulder muscles with overhead activity    Popping and clicking when you move your shoulder    Shoulder pain that wakes you up at night when sleeping on the hurt shoulder  Your healthcare provider may suspect a rotator cuff injury based on your symptoms and a physical exam. You may also have an MRI or arthroscopy. Arthroscopy is a surgical  procedure to look inside the joint through a small tube. X-rays may be taken to determine if there is another reason for your pain, such as an abnormality in the bone.  Partial rotator cuff tears can be treated by first resting, then strengthening the rotator cuff muscles. Anti-inflammatory medicines, such as ibuprofen or naproxen, are useful. Your healthcare provider can give you a limited number of steroid injections. Your provider may recommend surgery for complete tears and partial tears that don't respond to medical treatment.  Home care    Try to avoid activities that make your pain worse. This includes overhead activities, doing the same motion over and over, and heavy lifting.    You may use over-the-counter pain medicines to control pain, unless another medicine was prescribed. If you have chronic liver or kidney disease or ever had a stomach ulcer or gastrointestinal bleeding, talk with your healthcare provider before using these medicines.    If you were given a sling, use it for comfort. After your pain decreases, don t keep your arm in the sling all the time. Take your arm out several times a day and move the shoulder joint, as you are able.    Your healthcare provider may recommend gentle pendulum exercises. Stand or sit with your arm vertical and close to your side. Relax your shoulder muscles and gently swing the arm forward and back, side to side, and in small circles for about 5 minutes. Do this once or twice a day. There should be only slight pain with this exercise.    You may benefit from physical therapy or a home exercise program to strengthen your shoulder muscles. This will also increase your pain-free range of motion. Applying heat before exercises can help prepare the muscles and joint for activity. Talk to your healthcare provider about what is best for your condition.  Follow-up care  Follow up with your healthcare provider, or as advised.  When to seek medical advice  Call your  healthcare provider right away if the following occurs:    Increasing shoulder pain or pain radiating down the arm to the hand  Call 911  Call 911 or get immediate medical care if any of the following occur:    Rapid swelling in the involved shoulder or arm    Numbness, tingling, or loss of strength down the arm to the hand  [employer] last reviewed this educational content on 5/1/2018 2000-2021 [employer]. All rights reserved. This information is not intended as a substitute for professional medical care. Always follow your healthcare professional's instructions.

## 2021-10-22 NOTE — LETTER
10/22/2021         RE: Bryce Diaz  [address] [address]        Dear Colleague,    Thank you for referring your patient, Bryce Diaz, to the [facility]. Please see a copy of my visit note below.    ASSESSMENT & PLAN    Bryce was seen today for pain.    Diagnoses and all orders for this visit:    Traumatic complete tear of right rotator cuff, initial encounter  -     MR Shoulder Right w/o Contrast; Future    Acute pain of right shoulder  -     Orthopedic  Referral  -     MR Shoulder Right w/o Contrast; Future      This issue is acute and Worsening.    # Right Rotator Cuff Tear: Notable after an injury on 10/5/2021.  There is pain over the anterior lateral portion of shoulder and unable to move his shoulder due to pain and weakness.  There is pain and weakness on rotator cuff testing with ultrasound showing significant tears of the rotator cuff tendons.  Given this plan to get MRI and follow-up in person afterwards to discuss results and plan.  Image Findings: no shoulder arthritis, rotator cuff tears on ultrasound  Treatment: Activities as tolerated, home exercises given today work on ROM  Job: as tolerated  Medications/Injections: Limited tylenol/ibuprofen for pain for 1-2 weeks, defer steroid injection  Follow-up: after right shoulder MRI      Ferdinand Grimm MD  [facility]    Ultrasound Findings: Completed avulsion and retraction of supraspinatus, complete tear of infraspinatus     Please do not bill    -----  Chief Complaint   Patient presents with     Right Shoulder - Pain       SUBJECTIVE  Bryce Diaz is a/an 66 year old male who is seen in consultation at the request of  Lenore Cornejo C.N.P. for evaluation of right shoulder pain.     The patient is seen by themselves.  The patient is Right handed    Onset: 10/5/21, 2.5 week(s) ago. Patient describes injury as putting item at top  shelf and felt a pop. Saw PCP 10/12/21 and xrays were performed.   Location of Pain: right shoulder   Worsened by: overhead movement, reaching, behind   Better with: rest   Treatments tried: ice,  Ibuprofen, robaxin   Associated symptoms: numbness and tingling (chronic).  Had a steroid injection 1-2 months ago.     Orthopedic/Surgical history: YES - Chronic shoulder pain   Social History/Occupation:      No family history pertinent to patient's problem today.      REVIEW OF SYSTEMS:  Review of Systems  Constitutional, HEENT, cardiovascular, pulmonary, GI, , musculoskeletal, neuro, skin, endocrine and psych systems are negative, except as otherwise noted.    OBJECTIVE:  /84    General: healthy, alert and in no distress  HEENT: no scleral icterus or conjunctival erythema  Skin: no suspicious lesions or rash. No jaundice.  CV: distal perfusion intact    Resp: normal respiratory effort without conversational dyspnea   Psych: normal mood and affect  Gait: normal steady gait with appropriate coordination and balance   Neuro: Normal light sensory exam of right upper extremity     RIGHT SHOULDER  Inspection:    no swelling, bruising, discoloration, or obvious deformity or asymmetry  Palpation:    Tender about the greater tuberosity and supraspinatus insertion. Remainder of bony and tendinous landmarks are nontender.  Active Range of Motion:     Abduction 300, , , IR unable to.    Strength:    Scapular plane abduction unable to lift due to pain/weakness,  ER painful, weakness, IR 4+/5, painful, biceps 5/5, triceps 5/5  Special Tests:  Limited special testing due to pain/weakness of right shoulder    CERVICAL SPINE  Inspection:    normal cervical lordosis present, rounded shoulders, forward head posture  Palpation:    Nontender.  Range of Motion:     Flexion full    Extension full    Right side bend full    Left side bend full    Right rotation full    Left rotation full  Strength:    Full strength  throughout all neck muscles  Special Tests:    Positive: None    Negative: Spurling's (bilateral)           RADIOLOGY:  I independently ordered, visualized and reviewed these images with the patient    EXAM: XR SHOULDER 2 VIEW RIGHT  DATE/TIME: 10/12/2021 4:18 PM      INDICATION: Right-sided shoulder pain.  COMPARISON: 9/15/2021.                                                                      IMPRESSION: Normal joint spacing and alignment.  No fracture.  Postoperative changes in the cervical spine. Surgical clip projecting  over the right neck BX.     KARMA MACE MD     Review of external notes as documented elsewhere in note  Review of the result(s) of each unique test - right shoulder x-ray           Again, thank you for allowing me to participate in the care of your patient.        Sincerely,        Ferdinand Grimm MD

## 2021-10-22 NOTE — PROGRESS NOTES
ASSESSMENT & PLAN    Bryce was seen today for pain.    Diagnoses and all orders for this visit:    Traumatic complete tear of right rotator cuff, initial encounter  -     MR Shoulder Right w/o Contrast; Future    Acute pain of right shoulder  -     Orthopedic  Referral  -     MR Shoulder Right w/o Contrast; Future      This issue is acute and Worsening.    # Right Rotator Cuff Tear: Notable after an injury on 10/5/2021.  There is pain over the anterior lateral portion of shoulder and unable to move his shoulder due to pain and weakness.  There is pain and weakness on rotator cuff testing with ultrasound showing significant tears of the rotator cuff tendons.  Given this plan to get MRI and follow-up in person afterwards to discuss results and plan.  Image Findings: no shoulder arthritis, rotator cuff tears on ultrasound  Treatment: Activities as tolerated, home exercises given today work on ROM  Job: as tolerated  Medications/Injections: Limited tylenol/ibuprofen for pain for 1-2 weeks, defer steroid injection  Follow-up: after right shoulder MRI      Ferdinand Grimm MD  [facility]    Ultrasound Findings: Completed avulsion and retraction of supraspinatus, complete tear of infraspinatus     Please do not bill    -----  Chief Complaint   Patient presents with     Right Shoulder - Pain       SUBJECTIVE  Brycescarlett Diaz is a/an 66 year old male who is seen in consultation at the request of  Lenore Cornejo C.N.P. for evaluation of right shoulder pain.     The patient is seen by themselves.  The patient is Right handed    Onset: 10/5/21, 2.5 week(s) ago. Patient describes injury as putting item at top shelf and felt a pop. Saw PCP 10/12/21 and xrays were performed.   Location of Pain: right shoulder   Worsened by: overhead movement, reaching, behind   Better with: rest   Treatments tried: ice,  Ibuprofen, robaxin   Associated symptoms: numbness and tingling  (chronic).  Had a steroid injection 1-2 months ago.     Orthopedic/Surgical history: YES - Chronic shoulder pain   Social History/Occupation:      No family history pertinent to patient's problem today.      REVIEW OF SYSTEMS:  Review of Systems  Constitutional, HEENT, cardiovascular, pulmonary, GI, , musculoskeletal, neuro, skin, endocrine and psych systems are negative, except as otherwise noted.    OBJECTIVE:  /84    General: healthy, alert and in no distress  HEENT: no scleral icterus or conjunctival erythema  Skin: no suspicious lesions or rash. No jaundice.  CV: distal perfusion intact    Resp: normal respiratory effort without conversational dyspnea   Psych: normal mood and affect  Gait: normal steady gait with appropriate coordination and balance   Neuro: Normal light sensory exam of right upper extremity     RIGHT SHOULDER  Inspection:    no swelling, bruising, discoloration, or obvious deformity or asymmetry  Palpation:    Tender about the greater tuberosity and supraspinatus insertion. Remainder of bony and tendinous landmarks are nontender.  Active Range of Motion:     Abduction 300, , , IR unable to.    Strength:    Scapular plane abduction unable to lift due to pain/weakness,  ER painful, weakness, IR 4+/5, painful, biceps 5/5, triceps 5/5  Special Tests:  Limited special testing due to pain/weakness of right shoulder    CERVICAL SPINE  Inspection:    normal cervical lordosis present, rounded shoulders, forward head posture  Palpation:    Nontender.  Range of Motion:     Flexion full    Extension full    Right side bend full    Left side bend full    Right rotation full    Left rotation full  Strength:    Full strength throughout all neck muscles  Special Tests:    Positive: None    Negative: Spurling's (bilateral)           RADIOLOGY:  I independently ordered, visualized and reviewed these images with the patient    EXAM: XR SHOULDER 2 VIEW RIGHT  DATE/TIME: 10/12/2021 4:18  PM      INDICATION: Right-sided shoulder pain.  COMPARISON: 9/15/2021.                                                                      IMPRESSION: Normal joint spacing and alignment.  No fracture.  Postoperative changes in the cervical spine. Surgical clip projecting  over the right neck BX.     KARMA MACE MD     Review of external notes as documented elsewhere in note  Review of the result(s) of each unique test - right shoulder x-ray

## 2021-10-26 ENCOUNTER — HOSPITAL ENCOUNTER (OUTPATIENT)
Dept: MRI IMAGING | Facility: CLINIC | Age: 66
Discharge: HOME | End: 2021-10-26
Attending: FAMILY MEDICINE | Admitting: FAMILY MEDICINE
Payer: COMMERCIAL

## 2021-10-26 DIAGNOSIS — S46.011A TRAUMATIC COMPLETE TEAR OF RIGHT ROTATOR CUFF, INITIAL ENCOUNTER: ICD-10-CM

## 2021-10-26 DIAGNOSIS — M25.511 ACUTE PAIN OF RIGHT SHOULDER: ICD-10-CM

## 2021-10-26 PROCEDURE — 73221 MRI JOINT UPR EXTREM W/O DYE: CPT | Mod: 26 | Performed by: RADIOLOGY

## 2021-10-26 PROCEDURE — 73221 MRI JOINT UPR EXTREM W/O DYE: CPT | Mod: RT

## 2021-11-05 ENCOUNTER — TELEPHONE (OUTPATIENT)
Dept: ORTHOPEDICS | Facility: CLINIC | Age: 66
End: 2021-11-05

## 2021-11-05 DIAGNOSIS — S46.011A TRAUMATIC COMPLETE TEAR OF RIGHT ROTATOR CUFF, INITIAL ENCOUNTER: Primary | ICD-10-CM

## 2021-11-05 NOTE — TELEPHONE ENCOUNTER
Patient contacted via telephone regarding right shoulder MRI results.  Given findings plan to treat with referral to orthopedic surgery given weakness and that he is a .  Patient understands and agrees with plan.     Ferdinand Grimm MD

## 2021-11-29 ENCOUNTER — TRANSFERRED RECORDS (OUTPATIENT)
Dept: HEALTH INFORMATION MANAGEMENT | Facility: CLINIC | Age: 66
End: 2021-11-29
Payer: COMMERCIAL

## 2021-12-05 DIAGNOSIS — Z11.59 ENCOUNTER FOR SCREENING FOR OTHER VIRAL DISEASES: ICD-10-CM

## 2021-12-14 DIAGNOSIS — M25.511 ACUTE PAIN OF RIGHT SHOULDER: ICD-10-CM

## 2021-12-15 RX ORDER — METHOCARBAMOL 500 MG/1
TABLET, FILM COATED ORAL
Qty: 60 TABLET | Refills: 0 | Status: SHIPPED | OUTPATIENT
Start: 2021-12-15 | End: 2022-02-04

## 2021-12-15 NOTE — TELEPHONE ENCOUNTER
Requested Prescriptions   Pending Prescriptions Disp Refills     methocarbamol (ROBAXIN) 500 MG tablet [Pharmacy Med Name: METHOCARBAM 500MG] 60 tablet 0     Sig: TAKE 1 TABLET BY MOUTH 3 TIMES DAILY AS NEEDED FOR MUSCLE SPASMS       There is no refill protocol information for this order        Last Written Prescription Date:  10/12/21  Last Fill Quantity: 60,  # refills: 0   Last office visit: 10/12/2021 with prescribing provider:     Future Office Visit:

## 2021-12-30 ENCOUNTER — LAB (OUTPATIENT)
Dept: LAB | Facility: CLINIC | Age: 66
End: 2021-12-30
Attending: ORTHOPAEDIC SURGERY
Payer: COMMERCIAL

## 2021-12-30 ENCOUNTER — OFFICE VISIT (OUTPATIENT)
Dept: FAMILY MEDICINE | Facility: CLINIC | Age: 66
End: 2021-12-30
Payer: COMMERCIAL

## 2021-12-30 VITALS
BODY MASS INDEX: 28.35 KG/M2 | TEMPERATURE: 97.4 F | WEIGHT: 181 LBS | HEART RATE: 73 BPM | OXYGEN SATURATION: 98 % | RESPIRATION RATE: 18 BRPM | SYSTOLIC BLOOD PRESSURE: 122 MMHG | DIASTOLIC BLOOD PRESSURE: 76 MMHG

## 2021-12-30 DIAGNOSIS — Z01.818 PREOP GENERAL PHYSICAL EXAM: Primary | ICD-10-CM

## 2021-12-30 DIAGNOSIS — S46.011A TRAUMATIC COMPLETE TEAR OF RIGHT ROTATOR CUFF, INITIAL ENCOUNTER: ICD-10-CM

## 2021-12-30 DIAGNOSIS — Z11.59 ENCOUNTER FOR SCREENING FOR OTHER VIRAL DISEASES: ICD-10-CM

## 2021-12-30 DIAGNOSIS — I10 HYPERTENSION GOAL BP (BLOOD PRESSURE) < 140/90: ICD-10-CM

## 2021-12-30 LAB
ANION GAP SERPL CALCULATED.3IONS-SCNC: 4 MMOL/L (ref 3–14)
BUN SERPL-MCNC: 21 MG/DL (ref 7–30)
CALCIUM SERPL-MCNC: 9.4 MG/DL (ref 8.5–10.1)
CHLORIDE BLD-SCNC: 106 MMOL/L (ref 94–109)
CO2 SERPL-SCNC: 30 MMOL/L (ref 20–32)
CREAT SERPL-MCNC: 0.94 MG/DL (ref 0.66–1.25)
GFR SERPL CREATININE-BSD FRML MDRD: >90 ML/MIN/1.73M2
GLUCOSE BLD-MCNC: 88 MG/DL (ref 70–99)
POTASSIUM BLD-SCNC: 4.6 MMOL/L (ref 3.4–5.3)
SODIUM SERPL-SCNC: 140 MMOL/L (ref 133–144)

## 2021-12-30 PROCEDURE — 80048 BASIC METABOLIC PNL TOTAL CA: CPT | Performed by: NURSE PRACTITIONER

## 2021-12-30 PROCEDURE — U0005 INFEC AGEN DETEC AMPLI PROBE: HCPCS

## 2021-12-30 PROCEDURE — 93000 ELECTROCARDIOGRAM COMPLETE: CPT | Performed by: NURSE PRACTITIONER

## 2021-12-30 PROCEDURE — 99214 OFFICE O/P EST MOD 30 MIN: CPT | Performed by: NURSE PRACTITIONER

## 2021-12-30 PROCEDURE — U0003 INFECTIOUS AGENT DETECTION BY NUCLEIC ACID (DNA OR RNA); SEVERE ACUTE RESPIRATORY SYNDROME CORONAVIRUS 2 (SARS-COV-2) (CORONAVIRUS DISEASE [COVID-19]), AMPLIFIED PROBE TECHNIQUE, MAKING USE OF HIGH THROUGHPUT TECHNOLOGIES AS DESCRIBED BY CMS-2020-01-R: HCPCS

## 2021-12-30 PROCEDURE — 36415 COLL VENOUS BLD VENIPUNCTURE: CPT | Performed by: NURSE PRACTITIONER

## 2021-12-30 ASSESSMENT — PAIN SCALES - GENERAL: PAINLEVEL: EXTREME PAIN (9)

## 2021-12-30 NOTE — PROGRESS NOTES
[facility]  [address] [place] [id number]  Phone: [phone]  Fax: [phone]  Primary Provider: Talya Cornejo  Pre-op Performing Provider: TALYA BENTON    PREOPERATIVE EVALUATION:  Today's date: 12/30/2021    Bryce Diaz is a 66 year old male who presents for a preoperative evaluation.    Surgical Information:  Surgery/Procedure: right Shoulder arthroscopy mini open rotator cuff repair, possible biceps tenotomy  Surgery Location:   Location: [facility] Room: OR 01       Surgeon: Khoi Venegas MD  Surgery Date: 1/3/2021  Time of Surgery: 3:15 pm  Where patient plans to recover: At home with family  Fax number for surgical facility: Note does not need to be faxed, will be available electronically in Epic.    Type of Anesthesia Anticipated: Combined General with Infraclavicular Block    Assessment & Plan     The proposed surgical procedure is considered INTERMEDIATE risk.    Preop general physical exam  EKG is stable for surgery.  BMP ordered for electrolyte and kidney function due to being on ARB.  CBC not indicated since there is low EBL with arthroscopy surgery.  - Basic metabolic panel  (Ca, Cl, CO2, Creat, Gluc, K, Na, BUN); Future  - EKG 12-lead complete w/read - Clinics  - Basic metabolic panel  (Ca, Cl, CO2, Creat, Gluc, K, Na, BUN)    Traumatic complete tear of right rotator cuff, initial encounter  Stable.    Hypertension goal BP (blood pressure) < 140/90  Stable on medication.  Lab ordered.  - Basic metabolic panel  (Ca, Cl, CO2, Creat, Gluc, K, Na, BUN); Future  - EKG 12-lead complete w/read - Clinics  - Basic metabolic panel  (Ca, Cl, CO2, Creat, Gluc, K, Na, BUN)       Risks and Recommendations:  The patient has the following additional risks and recommendations for perioperative complications:   - No identified additional risk factors other than previously addressed    Medication Instructions:  Patient is to take all scheduled  medications on the day of surgery    RECOMMENDATION:  APPROVAL GIVEN to proceed with proposed procedure pending review of diagnostic evaluation.    Subjective     HPI related to upcoming procedure: Was lifting over his head and arm went back with weight of object and tore the rotator cuff on the right arm.    Preop Questions 12/30/2021   1. Have you ever had a heart attack or stroke? No   2. Have you ever had surgery on your heart or blood vessels, such as a stent placement, a coronary artery bypass, or surgery on an artery in your head, neck, heart, or legs? No   3. Do you have chest pain with activity? No   4. Do you have a history of  heart failure? No   5. Do you currently have a cold, bronchitis or symptoms of other infection? No   6. Do you have a cough, shortness of breath, or wheezing? No   7. Do you or anyone in your family have previous history of blood clots? No   8. Do you or does anyone in your family have a serious bleeding problem such as prolonged bleeding following surgeries or cuts? No   9. Have you ever had problems with anemia or been told to take iron pills? No   10. Have you had any abnormal blood loss such as black, tarry or bloody stools? No   11. Have you ever had a blood transfusion? No   12. Are you willing to have a blood transfusion if it is medically needed before, during, or after your surgery? Yes   13. Have you or any of your relatives ever had problems with anesthesia? No   14. Do you have sleep apnea, excessive snoring or daytime drowsiness? No   15. Do you have any artifical heart valves or other implanted medical devices like a pacemaker, defibrillator, or continuous glucose monitor? No   16. Do you have artificial joints? No   17. Are you allergic to latex? No       Health Care Directive:  Patient does not have a Health Care Directive or Living Will: Discussed advance care planning with patient; however, patient declined at this time.    Preoperative Review of :   reviewed  - no record of controlled substances prescribed.    Status of Chronic Conditions:  HYPERTENSION - Patient has longstanding history of HTN , currently denies any symptoms referable to elevated blood pressure. Specifically denies chest pain, palpitations, dyspnea, orthopnea, PND or peripheral edema. Blood pressure readings have been in normal range. Current medication regimen is as listed below. Patient denies any side effects of medication.       Review of Systems  CONSTITUTIONAL: NEGATIVE for fever, chills, change in weight  INTEGUMENTARY/SKIN: NEGATIVE for worrisome rashes, moles or lesions  EYES: NEGATIVE for vision changes or irritation  ENT/MOUTH: NEGATIVE for ear, mouth and throat problems  RESP: NEGATIVE for significant cough or SOB  CV: NEGATIVE for chest pain, palpitations or peripheral edema  GI: NEGATIVE for nausea, abdominal pain, heartburn, or change in bowel habits  : NEGATIVE for frequency, dysuria, or hematuria  MUSCULOSKELETAL:POSITIVE  for right rotator cuff tear; arthritis  NEURO: NEGATIVE for weakness, dizziness or paresthesias  ENDOCRINE: NEGATIVE for temperature intolerance, skin/hair changes  HEME: NEGATIVE for bleeding problems  PSYCHIATRIC: NEGATIVE for changes in mood or affect    Patient Active Problem List    Diagnosis Date Noted     Diverticulosis of large intestine without hemorrhage 03/30/2021     Priority: Medium     Lumbar stenosis 10/20/2015     Priority: Medium     Mild major depression (H) 03/21/2013     Priority: Medium     BPH (benign prostatic hyperplasia) 03/21/2013     Priority: Medium     Sciatic nerve pain 01/28/2013     Priority: Medium     Advanced directives, counseling/discussion 01/17/2013     Priority: Medium     Discussed advance care planning with patient; information given to patient to review. 1/17/2013          Low back pain radiating to both legs 07/16/2012     Priority: Medium     Intervertebral lumbar disc disorder with myelopathy, lumbar region 07/05/2012      Priority: Medium     Hypertension goal BP (blood pressure) < 140/90 07/12/2011     Priority: Medium     CARDIOVASCULAR SCREENING; LDL GOAL LESS THAN 130 02/10/2011     Priority: Medium     Myalgia and myositis 04/23/2002     Priority: Medium     Problem list name updated by automated process. Provider to review       Esophageal reflux 04/08/2002     Priority: Medium     Attention deficit hyperactivity disorder (ADHD) 04/08/2002     Priority: Medium     Problem list name updated by automated process. Provider to review        Past Medical History:   Diagnosis Date     Backache, unspecified     Hx of back pain     Depressive disorder, not elsewhere classified 1979    committed for suicidal ideation and depression     Depressive disorder, not elsewhere classified 12/18/01    E/P Attention Deficit Disorder - See Sum. From [facility]     Heart disease      Hernia of unspecified site of abdominal cavity without mention of obstruction or gangrene 08/07/1998    Umbilical hernia repair with mesh plug     Hypertension      Past Surgical History:   Procedure Laterality Date     COLONOSCOPY N/A 9/23/2015    Procedure: COLONOSCOPY;  Surgeon: Juan Francisco Cornejo MD;  Location:  GI     COLONOSCOPY N/A 3/29/2021    Procedure: COLONOSCOPY;  Surgeon: Janki Correa MD;  Location: [facility]     ESOPHAGOSCOPY, GASTROSCOPY, DUODENOSCOPY (EGD), COMBINED N/A 3/29/2021    Procedure: ESOPHAGOGASTRODUODENOSCOPY, WITH BIOPSY;  Surgeon: Janki Correa MD;  Location: [facility]     HC RECONSTRUCT TENDON GOLDEN EA, W LOCAL TISSUES  08/07/1998    A-1 pulley release, right fourth finger     HC UGI ENDOSCOPY DIAG W OR W/O BRUSH/WASH  2002    bx. pending  - use proton pump inhibitor indefinitely     HEMILAMINECTOMY, DISCECTOMY LUMBAR TWO LEVELS, COMBINED Right 10/21/2015    Procedure: COMBINED HEMILAMINECTOMY, DISCECTOMY LUMBAR TWO LEVELS;  Surgeon: Jesus Ornelas MD;  Location: [facility]     INJECT EPIDURAL LUMBAR  11/14/13,2/10/14     [facility]     INJECT EPIDURAL LUMBAR  5/13/14,8/5/14    [facility]     INJECT EPIDURAL LUMBAR  12/15/14    [facility]     [facility] APPENDECTOMY      5 years of age     Z NONSPECIFIC PROCEDURE  1983    neck fusion     Current Outpatient Medications   Medication Sig Dispense Refill     acetaminophen (TYLENOL) 325 MG tablet Take 2 tablets (650 mg) by mouth every 4 hours as needed for mild pain       carvedilol (COREG) 6.25 MG tablet Take 1 tablet (6.25 mg) by mouth 2 times daily 180 tablet 1     diclofenac (VOLTAREN) 1 % topical gel Apply 4 g topically 4 times daily 100 g 5     losartan (COZAAR) 100 MG tablet Take 1 tablet (100 mg) by mouth daily 90 tablet 1     methocarbamol (ROBAXIN) 500 MG tablet TAKE 1 TABLET BY MOUTH 3 TIMES DAILY AS NEEDED FOR MUSCLE SPASMS 60 tablet 0     omeprazole (PRILOSEC) 40 MG DR capsule TAKE 1 CAPSULE BY MOUTH 2 TIMES DAILY 60 capsule 5     tamsulosin (FLOMAX) 0.4 MG capsule Take 1 capsule (0.4 mg) by mouth daily 90 capsule 1     traZODone (DESYREL) 50 MG tablet Take 1-2 tablets ( mg) by mouth At Bedtime 90 tablet 1       No Known Allergies     Social History     Tobacco Use     Smoking status: Never Smoker     Smokeless tobacco: Never Used     Tobacco comment: no smoking in the home   Substance Use Topics     Alcohol use: Yes     Comment: rare     Family History   Problem Relation Age of Onset     Cancer Mother         liver but all over     Cancer Father         brain and mouth cancer     Cancer Sister         melanoma     Cancer Brother         Prostate     C.A.D. No family hx of      History   Drug Use No         Objective     /76   Pulse 73   Temp 97.4  F (36.3  C)   Resp 18   Wt 82.1 kg (181 lb)   SpO2 98%   BMI 28.35 kg/m      Physical Exam    GENERAL APPEARANCE: healthy, alert and no distress     EYES: EOMI,  PERRL     HENT: ear canals and TM's normal and nose and mouth without ulcers or lesions     NECK: no adenopathy, no asymmetry, masses, or  scars and thyroid normal to palpation     RESP: lungs clear to auscultation - no rales, rhonchi or wheezes     CV: regular rates and rhythm, normal S1 S2, no S3 or S4 and no murmur, click or rub     ABDOMEN:  soft, nontender, no HSM or masses and bowel sounds normal     MS: normal strength and ROM in left arm; limited exam in right arm; lower extremities have good strength and ROM     SKIN: no suspicious lesions or rashes     NEURO: Normal strength and tone, sensory exam grossly normal, mentation intact and speech normal     PSYCH: mentation appears normal. and affect normal/bright     LYMPHATICS: No cervical adenopathy    Recent Labs   Lab Test 03/04/21  1706   HGB 12.1*         POTASSIUM 4.2   CR 1.08        Diagnostics:  Labs pending at this time.  Results will be reviewed when available.   EKG: appears normal, NSR, normal axis, normal intervals, no acute ST/T changes c/w ischemia, unchanged from previous tracings, borderline LVH    Revised Cardiac Risk Index (RCRI):  The patient has the following serious cardiovascular risks for perioperative complications:   - No serious cardiac risks = 0 points     RCRI Interpretation: 0 points: Class I (very low risk - 0.4% complication rate)    Signed Electronically by: Lenore Mabry NP  Copy of this evaluation report is provided to requesting physician.

## 2021-12-30 NOTE — LETTER
December 31, 2021      Brycescarlett Diaz  [address] [facility] [address]        Dear ,    We are writing to inform you of your test results.    Your test results fall within the expected range(s) or remain unchanged from previous results.  Please continue with current treatment plan.    Resulted Orders   Basic metabolic panel  (Ca, Cl, CO2, Creat, Gluc, K, Na, BUN)   Result Value Ref Range    Sodium 140 133 - 144 mmol/L    Potassium 4.6 3.4 - 5.3 mmol/L    Chloride 106 94 - 109 mmol/L    Carbon Dioxide (CO2) 30 20 - 32 mmol/L    Anion Gap 4 3 - 14 mmol/L    Urea Nitrogen 21 7 - 30 mg/dL    Creatinine 0.94 0.66 - 1.25 mg/dL    Calcium 9.4 8.5 - 10.1 mg/dL    Glucose 88 70 - 99 mg/dL    GFR Estimate >90 >60 mL/min/1.73m2      Comment:      Effective December 21, 2021 eGFRcr in adults is calculated using the 2021 CKD-EPI creatinine equation which includes age and gender (Rohini et al., NEJM, DOI: 10.1056/OFYSxj5065557)       If you have any questions or concerns, please call the clinic at the number listed above.       Sincerely,      Lenore Mabry NP

## 2021-12-30 NOTE — PATIENT INSTRUCTIONS
Preparing for Your Surgery  Getting started  A nurse will call you to review your health history and instructions. They will give you an arrival time based on your scheduled surgery time. Please be ready to share:    Your doctor's clinic name and phone number    Your medical, surgical and anesthesia history    A list of allergies and sensitivities    A list of medicines, including herbal treatments and over-the-counter drugs    Whether the patient has a legal guardian (ask how to send us the papers in advance)  Please tell us if you're pregnant--or if there's any chance you might be pregnant. Some surgeries may injure a fetus (unborn baby), so they require a pregnancy test. Surgeries that are safe for a fetus don't always need a test, and you can choose whether to have one.   If you have a child who's having surgery, please ask for a copy of Preparing for Your Child's Surgery.    Preparing for surgery    Within 30 days of surgery: Have a pre-op exam (sometimes called an H&P, or History and Physical). This can be done at a clinic or pre-operative center.  ? If you're having a , you may not need this exam. Talk to your care team.    At your pre-op exam, talk to your care team about all medicines you take. If you need to stop any medicines before surgery, ask when to start taking them again.  ? We do this for your safety. Many medicines can make you bleed too much during surgery. Some change how well surgery (anesthesia) drugs work.    Call your insurance company to let them know you're having surgery. (If you don't have insurance, call [phone].)    Call your clinic if there's any change in your health. This includes signs of a cold or flu (sore throat, runny nose, cough, rash, fever). It also includes a scrape or scratch near the surgery site.    If you have questions on the day of surgery, call your hospital or surgery center.  COVID testing  You may need to be tested for COVID-19 before having  surgery. If so, we will give you instructions.  Eating and drinking guidelines  For your safety: Unless your surgeon tells you otherwise, follow the guidelines below.    Eat and drink as usual until 8 hours before surgery. After that, no food or milk.    Drink clear liquids until 2 hours before surgery. These are liquids you can see through, like water, Gatorade and Propel Water. You may also have black coffee and tea (no cream or milk).    Nothing by mouth within 2 hours of surgery. This includes gum, candy and breath mints.    If you drink alcohol: Stop drinking it the night before surgery.    If your care team tells you to take medicine on the morning of surgery, it's okay to take it with a sip of water.  Preventing infection    Shower or bathe the night before and morning of your surgery. Follow the instructions your clinic gave you. (If no instructions, use regular soap.)    Don't shave or clip hair near your surgery site. We'll remove the hair if needed.    Don't smoke or vape the morning of surgery. You may chew nicotine gum up to 2 hours before surgery. A nicotine patch is okay.  ? Note: Some surgeries require you to completely quit smoking and nicotine. Check with your surgeon.    Your care team will make every effort to keep you safe from infection. We will:  ? Clean our hands often with soap and water (or an alcohol-based hand rub).  ? Clean the skin at your surgery site with a special soap that kills germs.  ? Give you a special gown to keep you warm. (Cold raises the risk of infection.)  ? Wear special hair covers, masks, gowns and gloves during surgery.  ? Give antibiotic medicine, if prescribed. Not all surgeries need antibiotics.  What to bring on the day of surgery    Photo ID and insurance card    Copy of your health care directive, if you have one    Glasses and hearing aides (bring cases)  ? You can't wear contacts during surgery    Inhaler and eye drops, if you use them (tell us about these when  you arrive)    CPAP machine or breathing device, if you use them    A few personal items, if spending the night    If you have . . .  ? A pacemaker, ICD (cardiac defibrillator) or other implant: Bring the ID card.  ? An implanted stimulator: Bring the remote control.  ? A legal guardian: Bring a copy of the certified (court-stamped) guardianship papers.  Please remove any jewelry, including body piercings. Leave jewelry and other valuables at home.  If you're going home the day of surgery    You must have a responsible adult drive you home. They should stay with you overnight as well.    If you don't have someone to stay with you, and you aren't safe to go home alone, we may keep you overnight. Insurance often won't pay for this.  After surgery  If it's hard to control your pain or you need more pain medicine, please call your surgeon's office.  Questions?   If you have any questions for your care team, list them here: _________________________________________________________________________________________________________________________________________________________________________ ____________________________________ ____________________________________ ____________________________________  For informational purposes only. Not to replace the advice of your health care provider. Copyright   2003, 2019 [facility]. All rights reserved. Clinically reviewed by Madyson Prater MD. ExpertFlyer [id number] - REV 07/21.    Preparing for Your Surgery  Getting started  A nurse will call you to review your health history and instructions. They will give you an arrival time based on your scheduled surgery time. Please be ready to share:    Your doctor's clinic name and phone number    Your medical, surgical and anesthesia history    A list of allergies and sensitivities    A list of medicines, including herbal treatments and over-the-counter drugs    Whether the patient has a legal guardian (ask how to send us the  papers in advance)  Please tell us if you're pregnant--or if there's any chance you might be pregnant. Some surgeries may injure a fetus (unborn baby), so they require a pregnancy test. Surgeries that are safe for a fetus don't always need a test, and you can choose whether to have one.   If you have a child who's having surgery, please ask for a copy of Preparing for Your Child's Surgery.    Preparing for surgery    Within 30 days of surgery: Have a pre-op exam (sometimes called an H&P, or History and Physical). This can be done at a clinic or pre-operative center.  ? If you're having a , you may not need this exam. Talk to your care team.    At your pre-op exam, talk to your care team about all medicines you take. If you need to stop any medicines before surgery, ask when to start taking them again.  ? We do this for your safety. Many medicines can make you bleed too much during surgery. Some change how well surgery (anesthesia) drugs work.    Call your insurance company to let them know you're having surgery. (If you don't have insurance, call [phone].)    Call your clinic if there's any change in your health. This includes signs of a cold or flu (sore throat, runny nose, cough, rash, fever). It also includes a scrape or scratch near the surgery site.    If you have questions on the day of surgery, call your hospital or surgery center.  COVID testing  You may need to be tested for COVID-19 before having surgery. If so, we will give you instructions.  Eating and drinking guidelines  For your safety: Unless your surgeon tells you otherwise, follow the guidelines below.    Eat and drink as usual until 8 hours before surgery. After that, no food or milk.    Drink clear liquids until 2 hours before surgery. These are liquids you can see through, like water, Gatorade and Propel Water. You may also have black coffee and tea (no cream or milk).    Nothing by mouth within 2 hours of surgery. This includes  gum, candy and breath mints.    If you drink alcohol: Stop drinking it the night before surgery.    If your care team tells you to take medicine on the morning of surgery, it's okay to take it with a sip of water.  Preventing infection    Shower or bathe the night before and morning of your surgery. Follow the instructions your clinic gave you. (If no instructions, use regular soap.)    Don't shave or clip hair near your surgery site. We'll remove the hair if needed.    Don't smoke or vape the morning of surgery. You may chew nicotine gum up to 2 hours before surgery. A nicotine patch is okay.  ? Note: Some surgeries require you to completely quit smoking and nicotine. Check with your surgeon.    Your care team will make every effort to keep you safe from infection. We will:  ? Clean our hands often with soap and water (or an alcohol-based hand rub).  ? Clean the skin at your surgery site with a special soap that kills germs.  ? Give you a special gown to keep you warm. (Cold raises the risk of infection.)  ? Wear special hair covers, masks, gowns and gloves during surgery.  ? Give antibiotic medicine, if prescribed. Not all surgeries need antibiotics.  What to bring on the day of surgery    Photo ID and insurance card    Copy of your health care directive, if you have one    Glasses and hearing aides (bring cases)  ? You can't wear contacts during surgery    Inhaler and eye drops, if you use them (tell us about these when you arrive)    CPAP machine or breathing device, if you use them    A few personal items, if spending the night    If you have . . .  ? A pacemaker, ICD (cardiac defibrillator) or other implant: Bring the ID card.  ? An implanted stimulator: Bring the remote control.  ? A legal guardian: Bring a copy of the certified (court-stamped) guardianship papers.  Please remove any jewelry, including body piercings. Leave jewelry and other valuables at home.  If you're going home the day of surgery    You  must have a responsible adult drive you home. They should stay with you overnight as well.    If you don't have someone to stay with you, and you aren't safe to go home alone, we may keep you overnight. Insurance often won't pay for this.  After surgery  If it's hard to control your pain or you need more pain medicine, please call your surgeon's office.  Questions?   If you have any questions for your care team, list them here: _________________________________________________________________________________________________________________________________________________________________________ ____________________________________ ____________________________________ ____________________________________  For informational purposes only. Not to replace the advice of your health care provider. Copyright   2003, 2019 [employer]. All rights reserved. Clinically reviewed by Madyson Prater MD. SMARTworks [id number] - REV 07/21.    How to Take Your Medication Before Surgery  - Take all of your medications before surgery as usual

## 2021-12-31 ENCOUNTER — ANESTHESIA EVENT (OUTPATIENT)
Dept: SURGERY | Facility: CLINIC | Age: 66
End: 2021-12-31
Payer: COMMERCIAL

## 2021-12-31 LAB — SARS-COV-2 RNA RESP QL NAA+PROBE: NEGATIVE

## 2022-01-03 ENCOUNTER — HOSPITAL ENCOUNTER (OUTPATIENT)
Facility: CLINIC | Age: 67
Discharge: HOME | End: 2022-01-03
Attending: ORTHOPAEDIC SURGERY | Admitting: ORTHOPAEDIC SURGERY
Payer: COMMERCIAL

## 2022-01-03 ENCOUNTER — ANESTHESIA (OUTPATIENT)
Dept: SURGERY | Facility: CLINIC | Age: 67
End: 2022-01-03
Payer: COMMERCIAL

## 2022-01-03 VITALS
TEMPERATURE: 97.4 F | OXYGEN SATURATION: 95 % | BODY MASS INDEX: 28.41 KG/M2 | RESPIRATION RATE: 14 BRPM | WEIGHT: 181 LBS | HEIGHT: 67 IN | HEART RATE: 87 BPM | DIASTOLIC BLOOD PRESSURE: 101 MMHG | SYSTOLIC BLOOD PRESSURE: 151 MMHG

## 2022-01-03 DIAGNOSIS — M75.121 NONTRAUMATIC COMPLETE TEAR OF RIGHT ROTATOR CUFF: Primary | ICD-10-CM

## 2022-01-03 PROCEDURE — 370N000017 HC ANESTHESIA TECHNICAL FEE, PER MIN: Performed by: ORTHOPAEDIC SURGERY

## 2022-01-03 PROCEDURE — 250N000011 HC RX IP 250 OP 636: Performed by: NURSE ANESTHETIST, CERTIFIED REGISTERED

## 2022-01-03 PROCEDURE — 250N000025 HC SEVOFLURANE, PER MIN: Performed by: ORTHOPAEDIC SURGERY

## 2022-01-03 PROCEDURE — C1713 ANCHOR/SCREW BN/BN,TIS/BN: HCPCS | Performed by: ORTHOPAEDIC SURGERY

## 2022-01-03 PROCEDURE — 999N000141 HC STATISTIC PRE-PROCEDURE NURSING ASSESSMENT: Performed by: ORTHOPAEDIC SURGERY

## 2022-01-03 PROCEDURE — 710N000010 HC RECOVERY PHASE 1, LEVEL 2, PER MIN: Performed by: ORTHOPAEDIC SURGERY

## 2022-01-03 PROCEDURE — 250N000013 HC RX MED GY IP 250 OP 250 PS 637: Performed by: NURSE ANESTHETIST, CERTIFIED REGISTERED

## 2022-01-03 PROCEDURE — 258N000003 HC RX IP 258 OP 636: Performed by: NURSE ANESTHETIST, CERTIFIED REGISTERED

## 2022-01-03 PROCEDURE — 250N000009 HC RX 250: Performed by: NURSE ANESTHETIST, CERTIFIED REGISTERED

## 2022-01-03 PROCEDURE — 272N000001 HC OR GENERAL SUPPLY STERILE: Performed by: ORTHOPAEDIC SURGERY

## 2022-01-03 PROCEDURE — 710N000012 HC RECOVERY PHASE 2, PER MINUTE: Performed by: ORTHOPAEDIC SURGERY

## 2022-01-03 PROCEDURE — 250N000011 HC RX IP 250 OP 636: Performed by: ORTHOPAEDIC SURGERY

## 2022-01-03 PROCEDURE — 360N000077 HC SURGERY LEVEL 4, PER MIN: Performed by: ORTHOPAEDIC SURGERY

## 2022-01-03 DEVICE — IMPLANTABLE DEVICE: Type: IMPLANTABLE DEVICE | Site: SHOULDER | Status: FUNCTIONAL

## 2022-01-03 RX ORDER — DEXAMETHASONE SODIUM PHOSPHATE 4 MG/ML
INJECTION, SOLUTION INTRA-ARTICULAR; INTRALESIONAL; INTRAMUSCULAR; INTRAVENOUS; SOFT TISSUE PRN
Status: DISCONTINUED | OUTPATIENT
Start: 2022-01-03 | End: 2022-01-03

## 2022-01-03 RX ORDER — OXYCODONE HYDROCHLORIDE 5 MG/1
5-10 TABLET ORAL EVERY 4 HOURS PRN
Qty: 26 TABLET | Refills: 0 | Status: SHIPPED | OUTPATIENT
Start: 2022-01-03 | End: 2022-04-28

## 2022-01-03 RX ORDER — PROPOFOL 10 MG/ML
INJECTION, EMULSION INTRAVENOUS PRN
Status: DISCONTINUED | OUTPATIENT
Start: 2022-01-03 | End: 2022-01-03

## 2022-01-03 RX ORDER — PROPOFOL 10 MG/ML
INJECTION, EMULSION INTRAVENOUS CONTINUOUS PRN
Status: DISCONTINUED | OUTPATIENT
Start: 2022-01-03 | End: 2022-01-03

## 2022-01-03 RX ORDER — PHENYLEPHRINE HYDROCHLORIDE 10 MG/ML
INJECTION INTRAVENOUS PRN
Status: DISCONTINUED | OUTPATIENT
Start: 2022-01-03 | End: 2022-01-03

## 2022-01-03 RX ORDER — ONDANSETRON 2 MG/ML
INJECTION INTRAMUSCULAR; INTRAVENOUS PRN
Status: DISCONTINUED | OUTPATIENT
Start: 2022-01-03 | End: 2022-01-03

## 2022-01-03 RX ORDER — KETAMINE HYDROCHLORIDE 10 MG/ML
INJECTION, SOLUTION INTRAMUSCULAR; INTRAVENOUS PRN
Status: DISCONTINUED | OUTPATIENT
Start: 2022-01-03 | End: 2022-01-03

## 2022-01-03 RX ORDER — LIDOCAINE HYDROCHLORIDE 10 MG/ML
INJECTION, SOLUTION INFILTRATION; PERINEURAL PRN
Status: DISCONTINUED | OUTPATIENT
Start: 2022-01-03 | End: 2022-01-03

## 2022-01-03 RX ORDER — ONDANSETRON 2 MG/ML
4 INJECTION INTRAMUSCULAR; INTRAVENOUS EVERY 30 MIN PRN
Status: DISCONTINUED | OUTPATIENT
Start: 2022-01-03 | End: 2022-01-03 | Stop reason: HOSPADM

## 2022-01-03 RX ORDER — MAGNESIUM SULFATE HEPTAHYDRATE 40 MG/ML
2 INJECTION, SOLUTION INTRAVENOUS ONCE
Status: COMPLETED | OUTPATIENT
Start: 2022-01-03 | End: 2022-01-03

## 2022-01-03 RX ORDER — CEFAZOLIN SODIUM 2 G/100ML
2 INJECTION, SOLUTION INTRAVENOUS
Status: COMPLETED | OUTPATIENT
Start: 2022-01-03 | End: 2022-01-03

## 2022-01-03 RX ORDER — DIMENHYDRINATE 50 MG/ML
25 INJECTION, SOLUTION INTRAMUSCULAR; INTRAVENOUS
Status: DISCONTINUED | OUTPATIENT
Start: 2022-01-03 | End: 2022-01-03 | Stop reason: HOSPADM

## 2022-01-03 RX ORDER — GABAPENTIN 300 MG/1
300 CAPSULE ORAL
Status: COMPLETED | OUTPATIENT
Start: 2022-01-03 | End: 2022-01-03

## 2022-01-03 RX ORDER — HYDROXYZINE HYDROCHLORIDE 25 MG/1
25 TABLET, FILM COATED ORAL EVERY 6 HOURS PRN
Status: DISCONTINUED | OUTPATIENT
Start: 2022-01-03 | End: 2022-01-03 | Stop reason: HOSPADM

## 2022-01-03 RX ORDER — ONDANSETRON 4 MG/1
4 TABLET, ORALLY DISINTEGRATING ORAL EVERY 30 MIN PRN
Status: DISCONTINUED | OUTPATIENT
Start: 2022-01-03 | End: 2022-01-03 | Stop reason: HOSPADM

## 2022-01-03 RX ORDER — GLYCOPYRROLATE 0.2 MG/ML
INJECTION, SOLUTION INTRAMUSCULAR; INTRAVENOUS PRN
Status: DISCONTINUED | OUTPATIENT
Start: 2022-01-03 | End: 2022-01-03

## 2022-01-03 RX ORDER — SODIUM CHLORIDE, SODIUM LACTATE, POTASSIUM CHLORIDE, CALCIUM CHLORIDE 600; 310; 30; 20 MG/100ML; MG/100ML; MG/100ML; MG/100ML
INJECTION, SOLUTION INTRAVENOUS CONTINUOUS
Status: DISCONTINUED | OUTPATIENT
Start: 2022-01-03 | End: 2022-01-03 | Stop reason: HOSPADM

## 2022-01-03 RX ORDER — FENTANYL CITRATE 50 UG/ML
INJECTION, SOLUTION INTRAMUSCULAR; INTRAVENOUS PRN
Status: DISCONTINUED | OUTPATIENT
Start: 2022-01-03 | End: 2022-01-03

## 2022-01-03 RX ORDER — EPHEDRINE SULFATE 50 MG/ML
INJECTION, SOLUTION INTRAVENOUS PRN
Status: DISCONTINUED | OUTPATIENT
Start: 2022-01-03 | End: 2022-01-03

## 2022-01-03 RX ORDER — MEPERIDINE HYDROCHLORIDE 25 MG/ML
12.5 INJECTION INTRAMUSCULAR; INTRAVENOUS; SUBCUTANEOUS
Status: DISCONTINUED | OUTPATIENT
Start: 2022-01-03 | End: 2022-01-03 | Stop reason: HOSPADM

## 2022-01-03 RX ORDER — HYDROXYZINE HYDROCHLORIDE 50 MG/1
50 TABLET, FILM COATED ORAL EVERY 6 HOURS PRN
Status: DISCONTINUED | OUTPATIENT
Start: 2022-01-03 | End: 2022-01-03 | Stop reason: HOSPADM

## 2022-01-03 RX ORDER — LIDOCAINE 40 MG/G
CREAM TOPICAL
Status: DISCONTINUED | OUTPATIENT
Start: 2022-01-03 | End: 2022-01-03 | Stop reason: HOSPADM

## 2022-01-03 RX ORDER — FENTANYL CITRATE 50 UG/ML
25 INJECTION, SOLUTION INTRAMUSCULAR; INTRAVENOUS EVERY 5 MIN PRN
Status: DISCONTINUED | OUTPATIENT
Start: 2022-01-03 | End: 2022-01-03 | Stop reason: HOSPADM

## 2022-01-03 RX ORDER — LIDOCAINE HYDROCHLORIDE AND EPINEPHRINE BITARTRATE 20; .01 MG/ML; MG/ML
INJECTION, SOLUTION SUBCUTANEOUS PRN
Status: DISCONTINUED | OUTPATIENT
Start: 2022-01-03 | End: 2022-01-03

## 2022-01-03 RX ORDER — ACETAMINOPHEN 325 MG/1
975 TABLET ORAL ONCE
Status: COMPLETED | OUTPATIENT
Start: 2022-01-03 | End: 2022-01-03

## 2022-01-03 RX ORDER — FENTANYL CITRATE 50 UG/ML
25 INJECTION, SOLUTION INTRAMUSCULAR; INTRAVENOUS
Status: DISCONTINUED | OUTPATIENT
Start: 2022-01-03 | End: 2022-01-03 | Stop reason: HOSPADM

## 2022-01-03 RX ORDER — HYDROMORPHONE HCL IN WATER/PF 6 MG/30 ML
0.2 PATIENT CONTROLLED ANALGESIA SYRINGE INTRAVENOUS EVERY 5 MIN PRN
Status: DISCONTINUED | OUTPATIENT
Start: 2022-01-03 | End: 2022-01-03 | Stop reason: HOSPADM

## 2022-01-03 RX ORDER — CEFAZOLIN SODIUM 2 G/100ML
2 INJECTION, SOLUTION INTRAVENOUS SEE ADMIN INSTRUCTIONS
Status: DISCONTINUED | OUTPATIENT
Start: 2022-01-03 | End: 2022-01-03 | Stop reason: HOSPADM

## 2022-01-03 RX ORDER — ROPIVACAINE HYDROCHLORIDE 7.5 MG/ML
INJECTION, SOLUTION EPIDURAL; PERINEURAL PRN
Status: DISCONTINUED | OUTPATIENT
Start: 2022-01-03 | End: 2022-01-03

## 2022-01-03 RX ORDER — HYDRALAZINE HYDROCHLORIDE 20 MG/ML
2.5-5 INJECTION INTRAMUSCULAR; INTRAVENOUS EVERY 10 MIN PRN
Status: DISCONTINUED | OUTPATIENT
Start: 2022-01-03 | End: 2022-01-03 | Stop reason: HOSPADM

## 2022-01-03 RX ADMIN — PHENYLEPHRINE HYDROCHLORIDE 100 MCG: 10 INJECTION INTRAVENOUS at 15:58

## 2022-01-03 RX ADMIN — MIDAZOLAM 2 MG: 1 INJECTION INTRAMUSCULAR; INTRAVENOUS at 14:23

## 2022-01-03 RX ADMIN — CEFAZOLIN SODIUM 2 G: 2 INJECTION, SOLUTION INTRAVENOUS at 15:08

## 2022-01-03 RX ADMIN — DEXAMETHASONE SODIUM PHOSPHATE 8 MG: 4 INJECTION, SOLUTION INTRA-ARTICULAR; INTRALESIONAL; INTRAMUSCULAR; INTRAVENOUS; SOFT TISSUE at 15:11

## 2022-01-03 RX ADMIN — PHENYLEPHRINE HYDROCHLORIDE 50 MCG: 10 INJECTION INTRAVENOUS at 15:46

## 2022-01-03 RX ADMIN — ROPIVACAINE HYDROCHLORIDE 30 ML: 7.5 INJECTION, SOLUTION EPIDURAL; PERINEURAL at 14:29

## 2022-01-03 RX ADMIN — KETAMINE HYDROCHLORIDE 50 MG: 10 INJECTION INTRAMUSCULAR; INTRAVENOUS at 15:11

## 2022-01-03 RX ADMIN — SODIUM CHLORIDE, POTASSIUM CHLORIDE, SODIUM LACTATE AND CALCIUM CHLORIDE: 600; 310; 30; 20 INJECTION, SOLUTION INTRAVENOUS at 17:01

## 2022-01-03 RX ADMIN — PROPOFOL 100 MG: 10 INJECTION, EMULSION INTRAVENOUS at 15:22

## 2022-01-03 RX ADMIN — ONDANSETRON 4 MG: 2 INJECTION INTRAMUSCULAR; INTRAVENOUS at 16:49

## 2022-01-03 RX ADMIN — PROPOFOL 50 MCG/KG/MIN: 10 INJECTION, EMULSION INTRAVENOUS at 15:11

## 2022-01-03 RX ADMIN — HYDRALAZINE HYDROCHLORIDE 5 MG: 20 INJECTION INTRAMUSCULAR; INTRAVENOUS at 19:53

## 2022-01-03 RX ADMIN — GLYCOPYRROLATE 0.2 MG: 0.2 INJECTION, SOLUTION INTRAMUSCULAR; INTRAVENOUS at 15:27

## 2022-01-03 RX ADMIN — Medication 5 ML: at 14:27

## 2022-01-03 RX ADMIN — PROPOFOL 100 MG: 10 INJECTION, EMULSION INTRAVENOUS at 15:11

## 2022-01-03 RX ADMIN — PHENYLEPHRINE HYDROCHLORIDE 0.2 MCG/KG/MIN: 10 INJECTION INTRAVENOUS at 16:03

## 2022-01-03 RX ADMIN — PHENYLEPHRINE HYDROCHLORIDE 50 MCG: 10 INJECTION INTRAVENOUS at 15:49

## 2022-01-03 RX ADMIN — SODIUM CHLORIDE, POTASSIUM CHLORIDE, SODIUM LACTATE AND CALCIUM CHLORIDE: 600; 310; 30; 20 INJECTION, SOLUTION INTRAVENOUS at 14:00

## 2022-01-03 RX ADMIN — FENTANYL CITRATE 100 MCG: 50 INJECTION, SOLUTION INTRAMUSCULAR; INTRAVENOUS at 14:23

## 2022-01-03 RX ADMIN — LIDOCAINE HYDROCHLORIDE 50 MG: 10 INJECTION, SOLUTION INFILTRATION; PERINEURAL at 15:11

## 2022-01-03 RX ADMIN — ROCURONIUM BROMIDE 50 MG: 50 INJECTION, SOLUTION INTRAVENOUS at 15:11

## 2022-01-03 RX ADMIN — GABAPENTIN 300 MG: 300 CAPSULE ORAL at 13:50

## 2022-01-03 RX ADMIN — EPHEDRINE SULFATE 5 MG: 50 INJECTION, SOLUTION INTRAVENOUS at 15:52

## 2022-01-03 RX ADMIN — PHENYLEPHRINE HYDROCHLORIDE 100 MCG: 10 INJECTION INTRAVENOUS at 15:52

## 2022-01-03 RX ADMIN — ACETAMINOPHEN 975 MG: 325 TABLET, FILM COATED ORAL at 13:50

## 2022-01-03 RX ADMIN — EPHEDRINE SULFATE 5 MG: 50 INJECTION, SOLUTION INTRAVENOUS at 15:40

## 2022-01-03 RX ADMIN — EPHEDRINE SULFATE 5 MG: 50 INJECTION, SOLUTION INTRAVENOUS at 16:33

## 2022-01-03 RX ADMIN — EPHEDRINE SULFATE 5 MG: 50 INJECTION, SOLUTION INTRAVENOUS at 15:58

## 2022-01-03 RX ADMIN — EPHEDRINE SULFATE 5 MG: 50 INJECTION, SOLUTION INTRAVENOUS at 15:28

## 2022-01-03 RX ADMIN — LIDOCAINE HYDROCHLORIDE 0.1 ML: 10 INJECTION, SOLUTION EPIDURAL; INFILTRATION; INTRACAUDAL; PERINEURAL at 14:01

## 2022-01-03 RX ADMIN — EPHEDRINE SULFATE 5 MG: 50 INJECTION, SOLUTION INTRAVENOUS at 16:49

## 2022-01-03 RX ADMIN — EPHEDRINE SULFATE 5 MG: 50 INJECTION, SOLUTION INTRAVENOUS at 15:43

## 2022-01-03 RX ADMIN — MAGNESIUM SULFATE HEPTAHYDRATE 2 G: 40 INJECTION, SOLUTION INTRAVENOUS at 15:30

## 2022-01-03 ASSESSMENT — MIFFLIN-ST. JEOR: SCORE: 1569.64

## 2022-01-03 NOTE — ANESTHESIA PROCEDURE NOTES
Airway       Patient location during procedure: OR       Procedure Start/Stop Times: 1/3/2022 3:14 PM  Staff -        CRNA: Birdie Butcher APRN CRNA       Performed By: CRNA  Consent for Airway        Urgency: elective  Indications and Patient Condition       Indications for airway management: chaka-procedural       Induction type:intravenous       Mask difficulty assessment: 1 - vent by mask    Final Airway Details       Final airway type: endotracheal airway       Successful airway: ETT - single and Oral  Endotracheal Airway Details        ETT size (mm): 7.5       Cuffed: yes       Cuff volume (mL): 6       Successful intubation technique: video laryngoscopy       VL Blade Size: Esquivel 3       Grade View of Cords: 1       Adjucts: stylet       Position: Left       Measured from: gums/teeth       Secured at (cm): 22       Bite block used: None    Post intubation assessment        Placement verified by: capnometry, equal breath sounds and chest rise        Number of attempts at approach: 1       Number of other approaches attempted: 0       Secured with: plastic tape       Ease of procedure: easy       Dentition: Intact and Unchanged

## 2022-01-03 NOTE — ANESTHESIA CARE TRANSFER NOTE
Patient: Bryce Diaz    Procedure: Procedure(s):  Shoulder arthroscopy mini open rotator cuff repair,       Diagnosis: Rotator cuff tear [M75.100]  Diagnosis Additional Information: No value filed.    Anesthesia Type:   General     Note:    Oropharynx: oropharynx clear of all foreign objects and spontaneously breathing  Level of Consciousness: drowsy  Oxygen Supplementation: nasal cannula  Level of Supplemental Oxygen (L/min / FiO2): 3  Independent Airway: airway patency satisfactory and stable  Dentition: dentition unchanged  Vital Signs Stable: post-procedure vital signs reviewed and stable  Report to RN Given: handoff report given  Patient transferred to: PACU    Handoff Report: Identifed the Patient, Identified the Reponsible Provider, Reviewed the pertinent medical history, Discussed the surgical course, Reviewed Intra-OP anesthesia mangement and issues during anesthesia, Set expectations for post-procedure period and Allowed opportunity for questions and acknowledgement of understanding      Vitals:  Vitals Value Taken Time   /100 01/03/22 1735   Temp     Pulse 87 01/03/22 1736   Resp 16 01/03/22 1736   SpO2 95 % 01/03/22 1736   Vitals shown include unvalidated device data.    Electronically Signed By: DES Schafer CRNA  January 3, 2022  5:36 PM

## 2022-01-03 NOTE — ANESTHESIA PROCEDURE NOTES
Brachial plexus Procedure Note    Pre-Procedure   Staff -        CRNA: Birdie Butcher APRN CRNA       Performed By: CRNA       Location: pre-op       Pre-Anesthestic Checklist: patient identified, IV checked, site marked, risks and benefits discussed, informed consent, monitors and equipment checked, pre-op evaluation, at physician/surgeon's request and post-op pain management  Timeout:       Correct Patient: Yes        Correct Procedure: Yes        Correct Site: Yes        Correct Position: Yes        Correct Laterality: Yes        Site Marked: Yes  Procedure Documentation  Procedure: Brachial plexus       Laterality: right       Patient Position: supine       Patient Prep/Sterile Barriers: sterile gloves, mask, patient draped       Skin prep: Chloraprep (interscalene approach).       Needle Type: insulated and short bevel       Needle Gauge: 21.        Needle Length (Inches): 4        Ultrasound guided       1. Ultrasound was used to identify targeted nerve, plexus, vascular marker, or fascial plane and place a needle adjacent to it in real-time.       2. Ultrasound was used to visualize the spread of anesthetic in close proximity to the above referenced structure.       3. A permanent image is entered into the patient's record.       4. The visualized anatomic structures appeared normal.       5. There were no apparent abnormal pathologic findings.       Nerve Stim: Initial Level 1 mA.  Lowest motor response 0.4 mA.    Assessment/Narrative         The placement was negative for: blood aspirated, painful injection and site bleeding       Paresthesias: No.     Test dose of mL lidocaine 2% w/ 1:200,000 epinephrine at.         Test dose negative, 3 minutes after injection, for signs of intravascular, subdural, or intrathecal injection.      Bolus given via needle. No blood aspirated via catheter.        Secured via.        Insertion/Infusion Method: Single Shot       Complications: none       Injection made  incrementally with aspirations every 5 mL.

## 2022-01-03 NOTE — ANESTHESIA PREPROCEDURE EVALUATION
Anesthesia Pre-Procedure Evaluation    Patient: Bryce Diaz   MRN: [id number] : 1955        Preoperative Diagnosis: Rotator cuff tear [M75.100]    Procedure : Procedure(s):  Shoulder arthroscopy mini open rotator cuff repair, possible biceps tenotomy          Past Medical History:   Diagnosis Date     Backache, unspecified     Hx of back pain     Depressive disorder, not elsewhere classified 1979    committed for suicidal ideation and depression     Depressive disorder, not elsewhere classified 2001    E/P Attention Deficit Disorder - See Sum. [facility]     Heart murmur      Hernia of unspecified site of abdominal cavity without mention of obstruction or gangrene 1998    Umbilical hernia repair with mesh plug     Hypertension       Past Surgical History:   Procedure Laterality Date     COLONOSCOPY N/A 2015    Procedure: COLONOSCOPY;  Surgeon: Juan Francisco Cornejo MD;  Location:  GI     COLONOSCOPY N/A 3/29/2021    Procedure: COLONOSCOPY;  Surgeon: Janki Correa MD;  Location: [facility]     ESOPHAGOSCOPY, GASTROSCOPY, DUODENOSCOPY (EGD), COMBINED N/A 3/29/2021    Procedure: ESOPHAGOGASTRODUODENOSCOPY, WITH BIOPSY;  Surgeon: Janki Correa MD;  Location: [facility]     HC RECONSTRUCT TENDON GOLDEN EA, W LOCAL TISSUES  1998    A-1 pulley release, right fourth finger     HC UGI ENDOSCOPY DIAG W OR W/O BRUSH/WASH      bx. pending  - use proton pump inhibitor indefinitely     HEMILAMINECTOMY, DISCECTOMY LUMBAR TWO LEVELS, COMBINED Right 10/21/2015    Procedure: COMBINED HEMILAMINECTOMY, DISCECTOMY LUMBAR TWO LEVELS;  Surgeon: Jesus Ornelas MD;  Location: [facility]     INJECT EPIDURAL LUMBAR  /,2/10/14    [facility]     INJECT EPIDURAL LUMBAR  /14,14    [facility]     INJECT EPIDURAL LUMBAR  12/15/14    [facility]     ZZC APPENDECTOMY      5 years of age     ZZC NONSPECIFIC PROCEDURE  1983    neck fusion      No Known Allergies    Social History     Tobacco Use     Smoking status: Never Smoker     Smokeless tobacco: Never Used     Tobacco comment: no smoking in the home   Substance Use Topics     Alcohol use: Yes     Comment: rare      Wt Readings from Last 1 Encounters:   01/03/22 82.1 kg (181 lb)        Anesthesia Evaluation   Pt has had prior anesthetic. Type: General, MAC and Regional.    No history of anesthetic complications       ROS/MED HX  ENT/Pulmonary:  - neg pulmonary ROS     Neurologic:  - neg neurologic ROS     Cardiovascular:     (+) hypertension-----valvular problems/murmurs Previous cardiac testing   Echo: Date: Results:    Stress Test: Date: Results:    ECG Reviewed: Date: 12/30/21 Results:  Appears normal, NSR, normal axis, normal intervals, no acute ST/T changes c/w ischemia, unchanged from previous tracings, borderline LVH  Cath: Date: Results:      METS/Exercise Tolerance:     Hematologic:  - neg hematologic  ROS     Musculoskeletal: Comment: Intervertebral lumbar disc disorder with myelopathy, lumbar region  S/p lumbar lami      GI/Hepatic:     (+) GERD, Asymptomatic on medication,     Renal/Genitourinary:     (+) BPH,     Endo:  - neg endo ROS     Psychiatric/Substance Use:     (+) psychiatric history depression and other (comment) (ADHD)     Infectious Disease:  - neg infectious disease ROS     Malignancy:  - neg malignancy ROS     Other:  - neg other ROS          Physical Exam    Airway        Mallampati: I   TM distance: > 3 FB   Neck ROM: full   Mouth opening: > 3 cm    Respiratory Devices and Support         Dental  no notable dental history         Cardiovascular             Pulmonary   pulmonary exam normal                OUTSIDE LABS:  CBC:   Lab Results   Component Value Date    WBC 7.2 03/04/2021    WBC 6.9 03/20/2019    HGB 12.1 (L) 03/04/2021    HGB 13.6 03/20/2019    HCT 36.1 (L) 03/04/2021    HCT 40.6 03/20/2019     03/04/2021     03/20/2019     BMP:   Lab Results   Component Value Date      12/30/2021     03/04/2021    POTASSIUM 4.6 12/30/2021    POTASSIUM 4.2 03/04/2021    CHLORIDE 106 12/30/2021    CHLORIDE 108 03/04/2021    CO2 30 12/30/2021    CO2 27 03/04/2021    BUN 21 12/30/2021    BUN 37 (H) 03/04/2021    CR 0.94 12/30/2021    CR 1.08 03/04/2021    GLC 88 12/30/2021    GLC 89 03/04/2021     COAGS: No results found for: PTT, INR, FIBR  POC: No results found for: BGM, HCG, HCGS  HEPATIC:   Lab Results   Component Value Date    ALBUMIN 3.8 03/04/2021    PROTTOTAL 7.7 03/04/2021    ALT 26 03/04/2021    AST 15 03/04/2021    ALKPHOS 92 03/04/2021    BILITOTAL 0.2 03/04/2021     OTHER:   Lab Results   Component Value Date    A1C 5.2 03/20/2019    SVETA 9.4 12/30/2021    MAG 2.3 04/06/2004    TSH 1.41 03/20/2019    CRP 6.4 07/19/2017    SED 16 (H) 09/30/2005       Anesthesia Plan    ASA Status:  3   NPO Status:  NPO Appropriate    Anesthesia Type: General.     - Airway: ETT   Induction: Intravenous, Propofol.   Maintenance: Balanced.        Consents    Anesthesia Plan(s) and associated risks, benefits, and realistic alternatives discussed. Questions answered and patient/representative(s) expressed understanding.     - Discussed: Risks, Benefits and Alternatives for BOTH SEDATION and the PROCEDURE were discussed     - Discussed with:  Patient      - Extended Intubation/Ventilatory Support Discussed: No.      - Patient is DNR/DNI Status: No    Use of blood products discussed: No .     Postoperative Care    Pain management: IV analgesics, Oral pain medications, Peripheral nerve block (Single Shot), Multi-modal analgesia.   PONV prophylaxis: Ondansetron (or other 5HT-3), Dexamethasone or Solumedrol, Background Propofol Infusion     Comments:                DES Schafer CRNA

## 2022-01-03 NOTE — BRIEF OP NOTE
[facility]  Brief Operative Note      Pre-operative diagnosis: Rotator cuff tear [M75.100]   Post-operative diagnosis: Same   Procedure: Rotator cuff repair (Right)   Surgeon: Khoi Venegas MD     Assistant(s): STEPHEN Lyn   Anesthesia: General endotracheal anesthesia   Estimated blood loss: Minimal               Drains: None   Specimens: None       Findings: See full dictated operative note for details   Complications: None                   Comments: See dictated operative report for full details     Condition: Stable

## 2022-01-04 NOTE — DISCHARGE INSTRUCTIONS
Same Day Surgery Discharge Instructions  Special Precautions After Surgery - Adult    1. It is not unusual to feel lightheaded or faint, up to 24 hours after surgery or while taking pain medication.  If you have these symptoms; sit for a few minutes before standing and have someone assist you when getting up.  2. You should rest and relax for the next 24 hours and must have someone stay with you for at least 24 hours after your discharge.  3. DO NOT DRIVE any vehicle or operate mechanical equipment for 24 hours following the end of your surgery.  DO NOT DRIVE while taking narcotic pain medications that have been prescribed by your physician.  If you had a limb operated on, you must be able to use it fully to drive.  4. DO NOT drink alcoholic beverages for 24 hours following surgery or while taking prescription pain medication.  5. Drink clear liquids (apple juice, ginger ale, broth, 7-Up, etc.).  Progress to your regular diet as you feel able.  6. Any questions call your physician and do not make important decisions for 24 hours.    ___________________________________________________________________  IMPORTANT NUMBERS:    [facility] [place] Main Number:  [phone], [phone]  Pharmacy:  [phone]  Same Day Surgery:  [phone], Monday - Friday until 8:30 p.m.  [facility]:  [phone]

## 2022-01-04 NOTE — ANESTHESIA POSTPROCEDURE EVALUATION
Patient: Bryce Diaz    Procedure: Procedure(s):  Shoulder arthroscopy mini open rotator cuff repair,       Diagnosis:Rotator cuff tear [M75.100]  Diagnosis Additional Information: No value filed.    Anesthesia Type:  General    Note:  Disposition: Outpatient   Postop Pain Control: Uneventful            Sign Out: Well controlled pain   PONV: No   Neuro/Psych: Uneventful            Sign Out: Acceptable/Baseline neuro status   Airway/Respiratory: Uneventful            Sign Out: Acceptable/Baseline resp. status   CV/Hemodynamics: Uneventful            Sign Out: Acceptable CV status; No obvious hypovolemia; No obvious fluid overload   Other NRE: NONE   DID A NON-ROUTINE EVENT OCCUR? No           Last vitals:  Vitals Value Taken Time   /105 01/03/22 1815   Temp 36.4  C (97.6  F) 01/03/22 1800   Pulse 81 01/03/22 1816   Resp 3 01/03/22 1816   SpO2 93 % 01/03/22 1816   Vitals shown include unvalidated device data.    Electronically Signed By: DES Schafer CRNA  January 3, 2022  6:19 PM

## 2022-01-04 NOTE — OP NOTE
Procedure Date: 01/03/2022    PREOPERATIVE DIAGNOSIS:  Right shoulder massive rotator cuff tear.    POSTOPERATIVE DIAGNOSIS:  Right shoulder massive chronic rotator cuff tear.    PROCEDURE PERFORMED:  Right shoulder mini open rotator cuff repair.    SURGEON:  Khoi Venegas MD    ASSISTANTS:  Luly Lugo PA-C.  A first assistant was necessary in this case to assist with patient positioning, maintaining arm positionings during surgery, surgical exposure, incision, irrigation, closure, dressing application, and to assure safe discharge of the patient from the operating room following the procedure.    ANESTHESIA:  General anesthesia with regional anesthesia.    BLOOD REPLACEMENT:  None.    DRAINS:  None.    COMPLICATIONS:  None known.    FINDINGS:  The patient had a large rotator cuff repair involving the entire supraspinatus and the upper portions of the infraspinatus.  There was retraction, but with mobilization, I could perform a marginal convergence repair by bringing the supraspinatus to the very edge of the humeral head cartilage.    DESCRIPTION OF PROCEDURE:  After discussion of the risks, benefits, and alternatives to the procedure with the patient, the patient consented to proceed with the surgery as described below.  He understands the potential for neurovascular injury, infection, wound healing problems, recurrent tear, pain, decreased range of motion, and decreased quality of life.    We brought the patient to the operating room and placed the patient on the operating table in the supine position.  General and regional anesthesia were administered by the anesthesiology staff.  We converted the patient into a left lateral decubitus position on a well-padded beanbag and applied an axillary roll.  We prepped the right shoulder with DuraPrep and draped it in the usual sterile manner.  We applied 15 pounds of longitudinal traction to the right shoulder.    We performed an arthroscopy of the right shoulder by  making a posterior portal and under arthroscopic visualization, established an anterosuperior portal.  We were able to visualize grade 3 chondromalacia of the upper portion of the humeral head and grade 1 to grade 2 mild chondromalacia of the central portion of the glenoid.  The labrum was intact and the biceps tendon appeared relatively unremarkable with only modest biceps tendinopathy and no significant fraying.    The undersurface of the rotator cuff was evaluated and there was a large tear comprising the entire supraspinatus and the upper portion of the infraspinatus tendon.    We inserted the scope and instruments into the subacromial space and established a lateral portal.  We used a grasper to assess the mobilization of the rotator cuff.  Although I was unable to pull the cuff out to its original footprint, there was enough mobility that I felt it worth trying to perform a repair to the edge of the humeral head cartilage.    We extended the lateral portal for a distance of 5 cm and performed a mini open incision.  We dissected the deltoid and removed the subdeltoid bursa.    We placed tag sutures around the rotator cuff, using a Scorpion device.  There again appeared to be enough mobilization that with rongeuring of the footprint and the most lateral margin of the humeral head cartilage, I was able to pull the rotator cuff to cortical bone.    We inserted two spaced 5.5 mm Arthrex corkscrew anchors and used the attached suture tape to pass through the tear.  Three sutures were used between the 2 corkscrew anchors.    After passing the suture tape, we tied the rotator cuff down to the decorticated area at the very lateral edge of the humeral head cartilage.  We were unable to advance the cuff to its native footprint; however, this marginal convergence appeared to provide enough coverage to the head, so as to potentially improve the mechanics of the shoulder once healed.    The repair, however, was relatively  tenuous, not so much because of the quality of the tissue, but instead because of the amount of force necessary to pull the tendon lateral enough.    We thoroughly irrigated the incision and reapproximated the deltoid with #1 Vicryl suture.  We reapproximated the subcutaneous tissue with 2-0 Monocryl suture and ran a 3-0 Monocryl subcuticular suture through each of the incisions and portals.  We applied a light dressing over the right shoulder and applied an UltraSling to the right upper extremity.  The patient was awoken from anesthesia and was taken to the recovery room in good condition.    The patient will remain in his UltraSling until 8 weeks postoperatively, given the tenuous nature of the repair.  We will start active range of motion at 8 weeks postoperatively and start strengthening at 3 months postoperatively.  He will come out over the next 2 weeks to perform pendulum exercises.  When he returns to clinic, we will give him a prescription for therapy to initiate passive range of motion of the shoulder.    Khoi Venegas MD        D: 2022   T: 2022   MT: DANA    Name:     GIDEON DEAN  MRN:      [id number]        Account:        [id number]   :      1955           Procedure Date: 2022     Document: [id number]

## 2022-02-01 ENCOUNTER — OFFICE VISIT (OUTPATIENT)
Dept: AUDIOLOGY | Facility: CLINIC | Age: 67
End: 2022-02-01
Attending: NURSE PRACTITIONER
Payer: COMMERCIAL

## 2022-02-01 DIAGNOSIS — H91.92 HEARING LOSS OF LEFT EAR, UNSPECIFIED HEARING LOSS TYPE: ICD-10-CM

## 2022-02-01 DIAGNOSIS — H90.3 BILATERAL SENSORINEURAL HEARING LOSS: Primary | ICD-10-CM

## 2022-02-01 PROCEDURE — 99207 PR NO CHARGE LOS: CPT | Performed by: AUDIOLOGIST

## 2022-02-01 PROCEDURE — 92550 TYMPANOMETRY & REFLEX THRESH: CPT | Performed by: AUDIOLOGIST

## 2022-02-01 PROCEDURE — 92557 COMPREHENSIVE HEARING TEST: CPT | Performed by: AUDIOLOGIST

## 2022-02-01 NOTE — PROGRESS NOTES
AUDIOLOGY REPORT    SUBJECTIVE:  Bryce Diaz is a 66 year old male who was seen in the Audiology Clinic at the [facility] for audiologic evaluation, referred by Lenore Cornejo C.N.P. .No previous audiograms are available at today's appointment.  The patient reports a history of noise exposure. He states he is having difficulty hearing his significant other and the TV. The patient denies  bilateral tinnitus and bilateral otalgia.      OBJECTIVE:  Abuse Screening:  Do you feel unsafe at home or work/school? No  Do you feel threatened by someone? No  Does anyone try to keep you from having contact with others, or doing things outside of your home? No  Physical signs of abuse present? No     Fall Risk Screen:  1. Have you fallen two or more times in the past year? No  2. Have you fallen and had an injury in the past year? No      Otoscopic exam indicates ears are clear of cerumen bilaterally     Pure Tone Thresholds assessed using conventional audiometry with good  reliability from 250-8000 Hz bilaterally using insert earphones and circumaural headphones     RIGHT:  normal sloping to mild sensorineural hearing loss    LEFT:    normal sloping to mild, moderate and severe sensorineural hearing loss    Tympanogram:    RIGHT: normal eardrum mobility    LEFT:   normal eardrum mobility    Reflexes (reported by stimulus ear):  RIGHT: Ipsilateral is present at normal levels  RIGHT: Contralateral is present at normal levels  LEFT:   Ipsilateral is present at normal levels  LEFT:   Contralateral is present at normal levels      Speech Reception Threshold:    RIGHT: 10 dB HL    LEFT:   10 dB HL  Word Recognition Score:     RIGHT: 96% at 50 dB HL using NU-6 recorded word list.    LEFT:   92% at 65 dB HL using NU-6 recorded word list.      ASSESSMENT:     ICD-10-CM    1. Bilateral sensorineural hearing loss  H90.3    2. Hearing loss of left ear, unspecified hearing loss type  H91.92 AUDIOLOGY ADULT  REFERRAL        Today s results were discussed with the patient in detail.     PLAN:  Patient was counseled regarding hearing loss and impact on communication.  Patient is a good candidate for amplification at this time. He would like to wait on amplification until he is on Medicare. It is recommended that the patient be seen by Dr. Colin for medical evaluation of their ears and hearing evaluation.  Please call this clinic with questions regarding these results or recommendations.        Marlena Graham M.A. -[facility], #[id number]

## 2022-02-03 ENCOUNTER — HOSPITAL ENCOUNTER (OUTPATIENT)
Dept: PHYSICAL THERAPY | Facility: CLINIC | Age: 67
Setting detail: THERAPIES SERIES
End: 2022-02-03
Attending: ORTHOPAEDIC SURGERY
Payer: COMMERCIAL

## 2022-02-03 PROCEDURE — 97110 THERAPEUTIC EXERCISES: CPT | Mod: GP | Performed by: PHYSICAL THERAPIST

## 2022-02-03 PROCEDURE — 97161 PT EVAL LOW COMPLEX 20 MIN: CPT | Mod: GP | Performed by: PHYSICAL THERAPIST

## 2022-02-04 NOTE — PROGRESS NOTES
02/03/22 1400   General Information   Type of Visit Initial OP Ortho PT Evaluation   Start of Care Date 02/03/22   Referring Physician Dr Venegas   Patient/Family Goals Statement regain entire function and fish at opener   Orders Evaluate and Treat   Date of Order 01/19/22   Certification Required? 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  had the tear.  was lifting a top of a pump box and placing it on the shelf.  the shoulder strained and it turned purple.  had a repair    Impairments A. Pain   Functional Limitations perform activities of daily living;perform desired leisure / sports activities   How/Where did it occur While lifting   Onset date of current episode/exacerbation 01/03/22   Chronicity New   Pain rating (0-10 point scale) Best (/10);Worst (/10)   Best (/10) 0   Worst (/10) 10   Pain quality A. Sharp   Frequency of pain/symptoms A. Constant   Pain/symptoms are: Worse in the morning   Pain/symptoms exacerbated by C. Lifting;D. Carrying;E. Rest;G. Certain positions   Progression of symptoms since onset: Improved   Fall Risk Screen   Have you fallen 2 or more times in the past year? No   Have you fallen and had an injury in the past year? No   Is patient a fall risk? No   Abuse Screen (yes response referral indicated)   Feels Unsafe at Home or Work/School no   Feels Threatened by Someone no   Does Anyone Try to Keep You From Having Contact with Others or Doing Things Outside Your Home? no   Shoulder Objective Findings   Side (if bilateral, select both right and left) Right   Posture rounded forward shoulder   Cervical Screen (ROM, quadrant) normal no pain    Thoracic Mobility Screen flexed   Scapulothoracic Rhythm protracted at rest   Palpation tender ant shoulder, post elbow   Right Shoulder Flexion PROM 90   Right Shoulder Abduction PROM 65   Right Shoulder ER PROM 35   Right Shoulder IR PROM at 45 50   Planned Therapy Interventions   Planned Therapy Interventions ROM;strengthening;stretching;neuromuscular re-education;manual therapy;joint mobilization   Clinical Impression   Criteria for Skilled Therapeutic Interventions Met yes, treatment indicated   PT Diagnosis RCT large SP repair 1/3   Influenced by the following impairments pain, atrophy, stiffness   Clinical Presentation Stable/Uncomplicated   Clinical Presentation Rationale RCT, OA   Clinical Decision Making (Complexity) Low complexity   Predicted Duration of Therapy Intervention (days/wks) 1x/wk x 12 weeks   Risk & Benefits of therapy have been explained Yes   Patient, Family & other staff in agreement with plan of care No   Education Assessment   Preferred Learning Style Demonstration   Barriers to Learning No barriers   ORTHO GOALS   PT Ortho Eval Goals 1;2;3   Ortho Goal 1   Goal Identifier 1   Goal Description pt will be able to wash hair with bilat UEs   Target Date 03/04/22   Ortho Goal 2   Goal Identifier 2   Goal Description pt will be able to tuck shirt   Target Date 03/18/22   Ortho Goal 3   Goal Identifier 3   Goal Description pt will be able to RTW with lifting restriction of 10#   Target Date 04/29/22   Total Evaluation Time   PT Eval, Low Complexity Minutes (27895) 30

## 2022-02-16 ENCOUNTER — HOSPITAL ENCOUNTER (OUTPATIENT)
Dept: PHYSICAL THERAPY | Facility: CLINIC | Age: 67
Setting detail: THERAPIES SERIES
End: 2022-02-16
Attending: ORTHOPAEDIC SURGERY
Payer: COMMERCIAL

## 2022-02-16 PROCEDURE — 97110 THERAPEUTIC EXERCISES: CPT | Mod: GP | Performed by: PHYSICAL THERAPIST

## 2022-02-23 ENCOUNTER — HOSPITAL ENCOUNTER (OUTPATIENT)
Dept: PHYSICAL THERAPY | Facility: CLINIC | Age: 67
Setting detail: THERAPIES SERIES
End: 2022-02-23
Attending: ORTHOPAEDIC SURGERY
Payer: COMMERCIAL

## 2022-02-23 PROCEDURE — 97110 THERAPEUTIC EXERCISES: CPT | Mod: GP | Performed by: PHYSICAL THERAPIST

## 2022-02-24 ENCOUNTER — TRANSCRIBE ORDERS (OUTPATIENT)
Dept: OTHER | Age: 67
End: 2022-02-24
Payer: COMMERCIAL

## 2022-03-23 ENCOUNTER — HOSPITAL ENCOUNTER (OUTPATIENT)
Dept: PHYSICAL THERAPY | Facility: CLINIC | Age: 67
Setting detail: THERAPIES SERIES
Discharge: HOME | End: 2022-03-23
Attending: ORTHOPAEDIC SURGERY
Payer: COMMERCIAL

## 2022-03-23 PROCEDURE — 97110 THERAPEUTIC EXERCISES: CPT | Mod: GP | Performed by: PHYSICAL THERAPIST

## 2022-04-28 ENCOUNTER — OFFICE VISIT (OUTPATIENT)
Dept: FAMILY MEDICINE | Facility: CLINIC | Age: 67
End: 2022-04-28
Payer: COMMERCIAL

## 2022-04-28 VITALS
TEMPERATURE: 97.3 F | BODY MASS INDEX: 29.13 KG/M2 | DIASTOLIC BLOOD PRESSURE: 78 MMHG | WEIGHT: 186 LBS | RESPIRATION RATE: 14 BRPM | HEART RATE: 71 BPM | OXYGEN SATURATION: 98 % | SYSTOLIC BLOOD PRESSURE: 139 MMHG

## 2022-04-28 DIAGNOSIS — M25.511 ACUTE PAIN OF RIGHT SHOULDER: ICD-10-CM

## 2022-04-28 DIAGNOSIS — I10 HYPERTENSION GOAL BP (BLOOD PRESSURE) < 140/90: ICD-10-CM

## 2022-04-28 DIAGNOSIS — G47.00 PERSISTENT INSOMNIA: ICD-10-CM

## 2022-04-28 DIAGNOSIS — M19.91 PRIMARY OSTEOARTHRITIS, UNSPECIFIED SITE: ICD-10-CM

## 2022-04-28 DIAGNOSIS — K21.9 GASTROESOPHAGEAL REFLUX DISEASE WITHOUT ESOPHAGITIS: ICD-10-CM

## 2022-04-28 DIAGNOSIS — R35.0 INCREASED FREQUENCY OF URINATION: Primary | ICD-10-CM

## 2022-04-28 DIAGNOSIS — D64.9 ANEMIA, UNSPECIFIED TYPE: ICD-10-CM

## 2022-04-28 DIAGNOSIS — N40.1 BENIGN PROSTATIC HYPERPLASIA WITH NOCTURIA: ICD-10-CM

## 2022-04-28 DIAGNOSIS — R97.20 ELEVATED PROSTATE SPECIFIC ANTIGEN (PSA): ICD-10-CM

## 2022-04-28 DIAGNOSIS — R35.1 BENIGN PROSTATIC HYPERPLASIA WITH NOCTURIA: ICD-10-CM

## 2022-04-28 LAB
ALBUMIN SERPL-MCNC: 3.3 G/DL (ref 3.4–5)
ALP SERPL-CCNC: 86 U/L (ref 40–150)
ALT SERPL W P-5'-P-CCNC: 33 U/L (ref 0–70)
ANION GAP SERPL CALCULATED.3IONS-SCNC: 5 MMOL/L (ref 3–14)
AST SERPL W P-5'-P-CCNC: 24 U/L (ref 0–45)
BASOPHILS # BLD AUTO: 0 10E3/UL (ref 0–0.2)
BASOPHILS NFR BLD AUTO: 1 %
BILIRUB SERPL-MCNC: 0.4 MG/DL (ref 0.2–1.3)
BUN SERPL-MCNC: 27 MG/DL (ref 7–30)
CALCIUM SERPL-MCNC: 9 MG/DL (ref 8.5–10.1)
CHLORIDE BLD-SCNC: 109 MMOL/L (ref 94–109)
CHOLEST SERPL-MCNC: 191 MG/DL
CO2 SERPL-SCNC: 25 MMOL/L (ref 20–32)
CREAT SERPL-MCNC: 1.05 MG/DL (ref 0.66–1.25)
EOSINOPHIL # BLD AUTO: 0.2 10E3/UL (ref 0–0.7)
EOSINOPHIL NFR BLD AUTO: 3 %
ERYTHROCYTE [DISTWIDTH] IN BLOOD BY AUTOMATED COUNT: 13.5 % (ref 10–15)
FASTING STATUS PATIENT QL REPORTED: ABNORMAL
GFR SERPL CREATININE-BSD FRML MDRD: 79 ML/MIN/1.73M2
GLUCOSE BLD-MCNC: 84 MG/DL (ref 70–99)
HCT VFR BLD AUTO: 35.3 % (ref 40–53)
HDLC SERPL-MCNC: 53 MG/DL
HGB BLD-MCNC: 11.9 G/DL (ref 13.3–17.7)
IMM GRANULOCYTES # BLD: 0 10E3/UL
IMM GRANULOCYTES NFR BLD: 0 %
LDLC SERPL CALC-MCNC: 119 MG/DL
LYMPHOCYTES # BLD AUTO: 2 10E3/UL (ref 0.8–5.3)
LYMPHOCYTES NFR BLD AUTO: 24 %
MCH RBC QN AUTO: 30.9 PG (ref 26.5–33)
MCHC RBC AUTO-ENTMCNC: 33.7 G/DL (ref 31.5–36.5)
MCV RBC AUTO: 92 FL (ref 78–100)
MONOCYTES # BLD AUTO: 1 10E3/UL (ref 0–1.3)
MONOCYTES NFR BLD AUTO: 13 %
NEUTROPHILS # BLD AUTO: 5 10E3/UL (ref 1.6–8.3)
NEUTROPHILS NFR BLD AUTO: 60 %
NONHDLC SERPL-MCNC: 138 MG/DL
PLATELET # BLD AUTO: 256 10E3/UL (ref 150–450)
POTASSIUM BLD-SCNC: 4.1 MMOL/L (ref 3.4–5.3)
PROT SERPL-MCNC: 7.7 G/DL (ref 6.8–8.8)
PSA SERPL-MCNC: 7.46 UG/L (ref 0–4)
RBC # BLD AUTO: 3.85 10E6/UL (ref 4.4–5.9)
SODIUM SERPL-SCNC: 139 MMOL/L (ref 133–144)
TRIGL SERPL-MCNC: 97 MG/DL
WBC # BLD AUTO: 8.3 10E3/UL (ref 4–11)

## 2022-04-28 PROCEDURE — 36415 COLL VENOUS BLD VENIPUNCTURE: CPT | Performed by: NURSE PRACTITIONER

## 2022-04-28 PROCEDURE — 99214 OFFICE O/P EST MOD 30 MIN: CPT | Performed by: NURSE PRACTITIONER

## 2022-04-28 PROCEDURE — 80061 LIPID PANEL: CPT | Performed by: NURSE PRACTITIONER

## 2022-04-28 PROCEDURE — 83550 IRON BINDING TEST: CPT | Performed by: NURSE PRACTITIONER

## 2022-04-28 PROCEDURE — 82728 ASSAY OF FERRITIN: CPT | Performed by: NURSE PRACTITIONER

## 2022-04-28 PROCEDURE — 85025 COMPLETE CBC W/AUTO DIFF WBC: CPT | Performed by: NURSE PRACTITIONER

## 2022-04-28 PROCEDURE — G0103 PSA SCREENING: HCPCS | Performed by: NURSE PRACTITIONER

## 2022-04-28 PROCEDURE — 82607 VITAMIN B-12: CPT | Performed by: NURSE PRACTITIONER

## 2022-04-28 PROCEDURE — 80053 COMPREHEN METABOLIC PANEL: CPT | Performed by: NURSE PRACTITIONER

## 2022-04-28 RX ORDER — TRAZODONE HYDROCHLORIDE 50 MG/1
50-100 TABLET, FILM COATED ORAL AT BEDTIME
Qty: 180 TABLET | Refills: 1 | Status: SHIPPED | OUTPATIENT
Start: 2022-04-28 | End: 2023-05-03

## 2022-04-28 RX ORDER — LOSARTAN POTASSIUM 100 MG/1
100 TABLET ORAL DAILY
Qty: 90 TABLET | Refills: 1 | Status: SHIPPED | OUTPATIENT
Start: 2022-04-28 | End: 2022-11-22

## 2022-04-28 RX ORDER — METHOCARBAMOL 500 MG/1
TABLET, FILM COATED ORAL
Qty: 60 TABLET | Refills: 3 | Status: SHIPPED | OUTPATIENT
Start: 2022-04-28 | End: 2023-08-02

## 2022-04-28 RX ORDER — TAMSULOSIN HYDROCHLORIDE 0.4 MG/1
0.8 CAPSULE ORAL DAILY
Qty: 180 CAPSULE | Refills: 1 | Status: SHIPPED | OUTPATIENT
Start: 2022-04-28 | End: 2022-10-24

## 2022-04-28 RX ORDER — OMEPRAZOLE 40 MG/1
CAPSULE, DELAYED RELEASE ORAL
Qty: 60 CAPSULE | Refills: 5 | Status: SHIPPED | OUTPATIENT
Start: 2022-04-28 | End: 2023-05-03

## 2022-04-28 RX ORDER — CARVEDILOL 6.25 MG/1
6.25 TABLET ORAL 2 TIMES DAILY
Qty: 180 TABLET | Refills: 1 | Status: SHIPPED | OUTPATIENT
Start: 2022-04-28 | End: 2023-01-25

## 2022-04-28 ASSESSMENT — ANXIETY QUESTIONNAIRES
2. NOT BEING ABLE TO STOP OR CONTROL WORRYING: NOT AT ALL
6. BECOMING EASILY ANNOYED OR IRRITABLE: NOT AT ALL
GAD7 TOTAL SCORE: 0
GAD7 TOTAL SCORE: 0
4. TROUBLE RELAXING: NOT AT ALL
1. FEELING NERVOUS, ANXIOUS, OR ON EDGE: NOT AT ALL
3. WORRYING TOO MUCH ABOUT DIFFERENT THINGS: NOT AT ALL
GAD7 TOTAL SCORE: 0
7. FEELING AFRAID AS IF SOMETHING AWFUL MIGHT HAPPEN: NOT AT ALL
7. FEELING AFRAID AS IF SOMETHING AWFUL MIGHT HAPPEN: NOT AT ALL
5. BEING SO RESTLESS THAT IT IS HARD TO SIT STILL: NOT AT ALL

## 2022-04-28 ASSESSMENT — PATIENT HEALTH QUESTIONNAIRE - PHQ9
SUM OF ALL RESPONSES TO PHQ QUESTIONS 1-9: 12
10. IF YOU CHECKED OFF ANY PROBLEMS, HOW DIFFICULT HAVE THESE PROBLEMS MADE IT FOR YOU TO DO YOUR WORK, TAKE CARE OF THINGS AT HOME, OR GET ALONG WITH OTHER PEOPLE: SOMEWHAT DIFFICULT
SUM OF ALL RESPONSES TO PHQ QUESTIONS 1-9: 12

## 2022-04-28 ASSESSMENT — PAIN SCALES - GENERAL: PAINLEVEL: SEVERE PAIN (6)

## 2022-04-28 NOTE — PROGRESS NOTES
Assessment & Plan     Primary osteoarthritis, unspecified site  Symptomatic care strategies reviewed  Tylenol Extra Strength up to 3500 mg daily  Voltaren gel as needed  Avoid NSAIDs due to hypertension/gastritis    Elevated prostate specific antigen (PSA)  Repeat PSA today  - PSA, screen      Increased frequency of urination  Increase Flomax to 0.8 mg daily    Acute pain of right shoulder  Continue home physical therapy.  Follow-up with surgeon as previously directed  Methocarbamol  - methocarbamol (ROBAXIN) 500 MG tablet  Dispense: 60 tablet; Refill: 3    Hypertension goal BP (blood pressure) < 140/90  Mildly elevated today.  Most likely due to pain.  Continue current meds.  Labs today to monitor  Goal less than 140/90 reviewed  Weight loss and low-sodium diet recommended  - losartan (COZAAR) 100 MG tablet  Dispense: 90 tablet; Refill: 1  - carvedilol (COREG) 6.25 MG tablet  Dispense: 180 tablet; Refill: 1  - CBC with platelets and differential  - Comprehensive metabolic panel (BMP + Alb, Alk Phos, ALT, AST, Total. Bili, TP)  - Lipid panel reflex to direct LDL Non-fasting      Gastroesophageal reflux disease without esophagitis  Chronic and ongoing.  Continue  - omeprazole (PRILOSEC) 40 MG DR capsule  Dispense: 60 capsule; Refill: 5  EGD with long-term use recommended    Persistent insomnia  Continue  - traZODone (DESYREL) 50 MG tablet  Dispense: 180 tablet; Refill: 1    Benign prostatic hyperplasia with nocturia  Increased dose.  - tamsulosin (FLOMAX) 0.4 MG capsule  Dispense: 180 capsule; Refill: 1  - PSA, screen      Call or return to the clinic with any worsening of symptoms or no resolution. Patient/Parent verbalized understanding and is in agreement. Medication side effects reviewed.   Current Outpatient Medications   Medication Sig Dispense Refill     carvedilol (COREG) 6.25 MG tablet Take 1 tablet (6.25 mg) by mouth 2 times daily 180 tablet 1     diclofenac (VOLTAREN) 1 % topical gel Apply 4 g topically  4 times daily 100 g 5     losartan (COZAAR) 100 MG tablet Take 1 tablet (100 mg) by mouth daily 90 tablet 1     methocarbamol (ROBAXIN) 500 MG tablet TAKE 1 TABLET BY MOUTH 3 TIMES DAILY AS NEEDED FOR MUSCLE SPASMS 60 tablet 3     omeprazole (PRILOSEC) 40 MG DR capsule TAKE 1 CAPSULE BY MOUTH 2 TIMES DAILY 60 capsule 5     tamsulosin (FLOMAX) 0.4 MG capsule Take 2 capsules (0.8 mg) by mouth daily 180 capsule 1     traZODone (DESYREL) 50 MG tablet Take 1-2 tablets ( mg) by mouth At Bedtime 180 tablet 1     Chart documentation with Dragon Voice recognition Software. Although reviewed after completion, some words and grammatical errors may remain.    DES Mejia CNP  [facility]    Denise Ugarte is a 66 year old who presents for the following health issues     History of Present Illness       Mental Health Follow-up:                    Today's PHQ-9         PHQ-9 Total Score: 12  PHQ-9 Q9 Thoughts of better off dead/self-harm past 2 weeks :   Not at all    How difficult have these problems made it for you to do your work, take care of things at home, or get along with other people: Somewhat difficult    Today's YAN-7 Score: 0    Hypertension: He presents for follow up of hypertension.  He does check blood pressure  regularly outside of the clinic. Outpatient blood pressures have not been over 140/90. He follows a low salt diet.     Reason for visit:  Pain  Symptom onset:  More than a month  Symptom intensity:  Severe  Symptom progression:  Worsening  Had these symptoms before:  Yes  Has tried/received treatment for these symptoms:  Yes  Previous treatment was successful:  Yes  Prior treatment description:  Meds  What makes it worse:  Working  What makes it better:  No    He eats 0-1 servings of fruits and vegetables daily.He consumes 1 sweetened beverage(s) daily.He exercises with enough effort to increase his heart rate 9 or less minutes per day.  He exercises with  enough effort to increase his heart rate 5 days per week.   He is taking medications regularly.     DERM   Moles the back   Changing     HAND PAIN   Bilateral hand pain   2-3 months now  Worse on the left thumb      Leg cramping   For a few years but getting worse       Right ankle pain   No injury   Getting worse     Ongoing right shoulder limited range of motion.   Surgery this winter  Doing home Physical Therapy and home Physical Therapy program    Wt Readings from Last 5 Encounters:   04/28/22 84.4 kg (186 lb)   01/03/22 82.1 kg (181 lb)   12/30/21 82.1 kg (181 lb)   10/12/21 81.2 kg (179 lb)   09/15/21 83.5 kg (184 lb)     Increase depression symptoms with winter weather and recent surgery limiting his range of motion  No suicidal or homicidal ideation  Support systems in place        Review of Systems   Constitutional, HEENT, cardiovascular, pulmonary, GI, , musculoskeletal, neuro, skin, endocrine and psych systems are negative, except as otherwise noted.      Objective    BP (!) 142/78   Pulse 71   Temp 97.3  F (36.3  C) (Tympanic)   Resp 14   Wt 84.4 kg (186 lb)   SpO2 98%   BMI 29.13 kg/m    Body mass index is 29.13 kg/m .  Physical Exam   GENERAL: healthy, alert and no distress  EYES: Eyes grossly normal to inspection, PERRL and conjunctivae and sclerae normal  HENT: ear canals and TM's normal, nose and mouth without ulcers or lesions  NECK: no adenopathy, no asymmetry, masses, or scars and thyroid normal to palpation  RESP: lungs clear to auscultation - no rales, rhonchi or wheezes  CV: regular rate and rhythm, normal S1 S2, no S3 or S4, no murmur, click or rub, no peripheral edema and peripheral pulses strong  ABDOMEN: soft, nontender, no hepatosplenomegaly, no masses and bowel sounds normal  MS: decreased range of motion right shoulder.  Left thumb.  Right knee  SKIN: Seborrheic keratosis back x4 flesh-colored  NEURO: Normal strength and tone, mentation intact and speech normal  PSYCH: mentation  appears normal, affect normal/bright    Office Visit on 12/30/2021   Component Date Value Ref Range Status     Sodium 12/30/2021 140  133 - 144 mmol/L Final     Potassium 12/30/2021 4.6  3.4 - 5.3 mmol/L Final     Chloride 12/30/2021 106  94 - 109 mmol/L Final     Carbon Dioxide (CO2) 12/30/2021 30  20 - 32 mmol/L Final     Anion Gap 12/30/2021 4  3 - 14 mmol/L Final     Urea Nitrogen 12/30/2021 21  7 - 30 mg/dL Final     Creatinine 12/30/2021 0.94  0.66 - 1.25 mg/dL Final     Calcium 12/30/2021 9.4  8.5 - 10.1 mg/dL Final     Glucose 12/30/2021 88  70 - 99 mg/dL Final     GFR Estimate 12/30/2021 >90  >60 mL/min/1.73m2 Final    Effective December 21, 2021 eGFRcr in adults is calculated using the 2021 CKD-EPI creatinine equation which includes age and gender (Rohini et al., NEJM, DOI: 10.1056/JSZQdp4965188)               Answers for HPI/ROS submitted by the patient on 4/28/2022  If you checked off any problems, how difficult have these problems made it for you to do your work, take care of things at home, or get along with other people?: Somewhat difficult  PHQ9 TOTAL SCORE: 12  YAN 7 TOTAL SCORE: 0

## 2022-04-28 NOTE — LETTER
May 2, 2022      Torito Diaz  [address] [facility] [address]        Dear ,    We are writing to inform you of your test results.    Low normal ferritin level  Normal B12 level  Iron stores are low most likely contributing to your anemia and fatigue  Recommend over-the-counter iron supplementation Vitron-C 1 tablet twice daily  Recheck CBC and iron labs in 6 weeks to monitor response  Recommend you take the iron 2 hours before or after your omeprazole for better absorption  Call with any questions or concerns      If you have any questions or concerns, please call the clinic at the number listed above.       Sincerely,      DES Mejia CNP/ ss    Resulted Orders   PSA, screen   Result Value Ref Range    Prostate Specific Antigen Screen 7.46 (H) 0.00 - 4.00 ug/L    Narrative    Assay Method:  Chemiluminescence using Siemens   Vista analyzer.   Comprehensive metabolic panel (BMP + Alb, Alk Phos, ALT, AST, Total. Bili, TP)   Result Value Ref Range    Sodium 139 133 - 144 mmol/L    Potassium 4.1 3.4 - 5.3 mmol/L    Chloride 109 94 - 109 mmol/L    Carbon Dioxide (CO2) 25 20 - 32 mmol/L      Comment:      This result was previously suppressed from the chart.    Anion Gap 5 3 - 14 mmol/L      Comment:      This result was previously suppressed from the chart.    Urea Nitrogen 27 7 - 30 mg/dL      Comment:      This result was previously suppressed from the chart.    Creatinine 1.05 0.66 - 1.25 mg/dL      Comment:      This result was previously suppressed from the chart.    Calcium 9.0 8.5 - 10.1 mg/dL      Comment:      This result was previously suppressed from the chart.    Glucose 84 70 - 99 mg/dL      Comment:      This result was previously suppressed from the chart.    Alkaline Phosphatase 86 40 - 150 U/L      Comment:      This result was previously suppressed from the chart.    AST 24 0 - 45 U/L      Comment:      This result was previously suppressed from the chart.    ALT 33 0 -  70 U/L      Comment:      This result was previously suppressed from the chart.    Protein Total 7.7 6.8 - 8.8 g/dL      Comment:      This result was previously suppressed from the chart.    Albumin 3.3 (L) 3.4 - 5.0 g/dL      Comment:      This result was previously suppressed from the chart.    Bilirubin Total 0.4 0.2 - 1.3 mg/dL      Comment:      This result was previously suppressed from the chart.    GFR Estimate 79 >60 mL/min/1.73m2      Comment:      Effective December 21, 2021 eGFRcr in adults is calculated using the 2021 CKD-EPI creatinine equation which includes age and gender (Rohini carney al., NEJM, DOI: 10.1056/IMDKbf3387559)   This result was previously suppressed from the chart.   Lipid panel reflex to direct LDL Non-fasting   Result Value Ref Range    Cholesterol 191 <200 mg/dL    Triglycerides 97 <150 mg/dL    Direct Measure HDL 53 >=40 mg/dL    LDL Cholesterol Calculated 119 (H) <=100 mg/dL    Non HDL Cholesterol 138 (H) <130 mg/dL    Patient Fasting > 8hrs? Unknown     Narrative    Cholesterol  Desirable:  <200 mg/dL    Triglycerides  Normal:  Less than 150 mg/dL  Borderline High:  150-199 mg/dL  High:  200-499 mg/dL  Very High:  Greater than or equal to 500 mg/dL    Direct Measure HDL  Female:  Greater than or equal to 50 mg/dL   Male:  Greater than or equal to 40 mg/dL    LDL Cholesterol  Desirable:  <100mg/dL  Above Desirable:  100-129 mg/dL   Borderline High:  130-159 mg/dL   High:  160-189 mg/dL   Very High:  >= 190 mg/dL    Non HDL Cholesterol  Desirable:  130 mg/dL  Above Desirable:  130-159 mg/dL  Borderline High:  160-189 mg/dL  High:  190-219 mg/dL  Very High:  Greater than or equal to 220 mg/dL   CBC with platelets and differential   Result Value Ref Range    WBC Count 8.3 4.0 - 11.0 10e3/uL    RBC Count 3.85 (L) 4.40 - 5.90 10e6/uL    Hemoglobin 11.9 (L) 13.3 - 17.7 g/dL    Hematocrit 35.3 (L) 40.0 - 53.0 %    MCV 92 78 - 100 fL    MCH 30.9 26.5 - 33.0 pg    MCHC 33.7 31.5 - 36.5 g/dL     RDW 13.5 10.0 - 15.0 %    Platelet Count 256 150 - 450 10e3/uL    % Neutrophils 60 %    % Lymphocytes 24 %    % Monocytes 13 %    % Eosinophils 3 %    % Basophils 1 %    % Immature Granulocytes 0 %    Absolute Neutrophils 5.0 1.6 - 8.3 10e3/uL    Absolute Lymphocytes 2.0 0.8 - 5.3 10e3/uL    Absolute Monocytes 1.0 0.0 - 1.3 10e3/uL    Absolute Eosinophils 0.2 0.0 - 0.7 10e3/uL    Absolute Basophils 0.0 0.0 - 0.2 10e3/uL    Absolute Immature Granulocytes 0.0 <=0.4 10e3/uL   Ferritin   Result Value Ref Range    Ferritin 117 26 - 388 ng/mL   Iron and iron binding capacity   Result Value Ref Range    Iron 36 35 - 180 ug/dL    Iron Binding Capacity 442 (H) 240 - 430 ug/dL    Iron Sat Index 8 (L) 15 - 46 %   Vitamin B12   Result Value Ref Range    Vitamin B12 476 193 - 986 pg/mL

## 2022-04-28 NOTE — PATIENT INSTRUCTIONS
Voltaren gel every 4 hours  Tylenol ES up to 3500 mg daily    Cut out sugar, cut out carbs, no white sugar, rice, pasta, potatoes, bread. Avoid high fructose corn syrup.   Increase protein   Increase water 64 ounces a day

## 2022-04-29 DIAGNOSIS — R35.1 BENIGN PROSTATIC HYPERPLASIA WITH NOCTURIA: Primary | ICD-10-CM

## 2022-04-29 DIAGNOSIS — N40.0 BPH WITH ELEVATED PSA: ICD-10-CM

## 2022-04-29 DIAGNOSIS — R97.20 BPH WITH ELEVATED PSA: ICD-10-CM

## 2022-04-29 DIAGNOSIS — D64.9 ANEMIA, UNSPECIFIED TYPE: ICD-10-CM

## 2022-04-29 DIAGNOSIS — N40.1 BENIGN PROSTATIC HYPERPLASIA WITH NOCTURIA: Primary | ICD-10-CM

## 2022-04-29 LAB
FERRITIN SERPL-MCNC: 117 NG/ML (ref 26–388)
IRON SATN MFR SERPL: 8 % (ref 15–46)
IRON SERPL-MCNC: 36 UG/DL (ref 35–180)
TIBC SERPL-MCNC: 442 UG/DL (ref 240–430)
VIT B12 SERPL-MCNC: 476 PG/ML (ref 193–986)

## 2022-04-29 ASSESSMENT — ANXIETY QUESTIONNAIRES: GAD7 TOTAL SCORE: 0

## 2022-07-25 ENCOUNTER — TELEPHONE (OUTPATIENT)
Dept: FAMILY MEDICINE | Facility: CLINIC | Age: 67
End: 2022-07-25

## 2022-07-25 DIAGNOSIS — Z98.890 S/P ROTATOR CUFF REPAIR: Primary | ICD-10-CM

## 2022-07-25 NOTE — TELEPHONE ENCOUNTER
[facility] called [phone] looking for signed order they faxed over today 7/25-- regarding rotator cuff repair on shoulder.  Requesting this order be sign and faxed back by Wednesday of this week. Patient has an appt.     Fax # on form.     Sandhya DEE  Station

## 2022-07-26 ENCOUNTER — MEDICAL CORRESPONDENCE (OUTPATIENT)
Dept: FAMILY MEDICINE | Facility: CLINIC | Age: 67
End: 2022-07-26

## 2022-07-26 NOTE — TELEPHONE ENCOUNTER
Referral placed by Dr. Alves and order signed by Dr. Sinha.  Faxed back to [phone]  [facility]

## 2022-08-03 ENCOUNTER — TRANSFERRED RECORDS (OUTPATIENT)
Dept: FAMILY MEDICINE | Facility: CLINIC | Age: 67
End: 2022-08-03

## 2022-08-31 ENCOUNTER — TRANSFERRED RECORDS (OUTPATIENT)
Dept: FAMILY MEDICINE | Facility: CLINIC | Age: 67
End: 2022-08-31

## 2022-10-18 ENCOUNTER — TELEPHONE (OUTPATIENT)
Dept: FAMILY MEDICINE | Facility: CLINIC | Age: 67
End: 2022-10-18

## 2022-10-19 ENCOUNTER — TRANSFERRED RECORDS (OUTPATIENT)
Dept: FAMILY MEDICINE | Facility: CLINIC | Age: 67
End: 2022-10-19

## 2022-10-22 DIAGNOSIS — N40.1 BENIGN PROSTATIC HYPERPLASIA WITH NOCTURIA: ICD-10-CM

## 2022-10-22 DIAGNOSIS — R35.1 BENIGN PROSTATIC HYPERPLASIA WITH NOCTURIA: ICD-10-CM

## 2022-10-24 RX ORDER — TAMSULOSIN HYDROCHLORIDE 0.4 MG/1
CAPSULE ORAL
Qty: 180 CAPSULE | Refills: 1 | Status: SHIPPED | OUTPATIENT
Start: 2022-10-24 | End: 2023-05-01

## 2022-11-22 DIAGNOSIS — I10 HYPERTENSION GOAL BP (BLOOD PRESSURE) < 140/90: ICD-10-CM

## 2022-11-22 RX ORDER — LOSARTAN POTASSIUM 100 MG/1
TABLET ORAL
Qty: 90 TABLET | Refills: 1 | Status: SHIPPED | OUTPATIENT
Start: 2022-11-22 | End: 2023-05-03

## 2023-01-25 DIAGNOSIS — I10 HYPERTENSION GOAL BP (BLOOD PRESSURE) < 140/90: ICD-10-CM

## 2023-01-25 RX ORDER — CARVEDILOL 6.25 MG/1
TABLET ORAL
Qty: 180 TABLET | Refills: 0 | Status: SHIPPED | OUTPATIENT
Start: 2023-01-25 | End: 2023-05-03

## 2023-04-28 DIAGNOSIS — N40.1 BENIGN PROSTATIC HYPERPLASIA WITH NOCTURIA: ICD-10-CM

## 2023-04-28 DIAGNOSIS — R35.1 BENIGN PROSTATIC HYPERPLASIA WITH NOCTURIA: ICD-10-CM

## 2023-05-01 DIAGNOSIS — I10 HYPERTENSION GOAL BP (BLOOD PRESSURE) < 140/90: ICD-10-CM

## 2023-05-01 DIAGNOSIS — K21.9 GASTROESOPHAGEAL REFLUX DISEASE WITHOUT ESOPHAGITIS: ICD-10-CM

## 2023-05-01 DIAGNOSIS — G47.00 PERSISTENT INSOMNIA: ICD-10-CM

## 2023-05-01 RX ORDER — TAMSULOSIN HYDROCHLORIDE 0.4 MG/1
CAPSULE ORAL
Qty: 180 CAPSULE | Refills: 0 | Status: SHIPPED | OUTPATIENT
Start: 2023-05-01 | End: 2023-07-26

## 2023-05-03 RX ORDER — CARVEDILOL 6.25 MG/1
TABLET ORAL
Qty: 180 TABLET | Refills: 0 | Status: SHIPPED | OUTPATIENT
Start: 2023-05-03 | End: 2023-08-02

## 2023-05-03 RX ORDER — LOSARTAN POTASSIUM 100 MG/1
TABLET ORAL
Qty: 90 TABLET | Refills: 0 | Status: SHIPPED | OUTPATIENT
Start: 2023-05-03 | End: 2023-08-02

## 2023-05-03 RX ORDER — OMEPRAZOLE 40 MG/1
CAPSULE, DELAYED RELEASE ORAL
Qty: 60 CAPSULE | Refills: 0 | Status: SHIPPED | OUTPATIENT
Start: 2023-05-03 | End: 2023-07-03

## 2023-05-03 RX ORDER — TRAZODONE HYDROCHLORIDE 50 MG/1
TABLET, FILM COATED ORAL
Qty: 180 TABLET | Refills: 0 | Status: SHIPPED | OUTPATIENT
Start: 2023-05-03 | End: 2023-08-02

## 2023-07-01 DIAGNOSIS — K21.9 GASTROESOPHAGEAL REFLUX DISEASE WITHOUT ESOPHAGITIS: ICD-10-CM

## 2023-07-03 RX ORDER — OMEPRAZOLE 40 MG/1
40 CAPSULE, DELAYED RELEASE ORAL 2 TIMES DAILY
Qty: 60 CAPSULE | Refills: 0 | Status: SHIPPED | OUTPATIENT
Start: 2023-07-03 | End: 2023-08-02

## 2023-07-03 RX ORDER — OMEPRAZOLE 40 MG/1
CAPSULE, DELAYED RELEASE ORAL
Qty: 60 CAPSULE | Refills: 0 | OUTPATIENT
Start: 2023-07-03

## 2023-07-24 DIAGNOSIS — R35.1 BENIGN PROSTATIC HYPERPLASIA WITH NOCTURIA: ICD-10-CM

## 2023-07-24 DIAGNOSIS — N40.1 BENIGN PROSTATIC HYPERPLASIA WITH NOCTURIA: ICD-10-CM

## 2023-07-26 RX ORDER — TAMSULOSIN HYDROCHLORIDE 0.4 MG/1
CAPSULE ORAL
Qty: 180 CAPSULE | Refills: 0 | Status: SHIPPED | OUTPATIENT
Start: 2023-07-26 | End: 2023-08-02

## 2023-07-26 NOTE — TELEPHONE ENCOUNTER
Routing to provider for consideration of bridge fill to upcoming appt.     Nicole Miller MSN, RN

## 2023-08-02 ENCOUNTER — ANCILLARY PROCEDURE (OUTPATIENT)
Dept: GENERAL RADIOLOGY | Facility: CLINIC | Age: 68
End: 2023-08-02
Attending: NURSE PRACTITIONER
Payer: COMMERCIAL

## 2023-08-02 ENCOUNTER — OFFICE VISIT (OUTPATIENT)
Dept: FAMILY MEDICINE | Facility: CLINIC | Age: 68
End: 2023-08-02
Payer: COMMERCIAL

## 2023-08-02 VITALS
WEIGHT: 179 LBS | TEMPERATURE: 97.3 F | DIASTOLIC BLOOD PRESSURE: 70 MMHG | SYSTOLIC BLOOD PRESSURE: 120 MMHG | RESPIRATION RATE: 14 BRPM | BODY MASS INDEX: 28.09 KG/M2 | HEART RATE: 59 BPM | OXYGEN SATURATION: 100 % | HEIGHT: 67 IN

## 2023-08-02 DIAGNOSIS — I10 HYPERTENSION GOAL BP (BLOOD PRESSURE) < 140/90: ICD-10-CM

## 2023-08-02 DIAGNOSIS — J30.2 SEASONAL ALLERGIC RHINITIS, UNSPECIFIED TRIGGER: ICD-10-CM

## 2023-08-02 DIAGNOSIS — Z11.59 NEED FOR HEPATITIS C SCREENING TEST: ICD-10-CM

## 2023-08-02 DIAGNOSIS — G47.00 PERSISTENT INSOMNIA: ICD-10-CM

## 2023-08-02 DIAGNOSIS — N40.1 BENIGN PROSTATIC HYPERPLASIA WITH NOCTURIA: ICD-10-CM

## 2023-08-02 DIAGNOSIS — R35.1 BENIGN PROSTATIC HYPERPLASIA WITH NOCTURIA: ICD-10-CM

## 2023-08-02 DIAGNOSIS — M25.551 BILATERAL HIP PAIN: ICD-10-CM

## 2023-08-02 DIAGNOSIS — R07.9 CHEST PAIN, UNSPECIFIED TYPE: ICD-10-CM

## 2023-08-02 DIAGNOSIS — R97.20 ELEVATED PROSTATE SPECIFIC ANTIGEN (PSA): ICD-10-CM

## 2023-08-02 DIAGNOSIS — D50.0 IRON DEFICIENCY ANEMIA DUE TO CHRONIC BLOOD LOSS: ICD-10-CM

## 2023-08-02 DIAGNOSIS — Z11.4 SCREENING FOR HIV (HUMAN IMMUNODEFICIENCY VIRUS): ICD-10-CM

## 2023-08-02 DIAGNOSIS — M25.471 RIGHT ANKLE SWELLING: ICD-10-CM

## 2023-08-02 DIAGNOSIS — J01.90 ACUTE SINUSITIS WITH SYMPTOMS > 10 DAYS: ICD-10-CM

## 2023-08-02 DIAGNOSIS — M25.511 ACUTE PAIN OF RIGHT SHOULDER: ICD-10-CM

## 2023-08-02 DIAGNOSIS — M25.552 BILATERAL HIP PAIN: ICD-10-CM

## 2023-08-02 DIAGNOSIS — K21.9 GASTROESOPHAGEAL REFLUX DISEASE WITHOUT ESOPHAGITIS: ICD-10-CM

## 2023-08-02 DIAGNOSIS — Z00.00 MEDICARE ANNUAL WELLNESS VISIT, SUBSEQUENT: Primary | ICD-10-CM

## 2023-08-02 DIAGNOSIS — R25.2 LEG CRAMPING: ICD-10-CM

## 2023-08-02 LAB
BASOPHILS # BLD AUTO: 0.1 10E3/UL (ref 0–0.2)
BASOPHILS NFR BLD AUTO: 1 %
EOSINOPHIL # BLD AUTO: 0.2 10E3/UL (ref 0–0.7)
EOSINOPHIL NFR BLD AUTO: 4 %
ERYTHROCYTE [DISTWIDTH] IN BLOOD BY AUTOMATED COUNT: 13 % (ref 10–15)
HCT VFR BLD AUTO: 34.6 % (ref 40–53)
HGB BLD-MCNC: 11.7 G/DL (ref 13.3–17.7)
IMM GRANULOCYTES # BLD: 0.1 10E3/UL
IMM GRANULOCYTES NFR BLD: 1 %
LYMPHOCYTES # BLD AUTO: 2.2 10E3/UL (ref 0.8–5.3)
LYMPHOCYTES NFR BLD AUTO: 33 %
MCH RBC QN AUTO: 31 PG (ref 26.5–33)
MCHC RBC AUTO-ENTMCNC: 33.8 G/DL (ref 31.5–36.5)
MCV RBC AUTO: 92 FL (ref 78–100)
MONOCYTES # BLD AUTO: 0.5 10E3/UL (ref 0–1.3)
MONOCYTES NFR BLD AUTO: 8 %
NEUTROPHILS # BLD AUTO: 3.4 10E3/UL (ref 1.6–8.3)
NEUTROPHILS NFR BLD AUTO: 53 %
PLATELET # BLD AUTO: 222 10E3/UL (ref 150–450)
RBC # BLD AUTO: 3.77 10E6/UL (ref 4.4–5.9)
URATE SERPL-MCNC: 5.9 MG/DL (ref 3.4–7)
WBC # BLD AUTO: 6.5 10E3/UL (ref 4–11)

## 2023-08-02 PROCEDURE — 93000 ELECTROCARDIOGRAM COMPLETE: CPT | Performed by: NURSE PRACTITIONER

## 2023-08-02 PROCEDURE — 73522 X-RAY EXAM HIPS BI 3-4 VIEWS: CPT | Mod: TC | Performed by: RADIOLOGY

## 2023-08-02 PROCEDURE — 83550 IRON BINDING TEST: CPT | Performed by: NURSE PRACTITIONER

## 2023-08-02 PROCEDURE — 86803 HEPATITIS C AB TEST: CPT | Performed by: NURSE PRACTITIONER

## 2023-08-02 PROCEDURE — 99397 PER PM REEVAL EST PAT 65+ YR: CPT | Performed by: NURSE PRACTITIONER

## 2023-08-02 PROCEDURE — 36415 COLL VENOUS BLD VENIPUNCTURE: CPT | Performed by: NURSE PRACTITIONER

## 2023-08-02 PROCEDURE — 84550 ASSAY OF BLOOD/URIC ACID: CPT | Performed by: NURSE PRACTITIONER

## 2023-08-02 PROCEDURE — 82728 ASSAY OF FERRITIN: CPT | Performed by: NURSE PRACTITIONER

## 2023-08-02 PROCEDURE — 80061 LIPID PANEL: CPT | Performed by: NURSE PRACTITIONER

## 2023-08-02 PROCEDURE — 71046 X-RAY EXAM CHEST 2 VIEWS: CPT | Mod: TC | Performed by: RADIOLOGY

## 2023-08-02 PROCEDURE — 83540 ASSAY OF IRON: CPT | Performed by: NURSE PRACTITIONER

## 2023-08-02 PROCEDURE — 85025 COMPLETE CBC W/AUTO DIFF WBC: CPT | Performed by: NURSE PRACTITIONER

## 2023-08-02 PROCEDURE — 83735 ASSAY OF MAGNESIUM: CPT | Performed by: NURSE PRACTITIONER

## 2023-08-02 PROCEDURE — 80053 COMPREHEN METABOLIC PANEL: CPT | Performed by: NURSE PRACTITIONER

## 2023-08-02 PROCEDURE — G0103 PSA SCREENING: HCPCS | Performed by: NURSE PRACTITIONER

## 2023-08-02 PROCEDURE — 87389 HIV-1 AG W/HIV-1&-2 AB AG IA: CPT | Performed by: NURSE PRACTITIONER

## 2023-08-02 PROCEDURE — 99215 OFFICE O/P EST HI 40 MIN: CPT | Mod: 25 | Performed by: NURSE PRACTITIONER

## 2023-08-02 RX ORDER — TRAZODONE HYDROCHLORIDE 50 MG/1
50-100 TABLET, FILM COATED ORAL AT BEDTIME
Qty: 180 TABLET | Refills: 0 | Status: SHIPPED | OUTPATIENT
Start: 2023-08-02 | End: 2023-10-27

## 2023-08-02 RX ORDER — METHOCARBAMOL 500 MG/1
TABLET, FILM COATED ORAL
Qty: 60 TABLET | Refills: 3 | Status: SHIPPED | OUTPATIENT
Start: 2023-08-02

## 2023-08-02 RX ORDER — LOSARTAN POTASSIUM 100 MG/1
100 TABLET ORAL DAILY
Qty: 90 TABLET | Refills: 1 | Status: SHIPPED | OUTPATIENT
Start: 2023-08-02

## 2023-08-02 RX ORDER — TAMSULOSIN HYDROCHLORIDE 0.4 MG/1
0.8 CAPSULE ORAL DAILY
Qty: 180 CAPSULE | Refills: 0 | Status: SHIPPED | OUTPATIENT
Start: 2023-08-02

## 2023-08-02 RX ORDER — CARVEDILOL 6.25 MG/1
6.25 TABLET ORAL 2 TIMES DAILY
Qty: 180 TABLET | Refills: 1 | Status: SHIPPED | OUTPATIENT
Start: 2023-08-02

## 2023-08-02 RX ORDER — OMEPRAZOLE 40 MG/1
40 CAPSULE, DELAYED RELEASE ORAL 2 TIMES DAILY
Qty: 60 CAPSULE | Refills: 0 | Status: SHIPPED | OUTPATIENT
Start: 2023-08-02 | End: 2023-10-25

## 2023-08-02 ASSESSMENT — PATIENT HEALTH QUESTIONNAIRE - PHQ9
10. IF YOU CHECKED OFF ANY PROBLEMS, HOW DIFFICULT HAVE THESE PROBLEMS MADE IT FOR YOU TO DO YOUR WORK, TAKE CARE OF THINGS AT HOME, OR GET ALONG WITH OTHER PEOPLE: SOMEWHAT DIFFICULT
SUM OF ALL RESPONSES TO PHQ QUESTIONS 1-9: 9
SUM OF ALL RESPONSES TO PHQ QUESTIONS 1-9: 9

## 2023-08-02 ASSESSMENT — PAIN SCALES - GENERAL: PAINLEVEL: MILD PAIN (3)

## 2023-08-02 NOTE — PROGRESS NOTES
Answers submitted by the patient for this visit:  Patient Health Questionnaire (Submitted on 8/2/2023)  If you checked off any problems, how difficult have these problems made it for you to do your work, take care of things at home, or get along with other people?: Somewhat difficult  PHQ9 TOTAL SCORE: 9    Assessment & Plan   Medicare subsequent visit  Persistent insomnia  Chronic and ongoing.  Continue  - traZODone (DESYREL) 50 MG tablet  Dispense: 180 tablet; Refill: 0    Benign prostatic hyperplasia with nocturia  Symptoms improved.  Due for follow-up with urology.  Due for repeat PSA  Continue tamsulosin  - tamsulosin (FLOMAX) 0.4 MG capsule  Dispense: 180 capsule; Refill: 0    - PSA, screen  - CBC with platelets and differential    Gastroesophageal reflux disease without esophagitis  Symptomatic care strategies reviewed.  Ongoing symptoms with mild improvement.  Continue omeprazole.  Yearly EGD recommended  - omeprazole (PRILOSEC) 40 MG DR capsule  Dispense: 60 capsule; Refill: 0    Acute pain of right shoulder  Symptomatic care strategies reviewed.  Rest.  Ice.  Compress, elevate, may use methocarbamol for spasm and  - methocarbamol (ROBAXIN) 500 MG tablet  Dispense: 60 tablet; Refill: 3    Hypertension goal BP (blood pressure) < 140/90  Blood pressure under good control today.  Some recent anterior chest pressure pain.  Denies any history of coronary artery disease.  Tolerating medications without difficulty.  May be having a slight cough from the losartan.  Interested in pursuing cardiac stress testing due to chest pain  - losartan (COZAAR) 100 MG tablet  Dispense: 90 tablet; Refill: 1  - carvedilol (COREG) 6.25 MG tablet  Dispense: 180 tablet; Refill: 1  - NM Lexiscan stress test  - CBC with platelets and differential  - Comprehensive metabolic panel (BMP + Alb, Alk Phos, ALT, AST, Total. Bili, TP)  - Lipid panel reflex to direct LDL Fasting    Screening for HIV (human immunodeficiency virus)  Screening  done  - HIV Antigen Antibody Combo    Need for hepatitis C screening test  Screening completed  - Hepatitis C Screen Reflex to HCV RNA Quant and Genotype      Bilateral hip pain    - XR Pelvis and Hip Bilateral 2 Views    Right ankle swelling    - Uric acid    Elevated prostate specific antigen (PSA)    - PSA, screen    Leg cramping    - Magnesium    Iron deficiency anemia due to chronic blood loss  - Iron and iron binding capacity  - Ferritin    Seasonal allergic rhinitis, unspecified trigger  Symptomatic care strategies reviewed  - Adult Allergy/Asthma Referral    Acute sinusitis with symptoms > 10 days  Nasal turbinates yellowish-green drainage pain with palpation of the frontal bilaterally begin oral antibiotics  - amoxicillin-clavulanate (AUGMENTIN) 875-125 MG tablet  Dispense: 20 tablet; Refill: 0    Chest pain, unspecified type  - EKG 12-lead complete w/read - Clinics  - XR Chest 2 Views  - NM Lexiscan stress test      Call or return to the clinic with any worsening of symptoms or no resolution. Patient/Parent verbalized understanding and is in agreement. Medication side effects reviewed.   Current Outpatient Medications   Medication Sig Dispense Refill    amoxicillin-clavulanate (AUGMENTIN) 875-125 MG tablet Take 1 tablet by mouth 2 times daily for 10 days 20 tablet 0    carvedilol (COREG) 6.25 MG tablet Take 1 tablet (6.25 mg) by mouth 2 times daily 180 tablet 1    diclofenac (VOLTAREN) 1 % topical gel Apply 4 g topically 4 times daily 100 g 5    losartan (COZAAR) 100 MG tablet Take 1 tablet (100 mg) by mouth daily 90 tablet 1    methocarbamol (ROBAXIN) 500 MG tablet TAKE 1 TABLET BY MOUTH 3 TIMES DAILY AS NEEDED FOR MUSCLE SPASMS 60 tablet 3    omeprazole (PRILOSEC) 40 MG DR capsule Take 1 capsule (40 mg) by mouth 2 times daily 60 capsule 0    tamsulosin (FLOMAX) 0.4 MG capsule Take 2 capsules (0.8 mg) by mouth daily 180 capsule 0    traZODone (DESYREL) 50 MG tablet Take 1-2 tablets ( mg) by mouth At  "Bedtime 180 tablet 0     Chart documentation with Dragon Voice recognition Software. Although reviewed after completion, some words and grammatical errors may remain.  40 minutes spent on chart review,education, exam and documentation    DES Mejia CNP [facility]    Denise Ugarte is a 67 year old, presenting for the following health issues:  Hypertension and Physical        8/2/2023     5:20 PM   Additional Questions   Roomed by Serena       History of Present Illness       Back Pain:  He presents for follow up of back pain. Patient's back pain is a chronic problem.  Location of back pain:  Right lower back, left lower back, right side of neck, left side of neck, right shoulder, left shoulder, right hip and left hip  Description of back pain: burning, dull ache and sharp  Back pain spreads: nowhere    Since patient first noticed back pain, pain is: always present, but gets better and worse  Does back pain interfere with his job:  No       Hypertension: He presents for follow up of hypertension.  He does check blood pressure  regularly outside of the clinic. Outside blood pressures have been over 140/90. He follows a low salt diet.     Headaches:   Since the patient's last clinic visit, headaches are: worsened  The patient is getting headaches:  Lately  He is not able to do normal daily activities when he has a migraine.  The patient is taking the following rescue/relief medications:  Ibuprofen (Advil, Motrin), Naproxyn (Aleve), Tylenol and Excedrin   Patient states \"The relief is inconsistent\" from the rescue/relief medications.   The patient is taking the following medications to prevent migraines:  No medications to prevent migraines  In the past 4 weeks, the patient has gone to an Urgent Care or Emergency Room 0 times times due to headaches.    Reason for visit:  Wellness check    He eats 2-3 servings of fruits and vegetables daily.He consumes 2 sweetened beverage(s) " "daily.He exercises with enough effort to increase his heart rate 10 to 19 minutes per day.  He exercises with enough effort to increase his heart rate 3 or less days per week.   He is taking medications regularly.     Right ankle pain for a few months   Swelling at times   Pain 3/10     Sinus pain and pressure   1-2 weeks   Headaches   Nausea     Bilateral hand pain   Ongoing for months     Med follow ups and refills   Anterior chest pain 2 times a week lasting for several minutes      Annual Wellness Visit    Patient has been advised of split billing requirements and indicates understanding: Yes     Are you in the first 12 months of your Medicare Part B coverage?  No    Physical Health:  In general, how would you rate your overall physical health? fair  Outside of work, how many days during the week do you exercise?none  Outside of work, approximately how many minutes a day do you exercise?not applicable  If you drink alcohol do you typically have >3 drinks per day or >7 drinks per week? No  Do you usually eat at least 4 servings of fruit and vegetables a day, include whole grains & fiber and avoid regularly eating high fat or \"junk\" foods? Yes  Do you have any problems taking medications regularly? No  Do you have any side effects from medications? not applicable  Needs assistance for the following daily activities: no assistance needed  Which of the following safety concerns are present in your home?  none identified   Hearing impairment: No  In the past 6 months, have you been bothered by leaking of urine? no    Mental Health:  In general, how would you rate your overall mental or emotional health? fair  PHQ-2 Score:      Do you feel safe in your environment? Yes    Have you ever done Advance Care Planning? (For example, a Health Directive, POLST, or a discussion with a medical provider or your loved ones about your wishes)? No, advance care planning information given to patient to review.  Patient plans to " "discuss their wishes with loved ones or provider.      Fall risk:  Fallen 2 or more times in the past year?: Yes  Any fall with injury in the past year?: No    Cognitive Screenin) Repeat 3 items (Leader, Season, Table)    2) Clock draw: NORMAL  3) 3 item recall: Recalls 3 objects  Results: 3 items recalled: COGNITIVE IMPAIRMENT LESS LIKELY    Mini-CogTM Copyright CARROL Flores. Licensed by the author for use in [facility]; reprinted with permission (yung@[facility]). All rights reserved.          Social History     Tobacco Use    Smoking status: Never    Smokeless tobacco: Never    Tobacco comments:     no smoking in the home   Substance Use Topics    Alcohol use: Yes     Comment: rare             3/4/2021     4:31 PM   Alcohol Use   Prescreen: >3 drinks/day or >7 drinks/week? Not Applicable     Do you have a current opioid prescription?   Do you use any other controlled substances or medications that are not prescribed by a provider?     Current providers sharing in care for this patient include:   Patient Care Team:  Lenore Cornejo APRN CNP as PCP - General (Family Practice)  Lenore Cornejo APRN CNP as Assigned PCP    Patient has been advised of split billing requirements and indicates understanding:   I have reviewed Opioid Use Disorder and Substance Use Disorder risk factors and made any needed referrals.   No concerns      Review of Systems   Constitutional, HEENT, cardiovascular, pulmonary, GI, , musculoskeletal, neuro, skin, endocrine and psych systems are negative, except as otherwise noted.      Objective    /70   Pulse 59   Temp 97.3  F (36.3  C) (Tympanic)   Resp 14   Ht 1.702 m (5' 7\")   Wt 81.2 kg (179 lb)   SpO2 100%   BMI 28.04 kg/m    Body mass index is 28.04 kg/m .  Physical Exam   GENERAL: healthy, alert and no distress  EYES: Eyes grossly normal to inspection, PERRL and conjunctivae and sclerae normal  HENT: ear canals and TM's normal, nose and mouth " without ulcers or lesions  NECK: no adenopathy, no asymmetry, masses, or scars and thyroid normal to palpation  RESP: lungs clear to auscultation - no rales, rhonchi or wheezes  CV: regular rate and rhythm, normal S1 S2, no S3 or S4, no murmur, click or rub, no peripheral edema and peripheral pulses strong  ABDOMEN: soft, nontender, no hepatosplenomegaly, no masses and bowel sounds normal  MS: no gross musculoskeletal defects noted, no edema  SKIN: no suspicious lesions or rashes  NEURO: Normal strength and tone, mentation intact and speech normal  PSYCH: mentation appears normal, affect normal/bright    Office Visit on 04/28/2022   Component Date Value Ref Range Status    Prostate Specific Antigen Screen 04/28/2022 7.46 (H)  0.00 - 4.00 ug/L Final    Sodium 04/28/2022 139  133 - 144 mmol/L Final    Potassium 04/28/2022 4.1  3.4 - 5.3 mmol/L Final    Chloride 04/28/2022 109  94 - 109 mmol/L Final    Carbon Dioxide (CO2) 04/28/2022 25  20 - 32 mmol/L Final    This result was previously suppressed from the chart.    Anion Gap 04/28/2022 5  3 - 14 mmol/L Final    This result was previously suppressed from the chart.    Urea Nitrogen 04/28/2022 27  7 - 30 mg/dL Final    This result was previously suppressed from the chart.    Creatinine 04/28/2022 1.05  0.66 - 1.25 mg/dL Final    This result was previously suppressed from the chart.    Calcium 04/28/2022 9.0  8.5 - 10.1 mg/dL Final    This result was previously suppressed from the chart.    Glucose 04/28/2022 84  70 - 99 mg/dL Final    This result was previously suppressed from the chart.    Alkaline Phosphatase 04/28/2022 86  40 - 150 U/L Final    This result was previously suppressed from the chart.    AST 04/28/2022 24  0 - 45 U/L Final    This result was previously suppressed from the chart.    ALT 04/28/2022 33  0 - 70 U/L Final    This result was previously suppressed from the chart.    Protein Total 04/28/2022 7.7  6.8 - 8.8 g/dL Final    This result was  previously suppressed from the chart.    Albumin 04/28/2022 3.3 (L)  3.4 - 5.0 g/dL Final    This result was previously suppressed from the chart.    Bilirubin Total 04/28/2022 0.4  0.2 - 1.3 mg/dL Final    This result was previously suppressed from the chart.    GFR Estimate 04/28/2022 79  >60 mL/min/1.73m2 Final    Comment: Effective December 21, 2021 eGFRcr in adults is calculated using the 2021 CKD-EPI creatinine equation which includes age and gender (Rohini et al., NE, DOI: 10.1056/VKBLhd6566991)                             This result was previously suppressed from the chart.    Cholesterol 04/28/2022 191  <200 mg/dL Final    Triglycerides 04/28/2022 97  <150 mg/dL Final    Direct Measure HDL 04/28/2022 53  >=40 mg/dL Final    LDL Cholesterol Calculated 04/28/2022 119 (H)  <=100 mg/dL Final    Non HDL Cholesterol 04/28/2022 138 (H)  <130 mg/dL Final    Patient Fasting > 8hrs? 04/28/2022 Unknown   Final    WBC Count 04/28/2022 8.3  4.0 - 11.0 10e3/uL Final    RBC Count 04/28/2022 3.85 (L)  4.40 - 5.90 10e6/uL Final    Hemoglobin 04/28/2022 11.9 (L)  13.3 - 17.7 g/dL Final    Hematocrit 04/28/2022 35.3 (L)  40.0 - 53.0 % Final    MCV 04/28/2022 92  78 - 100 fL Final    MCH 04/28/2022 30.9  26.5 - 33.0 pg Final    MCHC 04/28/2022 33.7  31.5 - 36.5 g/dL Final    RDW 04/28/2022 13.5  10.0 - 15.0 % Final    Platelet Count 04/28/2022 256  150 - 450 10e3/uL Final    % Neutrophils 04/28/2022 60  % Final    % Lymphocytes 04/28/2022 24  % Final    % Monocytes 04/28/2022 13  % Final    % Eosinophils 04/28/2022 3  % Final    % Basophils 04/28/2022 1  % Final    % Immature Granulocytes 04/28/2022 0  % Final    Absolute Neutrophils 04/28/2022 5.0  1.6 - 8.3 10e3/uL Final    Absolute Lymphocytes 04/28/2022 2.0  0.8 - 5.3 10e3/uL Final    Absolute Monocytes 04/28/2022 1.0  0.0 - 1.3 10e3/uL Final    Absolute Eosinophils 04/28/2022 0.2  0.0 - 0.7 10e3/uL Final    Absolute Basophils 04/28/2022 0.0  0.0 - 0.2 10e3/uL Final     Absolute Immature Granulocytes 04/28/2022 0.0  <=0.4 10e3/uL Final    Ferritin 04/28/2022 117  26 - 388 ng/mL Final    Iron 04/28/2022 36  35 - 180 ug/dL Final    Iron Binding Capacity 04/28/2022 442 (H)  240 - 430 ug/dL Final    Iron Sat Index 04/28/2022 8 (L)  15 - 46 % Final    Vitamin B12 04/28/2022 476  193 - 986 pg/mL Final

## 2023-08-03 ENCOUNTER — TELEPHONE (OUTPATIENT)
Dept: CARDIOLOGY | Facility: CLINIC | Age: 68
End: 2023-08-03

## 2023-08-03 DIAGNOSIS — M47.26 OSTEOARTHRITIS OF SPINE WITH RADICULOPATHY, LUMBAR REGION: Primary | ICD-10-CM

## 2023-08-03 DIAGNOSIS — M53.3 SACROILIAC JOINT PAIN: ICD-10-CM

## 2023-08-03 LAB
ALBUMIN SERPL BCG-MCNC: 4.2 G/DL (ref 3.5–5.2)
ALP SERPL-CCNC: 68 U/L (ref 40–129)
ALT SERPL W P-5'-P-CCNC: 16 U/L (ref 0–70)
ANION GAP SERPL CALCULATED.3IONS-SCNC: 13 MMOL/L (ref 7–15)
AST SERPL W P-5'-P-CCNC: 22 U/L (ref 0–45)
BILIRUB SERPL-MCNC: 0.2 MG/DL
BUN SERPL-MCNC: 27.1 MG/DL (ref 8–23)
CALCIUM SERPL-MCNC: 9.6 MG/DL (ref 8.8–10.2)
CHLORIDE SERPL-SCNC: 105 MMOL/L (ref 98–107)
CHOLEST SERPL-MCNC: 191 MG/DL
CREAT SERPL-MCNC: 1 MG/DL (ref 0.67–1.17)
DEPRECATED HCO3 PLAS-SCNC: 23 MMOL/L (ref 22–29)
FERRITIN SERPL-MCNC: 145 NG/ML (ref 31–409)
GFR SERPL CREATININE-BSD FRML MDRD: 84 ML/MIN/1.73M2
GLUCOSE SERPL-MCNC: 93 MG/DL (ref 70–99)
HCV AB SERPL QL IA: NONREACTIVE
HDLC SERPL-MCNC: 37 MG/DL
HIV 1+2 AB+HIV1 P24 AG SERPL QL IA: NONREACTIVE
IRON BINDING CAPACITY (ROCHE): 310 UG/DL (ref 240–430)
IRON SATN MFR SERPL: 21 % (ref 15–46)
IRON SERPL-MCNC: 64 UG/DL (ref 61–157)
LDLC SERPL CALC-MCNC: 132 MG/DL
MAGNESIUM SERPL-MCNC: 2.3 MG/DL (ref 1.7–2.3)
NONHDLC SERPL-MCNC: 154 MG/DL
POTASSIUM SERPL-SCNC: 4.6 MMOL/L (ref 3.4–5.3)
PROT SERPL-MCNC: 7.2 G/DL (ref 6.4–8.3)
PSA SERPL DL<=0.01 NG/ML-MCNC: 6.21 NG/ML (ref 0–4.5)
SODIUM SERPL-SCNC: 141 MMOL/L (ref 136–145)
TRIGL SERPL-MCNC: 110 MG/DL

## 2023-08-03 NOTE — TELEPHONE ENCOUNTER
[facility] Call Center    Phone Message    May a detailed message be left on voicemail: yes     Reason for Call: Other: Patient would like to cancel stress test appt for 8/11. He will call back in September to re-schedule.      Action Taken: Other: cardiology    Travel Screening: Not Applicable  Thank you!  [facility]

## 2023-10-24 DIAGNOSIS — K21.9 GASTROESOPHAGEAL REFLUX DISEASE WITHOUT ESOPHAGITIS: ICD-10-CM

## 2023-10-25 RX ORDER — OMEPRAZOLE 40 MG/1
40 CAPSULE, DELAYED RELEASE ORAL 2 TIMES DAILY
Qty: 60 CAPSULE | Refills: 0 | Status: SHIPPED | OUTPATIENT
Start: 2023-10-25 | End: 2023-11-20

## 2023-10-27 DIAGNOSIS — G47.00 PERSISTENT INSOMNIA: ICD-10-CM

## 2023-10-27 RX ORDER — TRAZODONE HYDROCHLORIDE 50 MG/1
50-100 TABLET, FILM COATED ORAL AT BEDTIME
Qty: 180 TABLET | Refills: 0 | Status: SHIPPED | OUTPATIENT
Start: 2023-10-27

## 2023-11-08 NOTE — PROGRESS NOTES
03/23/22 1500   Appointment Info   Signing clinician's name / credentials Calderon Graciavin PT [facility]   Visits Used 4   Objective Measure 1   Objective Measure PROM flex 125, ER0 35, ER70 40, [id number] 45   Therapeutic Procedure/Exercise   Therapeutic Procedures: strength, endurance, ROM, flexibillity minutes (23691) 30   Skilled Intervention improved ROM   Patient Response/Progress nice capsular improvement through rx.     Plan   Plan for next session progress.     Ortho Goal 1   Goal Identifier 1   Goal Description pt will be able to wash hair with bilat UEs   Target Date 03/04/22   Ortho Goal 2   Goal Identifier 2   Goal Description pt will be able to tuck shirt   Target Date 03/18/22   Ortho Goal 3   Goal Identifier 3   Goal Description pt will be able to RTW with lifting restriction of 10#   Target Date 04/29/22     The patient came 4 visits and did not return for RCR rehab.  DISCHARGE  Reason for Discharge: Patient has failed to schedule further appointments.    Equipment Issued:     Discharge Plan: Patient to continue home program.    Referring Provider:  Khoi Venegas

## 2023-11-18 DIAGNOSIS — K21.9 GASTROESOPHAGEAL REFLUX DISEASE WITHOUT ESOPHAGITIS: ICD-10-CM

## 2023-11-20 RX ORDER — OMEPRAZOLE 40 MG/1
40 CAPSULE, DELAYED RELEASE ORAL 2 TIMES DAILY
Qty: 180 CAPSULE | Refills: 1 | Status: SHIPPED | OUTPATIENT
Start: 2023-11-20 | End: 2024-06-24

## 2024-06-23 DIAGNOSIS — K21.9 GASTROESOPHAGEAL REFLUX DISEASE WITHOUT ESOPHAGITIS: ICD-10-CM

## 2024-06-24 RX ORDER — OMEPRAZOLE 40 MG/1
40 CAPSULE, DELAYED RELEASE ORAL 2 TIMES DAILY
Qty: 180 CAPSULE | Refills: 0 | Status: SHIPPED | OUTPATIENT
Start: 2024-06-24

## 2024-07-11 DIAGNOSIS — N40.1 BENIGN PROSTATIC HYPERPLASIA WITH NOCTURIA: ICD-10-CM

## 2024-07-11 DIAGNOSIS — R35.1 BENIGN PROSTATIC HYPERPLASIA WITH NOCTURIA: ICD-10-CM

## 2024-07-12 RX ORDER — TAMSULOSIN HYDROCHLORIDE 0.4 MG/1
0.8 CAPSULE ORAL DAILY
Qty: 180 CAPSULE | Refills: 0 | OUTPATIENT
Start: 2024-07-12

## 2024-07-17 ENCOUNTER — PATIENT OUTREACH (OUTPATIENT)
Dept: ONCOLOGY | Facility: CLINIC | Age: 69
End: 2024-07-17
Payer: COMMERCIAL

## 2024-07-17 ENCOUNTER — TRANSCRIBE ORDERS (OUTPATIENT)
Dept: OTHER | Age: 69
End: 2024-07-17

## 2024-07-17 DIAGNOSIS — C61 PROSTATE CANCER (H): Primary | ICD-10-CM

## 2024-07-17 NOTE — PROGRESS NOTES
"New Patient Radiation Oncology Nurse Navigator Note     Referral Date: 7/17/24    Referring provider:   Mayur Simon MD   [address] [place] [address]   Phone: [phone]   Fax: [phone]     Referring Clinic/Organization: [facility]     Referred to: Radiation Oncology    Requested provider (if applicable): First available - did not specify  [place] location requested    Evaluation for : Prostate cance      Clinical History (per Nurse review of records provided):    6/11/24 PSA (8.20) -- BOOKMARKED  6/11/24 Office Visit with Urology at [facility]:  \"68-year-old male who presents on follow-up with his wife, Malissa, in attendance with regards to prostate cancer discussion. The patient previously was noted to have a right-sided prostate nodule. A biopsy revealed [place] 3, 3 adenocarcinoma involving 50 to 100% of each length of the 6 cores from the right lobe On the left prostate lobe he is noted [place] 3, 3 adenocarcinoma involving 95% of 1 core. PSA in August 2023 was 6.21. Repeat PSA is pending today  \" -- BOOKMARKED      Clinical Assessment / Barriers to Care (Per Nurse):  Pt lives in [facility]     Records Location: [facility] Everywhere     Records Needed:   Records at [facility] for a prostate MRI and [place] for a prostate Biopsy      Additional testing needed prior to consult:   N/A    Referral updates and Plan:   Referral received, chart reviewed by nursing, records obtained, scheduling instructions updated and routed to NPS requesting consult with RadOnc within 1-2 weeks if possible. Will follow-up as needed.     Zelda Carrera, PRISCILLAN, RN, PHN, OCN  Hematology/Oncology Nurse Navigator  [facility]  [phone]                "

## 2024-07-19 ENCOUNTER — PRE VISIT (OUTPATIENT)
Dept: ONCOLOGY | Facility: CLINIC | Age: 69
End: 2024-07-19
Payer: COMMERCIAL

## 2024-07-19 NOTE — TELEPHONE ENCOUNTER
MEDICAL RECORDS REQUEST   Radiation Oncology  [address]  [place] [address]  Fax: [phone]          FUTURE VISIT INFORMATION                                                   Bryce Diaz, : 1955 scheduled for future visit at [facility]    RECORDS REQUESTED FOR VISIT                                                     Action 2024 9:55 AM    Action Taken - Called [facility] for image.  - Path report from [facility] is in the Media Tab.      Imaging Received  2024 10:03 AM    Action: Images from [facility] received and resolved to PACS.     PROSTATE     OFFICE NOTE from urologist [facility]- [facility] 24: Dr. Mayur Simon   OPERATIVE/BIOPSY REPORTS (include if prostate was removed) External: [facility] 24: Prostate Bx   PSA LABS (within 5 years) [facility]- [facility] Most recent 24   MEDICATION LIST [facility]    LABS     PATHOLOGY REPORTS Report in External: [facility] 24: [id number]   ANYTHING RELATED TO DIAGNOSIS [facility]- [facility] Most recent 24   IMAGING (NEED IMAGES & REPORT)     MRI (Img req from [facility]) 24: MR Prostate

## 2024-08-23 ENCOUNTER — OFFICE VISIT (OUTPATIENT)
Dept: RADIATION THERAPY | Facility: OUTPATIENT CENTER | Age: 69
End: 2024-08-23
Attending: UROLOGY
Payer: COMMERCIAL

## 2024-08-23 VITALS — BODY MASS INDEX: 31.07 KG/M2 | HEART RATE: 59 BPM | OXYGEN SATURATION: 96 % | WEIGHT: 198.4 LBS

## 2024-08-23 DIAGNOSIS — C61 PROSTATE CANCER (H): Primary | ICD-10-CM

## 2024-08-23 RX ORDER — GABAPENTIN 300 MG/1
1 CAPSULE ORAL AT BEDTIME
COMMUNITY
Start: 2024-07-31

## 2024-08-23 NOTE — PROGRESS NOTES
Department of Radiation Oncology  Radiation Therapy Center  [facility]  [address], [address]  [place] [address]  [phone]       Consultation Note    Name: Bryce Diaz MRN: [id number]   : 1955   Date of Service: 2024  Referring: Dr. Simon     Reason for consultation: Prostate adenocarcinoma, low risk, Og score 3+3=6, PSA equals 8.2, clinical T2a N0 M0.    History of Present Illness   Mr. Diaz is a 68 year old male with a diagnosis of prostate cancer.    PSA in 2023 demonstrated a value of 6.21.  Repeat PSA on 2024 demonstrated about 8.2.  MRI of the prostate obtained on 2024 demonstrated 33.4 g prostate.  There was noted PI-RADS 2 lesion.  On 2024 prostate biopsy demonstrated adenocarcinoma, Canadian score of 3+3 =6.  The patient was seen by Dr. Simon who discussed different options including active surveillance versus definitive management.  The patient presents to our clinic discuss the potential role of radiation therapy as a part of treatment strategy.    Patient is overall doing well.  No acute complaints.  Denies any prior radiation.  No pacemaker.  No history of CAD or diabetes.  No history of heart attack stroke.  No history of lung or bowel disease.    Past Medical History:   Past Medical History:   Diagnosis Date    Backache, unspecified     Hx of back pain    Depressive disorder, not elsewhere classified     committed for suicidal ideation and depression    Depressive disorder, not elsewhere classified 2001    E/P Attention Deficit Disorder - See Sum. From [facility]    Heart murmur     Hernia of unspecified site of abdominal cavity without mention of obstruction or gangrene 1998    Umbilical hernia repair with mesh plug    Hypertension        Past Surgical History:   Past Surgical History:   Procedure Laterality Date    ARTHROSCOPY SHOULDER, OPEN ROTATOR CUFF REPAIR, COMBINED Right 1/3/2022     Procedure: Shoulder arthroscopy mini open rotator cuff repair, right;  Surgeon: Khoi Venegas MD;  Location: [facility]    COLONOSCOPY N/A 9/23/2015    Procedure: COLONOSCOPY;  Surgeon: Juan Francisco Cornejo MD;  Location: [facility]    COLONOSCOPY N/A 3/29/2021    Procedure: COLONOSCOPY;  Surgeon: Janki Correa MD;  Location: [facility]    ESOPHAGOSCOPY, GASTROSCOPY, DUODENOSCOPY (EGD), COMBINED N/A 3/29/2021    Procedure: ESOPHAGOGASTRODUODENOSCOPY, WITH BIOPSY;  Surgeon: Janki Correa MD;  Location: [facility]    HC RECONSTRUCT TENDON GOLDEN EA, W LOCAL TISSUES  08/07/1998    A-1 pulley release, right fourth finger    [facility] UGI ENDOSCOPY DIAG W OR W/O BRUSH/WASH  2002    bx. pending  - use proton pump inhibitor indefinitely    HEMILAMINECTOMY, DISCECTOMY LUMBAR TWO LEVELS, COMBINED Right 10/21/2015    Procedure: COMBINED HEMILAMINECTOMY, DISCECTOMY LUMBAR TWO LEVELS;  Surgeon: Jesus Ornelas MD;  Location: [facility]    INJECT EPIDURAL LUMBAR  11/14/13,2/10/14    [facility]    INJECT EPIDURAL LUMBAR  5/13/14,8/5/14    [facility]    INJECT EPIDURAL LUMBAR  12/15/14    [facility]    [facility] APPENDECTOMY      5 years of age    [facility] NONSPECIFIC PROCEDURE  1983    neck fusion       Chemotherapy History:  None    Radiation History:  None    Pregnant: Not Applicable  Implanted Cardiac Devices: No    Medications:  Current Outpatient Medications   Medication Sig Dispense Refill    carvedilol (COREG) 6.25 MG tablet Take 1 tablet (6.25 mg) by mouth 2 times daily 180 tablet 1    diclofenac (VOLTAREN) 1 % topical gel Apply 4 g topically 4 times daily 100 g 5    losartan (COZAAR) 100 MG tablet Take 1 tablet (100 mg) by mouth daily 90 tablet 1    methocarbamol (ROBAXIN) 500 MG tablet TAKE 1 TABLET BY MOUTH 3 TIMES DAILY AS NEEDED FOR MUSCLE SPASMS 60 tablet 3    omeprazole (PRILOSEC) 40 MG DR capsule TAKE 1 CAPSULE BY MOUTH TWICE A  capsule 0    tamsulosin (FLOMAX) 0.4 MG capsule Take 2 capsules (0.8 mg) by mouth  daily 180 capsule 0    traZODone (DESYREL) 50 MG tablet Take 1-2 tablets ( mg) by mouth at bedtime 180 tablet 0     No current facility-administered medications for this visit.         Allergies:   No Known Allergies      Family History:  Family History   Problem Relation Age of Onset    Cancer Mother         liver but all over    Cancer Father         brain and mouth cancer    Cancer Sister         melanoma    Cancer Brother         Prostate    C.A.D. No family hx of        Review of Systems   A 10-point review of systems was performed. Pertinent findings are noted in the HPI.    Physical Exam   ECOG Status: 1    Vitals:  There were no vitals taken for this visit.    Gen: Alert, in NAD  Head: NC/AT  Eyes: PERRL, EOMI, sclera anicteric  Ears: No external auricular lesions  Nose/sinus: No rhinorrhea or epistaxis  Oral cavity/oropharynx: MMM, no visible oral cavity lesions, FOM and BOT are soft to palpation  Neck: Full ROM, supple, no palpable adenopathy  Pulm: No wheezing, stridor or respiratory distress  CV: Extremities are warm and well-perfused, no cyanosis, no pedal edema  Abdominal: Normal bowel sounds, soft, nontender, no masses  Musculoskeletal: Normal bulk and tone  Skin: Normal color and turgor  Neuro: A/Ox3, CN II-XII intact, normal gait    Imaging/Path/Labs   Imagin/5/24  MRI prostate    IMPRESSION:   1. No suspicious-appearing/targetable prostatic lesions.   2. Findings consistent with BPH       Path:   24        Labs:   PSA   Date Value Ref Range Status   2021 5.53 (H) 0 - 4 ug/L Final     Comment:     Assay Method:  Chemiluminescence using Siemens Vista analyzer   10/17/2008 1.73 0 - 4 ug/L Final     Prostate Specific Antigen Screen   Date Value Ref Range Status   2023 6.21 (H) 0.00 - 4.50 ng/mL Final   2022 7.46 (H) 0.00 - 4.00 ug/L Final       Assessment    Mr. Diaz is a 68 year old male with a diagnosis of prostate adenocarcinoma, low risk, Og score 3+3=6, PSA  equals 8.2, clinical T2a N0 M0.    Plan     1. The patient has low risk prostate cancer. Today, we discussed options including active surveillance vs. definitive treatment for his prostate cancer. Definitive treatment strategies included surgery vs. radiation therapy. Radiation therapy techniques discussed included EBRT with conventional vs. Hypofractionation.     2. We discussed that results of the ProtecT trial that compared AS vs. RP vs. EBRT. We discussed PCSM was low irrespective of treatment modality but that surgery and radiation were associated with lower incidences of disease progression and metastasis (BELTRAN Paredes 2016). We also discussed the results of the updated PIVOT trial (BELTRAN Wallace 2017) which compared prostatectomy vs. observation for early state prostate cancer. In the PIVOT trial,  surgery was associated with lower all-cause mortality than observation among men with intermediate-risk disease but not among those with low-risk disease.      3. As such, we feel that continued AS vs. definitive local treatment would both be reasonable options.      4. We discussed side effects of treatment including fatigue, urinary obstructive symptoms, loose stool, proctitis, cystitis, and erectile dysfunction.      5. After detail discussion, the patient has elected to pursue active surveillance at this time.  Will continue surveillance with urology team.    Jerod Coles MD  Department of Radiation Oncology  [facility]

## 2024-08-23 NOTE — NURSING NOTE
"REASON FOR APPOINTMENT   Type of Cancer: Prostate Cancer  Location: Og 3+3=6  Date of Symptom Onset: urination changes    TREATMENT TO-DATE FOR THIS CANCER  Surgery ? Pt has met with Urology, Dr. Simon. Patient reports his choice would be to avoid surgery.   Chemotherapy ? Not indicated   Other Treatments for this Cancer ? Discussion and education about option of radiation treatment    PERSONAL HISTORY OF CANCER   Previous Cancer ? no   Prior Radiation ? no   Prior Chemotherapy ? no   Prior Hormonal Therapy ? no     RECENT IMAGING STUDIES      REFERRALS NEEDED  [employer] Fiducial placement if patient moves ahead with radiation    VITALS  Pulse 59   Wt 90 kg (198 lb 6.4 oz)   SpO2 96%   BMI 31.07 kg/m      PACEMAKER/IMPLANTED CARDIAC DEVICE no    PAIN  Denies    PSYCHOSOCIAL  Marital Status:   Patient lives in [place] with [facility].  Number of children:   Working status: retired  Do you feel safe in your home? Yes    REVIEW OF SYSTEMS  Skin: negative  Eyes: glasses  Ears/Nose/Throat: negative  Respiratory: No shortness of breath, dyspnea on exertion, cough, or hemoptysis  Cardiovascular: negative  Gastrointestinal: soft/loose at times  Genitourinary: weak stream, nocturia every 4 hrs  Musculoskeletal: R ankle/heel surgery x 2 this winter, working to regain endurance and flexiblity  Neurologic: numbness/neuropathy in R foot/ankle  Psychiatric: negative  Hematologic/Lymphatic/Immunologic: fatigue  Endocrine: negative      Radiation Oncology Patient Teaching    Current Concern: patient and wife ready to learn. Patient states \" my brother had a prostatectomy and he had a lot of trouble. I'd like to avoid surgery. I am interested in radiation\"    Person involved with teaching: Patient and Wife  Patient asked Questions: Yes  Patient was cooperative: Yes  Patient was receptive (willing to accept information given): Yes    Education Assessment  Comprehension ability: Medium  Knowledge level: Medium  Factors " affecting teaching: None    Education Materials Given  Radiation Therapy and You  Radiation to the pelvis    Educational Topics Discussed  Side effects and Medications    Response To Teaching  More review necessary      Do you have an advanced directive or living will? No  Are you DNR/DNI? No

## 2024-10-12 ENCOUNTER — HEALTH MAINTENANCE LETTER (OUTPATIENT)
Age: 69
End: 2024-10-12

## 2025-05-29 ENCOUNTER — TELEPHONE (OUTPATIENT)
Dept: FAMILY MEDICINE | Facility: CLINIC | Age: 70
End: 2025-05-29
Payer: COMMERCIAL

## 2025-05-29 NOTE — TELEPHONE ENCOUNTER
Patient Quality Outreach    Patient is due for the following:   Hypertension -  BP check  Depression  -  PHQ-9 needed    Action(s) Taken:   Patient was assigned appropriate questionnaire to complete    Type of outreach:    Sent [place] message.    Questions for provider review:    None         Bailee Kahler  Chart routed to None.

## (undated) DEVICE — TUBING ARTHROSCOPY PUMP ARTHREX AR-6410

## (undated) DEVICE — DRAPE ARTHROSCOPY SHOULDER BEACHCHAIR 29369

## (undated) DEVICE — GLOVE PROTEXIS BLUE W/NEU-THERA 8.0  2D73EB80

## (undated) DEVICE — GOWN IMPERVIOUS SPECIALTY XLG/XLONG 32474

## (undated) DEVICE — SU FIBERWIRE 2 38"  AR-7200

## (undated) DEVICE — SU MONOCRYL 2-0 CT-1 36" UND Y945H

## (undated) DEVICE — PACK SHOULDER

## (undated) DEVICE — SOL WATER IRRIG 1000ML BOTTLE 07139-09

## (undated) DEVICE — ARTHROSCOPIC CANNULA TWIST-IN PURPLE 7MMX7CM AR-6570

## (undated) DEVICE — SU VICRYL 1 CT-1 36" UND J947H

## (undated) DEVICE — PREP CHLORAPREP 26ML TINTED ORANGE  260815

## (undated) DEVICE — NDL ARTHREX MULTIFIRE/FASTPASS SCORPION AR-13995N

## (undated) DEVICE — DRSG ADAPTIC 3X8" 6113

## (undated) DEVICE — SU ETHILON 4-0 FS-1 1629H

## (undated) DEVICE — DRAPE IOBAN LG .375X23.5" 6648EZ

## (undated) DEVICE — PAD FLOOR SURGISAFE

## (undated) DEVICE — SUTURE PASSER SU CAPTURE SYS S&N FIRSTPASS 22-4038

## (undated) DEVICE — GLOVE PROTEXIS W/NEU-THERA 8.0  2D73TE80

## (undated) DEVICE — SLING ARM MED 0814-0063

## (undated) DEVICE — BUR ARTHREX COOLCUT EXCALIBUR 4.0MMX13CM AR-8400EX

## (undated) DEVICE — TAPE MEDIPORE 4"X10YD 2964

## (undated) DEVICE — SU STRATAFIX MONOCRYL 3-0 SPIRAL PS-2 30CM SXMP1B106

## (undated) DEVICE — DRSG STERI STRIP 1X5" R1548

## (undated) RX ORDER — HYDRALAZINE HYDROCHLORIDE 20 MG/ML
INJECTION INTRAMUSCULAR; INTRAVENOUS
Status: DISPENSED
Start: 2022-01-03

## (undated) RX ORDER — PROPOFOL 10 MG/ML
INJECTION, EMULSION INTRAVENOUS
Status: DISPENSED
Start: 2021-03-29

## (undated) RX ORDER — FENTANYL CITRATE 50 UG/ML
INJECTION, SOLUTION INTRAMUSCULAR; INTRAVENOUS
Status: DISPENSED
Start: 2022-01-03

## (undated) RX ORDER — FENTANYL CITRATE-0.9 % NACL/PF 10 MCG/ML
PLASTIC BAG, INJECTION (ML) INTRAVENOUS
Status: DISPENSED
Start: 2022-01-03

## (undated) RX ORDER — ACETAMINOPHEN 325 MG/1
TABLET ORAL
Status: DISPENSED
Start: 2022-01-03

## (undated) RX ORDER — ROPIVACAINE HYDROCHLORIDE 7.5 MG/ML
INJECTION, SOLUTION EPIDURAL; PERINEURAL
Status: DISPENSED
Start: 2022-01-03

## (undated) RX ORDER — CEFAZOLIN SODIUM 2 G/100ML
INJECTION, SOLUTION INTRAVENOUS
Status: DISPENSED
Start: 2022-01-03

## (undated) RX ORDER — LIDOCAINE HYDROCHLORIDE 10 MG/ML
INJECTION, SOLUTION EPIDURAL; INFILTRATION; INTRACAUDAL; PERINEURAL
Status: DISPENSED
Start: 2021-03-29

## (undated) RX ORDER — EPHEDRINE SULFATE 50 MG/ML
INJECTION, SOLUTION INTRAMUSCULAR; INTRAVENOUS; SUBCUTANEOUS
Status: DISPENSED
Start: 2022-01-03

## (undated) RX ORDER — GABAPENTIN 300 MG/1
CAPSULE ORAL
Status: DISPENSED
Start: 2022-01-03

## (undated) RX ORDER — PROPOFOL 10 MG/ML
INJECTION, EMULSION INTRAVENOUS
Status: DISPENSED
Start: 2022-01-03

## (undated) RX ORDER — MAGNESIUM SULFATE HEPTAHYDRATE 40 MG/ML
INJECTION, SOLUTION INTRAVENOUS
Status: DISPENSED
Start: 2022-01-03